# Patient Record
Sex: FEMALE | Race: WHITE | NOT HISPANIC OR LATINO | Employment: FULL TIME | URBAN - METROPOLITAN AREA
[De-identification: names, ages, dates, MRNs, and addresses within clinical notes are randomized per-mention and may not be internally consistent; named-entity substitution may affect disease eponyms.]

---

## 2017-01-23 ENCOUNTER — OFFICE VISIT (OUTPATIENT)
Dept: INTERNAL MEDICINE | Facility: CLINIC | Age: 42
End: 2017-01-23
Payer: COMMERCIAL

## 2017-01-23 VITALS
OXYGEN SATURATION: 100 % | SYSTOLIC BLOOD PRESSURE: 110 MMHG | TEMPERATURE: 98.2 F | HEART RATE: 78 BPM | BODY MASS INDEX: 25.45 KG/M2 | DIASTOLIC BLOOD PRESSURE: 82 MMHG | WEIGHT: 129.6 LBS | HEIGHT: 60 IN | RESPIRATION RATE: 16 BRPM

## 2017-01-23 DIAGNOSIS — R73.01 ELEVATED FASTING GLUCOSE: ICD-10-CM

## 2017-01-23 DIAGNOSIS — Z00.00 HEALTH CARE MAINTENANCE: Primary | ICD-10-CM

## 2017-01-23 DIAGNOSIS — Z13.6 CARDIOVASCULAR SCREENING; LDL GOAL LESS THAN 160: ICD-10-CM

## 2017-01-23 DIAGNOSIS — F41.9 ANXIETY: ICD-10-CM

## 2017-01-23 LAB
ALT SERPL W P-5'-P-CCNC: 42 U/L (ref 0–50)
ANION GAP SERPL CALCULATED.3IONS-SCNC: 10 MMOL/L (ref 3–14)
BUN SERPL-MCNC: 10 MG/DL (ref 7–30)
CALCIUM SERPL-MCNC: 8.7 MG/DL (ref 8.5–10.1)
CHLORIDE SERPL-SCNC: 102 MMOL/L (ref 94–109)
CHOLEST SERPL-MCNC: 212 MG/DL
CO2 SERPL-SCNC: 26 MMOL/L (ref 20–32)
CREAT SERPL-MCNC: 0.78 MG/DL (ref 0.52–1.04)
ERYTHROCYTE [DISTWIDTH] IN BLOOD BY AUTOMATED COUNT: 12 % (ref 10–15)
GFR SERPL CREATININE-BSD FRML MDRD: 81 ML/MIN/1.7M2
GLUCOSE SERPL-MCNC: 89 MG/DL (ref 70–99)
HBA1C MFR BLD: 4.8 % (ref 4.3–6)
HCT VFR BLD AUTO: 40.5 % (ref 35–47)
HDLC SERPL-MCNC: 106 MG/DL
HGB BLD-MCNC: 13.6 G/DL (ref 11.7–15.7)
LDLC SERPL CALC-MCNC: 89 MG/DL
MCH RBC QN AUTO: 32.9 PG (ref 26.5–33)
MCHC RBC AUTO-ENTMCNC: 33.6 G/DL (ref 31.5–36.5)
MCV RBC AUTO: 98 FL (ref 78–100)
NONHDLC SERPL-MCNC: 106 MG/DL
PLATELET # BLD AUTO: 256 10E9/L (ref 150–450)
POTASSIUM SERPL-SCNC: 4 MMOL/L (ref 3.4–5.3)
RBC # BLD AUTO: 4.13 10E12/L (ref 3.8–5.2)
SODIUM SERPL-SCNC: 138 MMOL/L (ref 133–144)
TRIGL SERPL-MCNC: 84 MG/DL
TSH SERPL DL<=0.005 MIU/L-ACNC: 2.94 MU/L (ref 0.4–4)
WBC # BLD AUTO: 5.4 10E9/L (ref 4–11)

## 2017-01-23 PROCEDURE — 99396 PREV VISIT EST AGE 40-64: CPT | Performed by: NURSE PRACTITIONER

## 2017-01-23 PROCEDURE — 36415 COLL VENOUS BLD VENIPUNCTURE: CPT | Performed by: NURSE PRACTITIONER

## 2017-01-23 PROCEDURE — 84443 ASSAY THYROID STIM HORMONE: CPT | Performed by: NURSE PRACTITIONER

## 2017-01-23 PROCEDURE — 80061 LIPID PANEL: CPT | Performed by: NURSE PRACTITIONER

## 2017-01-23 PROCEDURE — 80048 BASIC METABOLIC PNL TOTAL CA: CPT | Performed by: NURSE PRACTITIONER

## 2017-01-23 PROCEDURE — 83036 HEMOGLOBIN GLYCOSYLATED A1C: CPT | Performed by: NURSE PRACTITIONER

## 2017-01-23 PROCEDURE — 84460 ALANINE AMINO (ALT) (SGPT): CPT | Performed by: NURSE PRACTITIONER

## 2017-01-23 PROCEDURE — 86780 TREPONEMA PALLIDUM: CPT | Performed by: NURSE PRACTITIONER

## 2017-01-23 PROCEDURE — 85027 COMPLETE CBC AUTOMATED: CPT | Performed by: NURSE PRACTITIONER

## 2017-01-23 NOTE — PATIENT INSTRUCTIONS
Preventive Health Recommendations  Female Ages 40 to 49    Yearly exam:     See your health care provider every year in order to  1. Review health changes.   2. Discuss preventive care.    3. Review your medicines if your doctor prescribed any.      Get a Pap test every three years (unless you have an abnormal result and your provider advises testing more often).      If you get Pap tests with HPV test, you only need to test every 5 years, unless you have an abnormal result. You do not need a Pap test if your uterus was removed (hysterectomy) and you have not had cancer.      You should be tested each year for STDs (sexually transmitted diseases), if you're at risk.       Ask your doctor if you should have a mammogram.      Have a colonoscopy (test for colon cancer) if someone in your family has had colon cancer or polyps before age 50.       Have a cholesterol test every 5 years.       Have a diabetes test (fasting glucose) after age 45. If you are at risk for diabetes, you should have this test every 3 years.    Shots: Get a flu shot each year. Get a tetanus shot every 10 years.     Nutrition:     Eat at least 5 servings of fruits and vegetables each day.    Eat whole-grain bread, whole-wheat pasta and brown rice instead of white grains and rice.    Talk to your provider about Calcium and Vitamin D.     Lifestyle    Exercise at least 150 minutes a week (an average of 30 minutes a day, 5 days a week). This will help you control your weight and prevent disease.    Limit alcohol to one drink per day.    No smoking.     Wear sunscreen to prevent skin cancer.    See your dentist every six months for an exam and cleaning.    Alysia Mast CNP

## 2017-01-23 NOTE — PROGRESS NOTES
"   SUBJECTIVE:     CC: Sloane Aguilar is an 41 year old woman who presents for preventive health visit.     Healthy Habits:    Do you get at least three servings of calcium containing foods daily (dairy, green leafy vegetables, etc.)? yes and no, taking calcium and/or vitamin D supplement: no    Amount of exercise or daily activities, outside of work: 2 day(s) per week    Problems taking medications regularly No    Medication side effects: No    Have you had an eye exam in the past two years? yes    Do you see a dentist twice per year? yes    Do you have sleep apnea, excessive snoring or daytime drowsiness?no        Recent STD testing negative except unknown \"abnormality\" of syphilis titer    Today's PHQ-2 Score:   PHQ-2 ( 1999 Pfizer) 11/18/2015 8/18/2015   Q1: Little interest or pleasure in doing things 0 0   Q2: Feeling down, depressed or hopeless 0 0   PHQ-2 Score 0 0       Abuse: Current or Past(Physical, Sexual or Emotional)- No  Do you feel safe in your environment - Yes    Social History   Substance Use Topics     Smoking status: Never Smoker      Smokeless tobacco: Never Used     Alcohol Use: Yes      Comment: Occ     The patient does not drink >3 drinks per day nor >7 drinks per week.    Recent Labs   Lab Test  11/18/15   1008  11/18/15   1007  10/18/13   0742   CHOL  Canceled, Test credited   Charge credited    223*  219*   HDL  Canceled, Test credited   Charge credited    99  127*   LDL   --   106*  88   TRIG   --   88  83   CHOLHDLRATIO   --    --   1.9   NHDL  Canceled, Test credited   Charge credited    124   --        Reviewed orders with patient.  Reviewed health maintenance and updated orders accordingly - Yes    Mammo Decision Support:  Mammogram not appropriate for this patient based on age.    Pertinent mammograms are reviewed under the imaging tab.  History of abnormal Pap smear: NO - age 30- 65 PAP every 3 years recommended  All Histories reviewed and updated in Epic.  Past Medical History "   Diagnosis Date     anxiety         ROS:  C: NEGATIVE for fever, chills, change in weight  E: NEGATIVE for vision changes or irritation  ENT: NEGATIVE for ear, mouth and throat problems  R: NEGATIVE for significant cough or SOB  CV: NEGATIVE for chest pain, palpitations or peripheral edema  GI: NEGATIVE for nausea, abdominal pain, heartburn, or change in bowel habits   female: no unusual vaginal symptoms  M: NEGATIVE for significant arthralgias or myalgia  N: NEGATIVE for weakness, dizziness or paresthesias  P: NEGATIVE for changes in mood or affect    BP Readings from Last 3 Encounters:   01/23/17 110/82   02/07/16 126/82   12/11/15 108/71    Wt Readings from Last 3 Encounters:   01/23/17 129 lb 9.6 oz (58.786 kg)   02/07/16 131 lb (59.421 kg)   12/11/15 131 lb (59.421 kg)                  Patient Active Problem List   Diagnosis     Anxiety     CARDIOVASCULAR SCREENING; LDL GOAL LESS THAN 160     Cocaine dependence in remission (H)     Elevated fasting glucose     Past Surgical History   Procedure Laterality Date     Leep tx, cervical  2000     LEEP TX Cervical     Tubal ligation  2007     ablation     Hernia repair, umbilical  2007     Hc tooth extraction w/forcep       C appendectomy         Social History   Substance Use Topics     Smoking status: Never Smoker      Smokeless tobacco: Never Used     Alcohol Use: 0.0 oz/week     0 Standard drinks or equivalent per week      Comment: 2 wine several x weekly     Family History   Problem Relation Age of Onset     Hypertension Mother      CANCER Mother      uterine dx 2012     HEART DISEASE Mother      HEART DISEASE Father      HEART DISEASE Brother 48     stint         Current Outpatient Prescriptions   Medication Sig Dispense Refill     aluminum chloride (DRYSOL) 20 % external solution Use daily as directed 35 mL prn     escitalopram (LEXAPRO) 20 MG tablet Take 1 tablet (20 mg) by mouth daily 90 tablet 1     valACYclovir (VALTREX) 500 MG tablet Take 1 tablet (500  "mg) by mouth daily 1 TABLET DAILY 90 tablet 3     clotrimazole-betamethasone (LOTRISONE) cream Apply topically 2 times daily 30 g 1     traZODone (DESYREL) 50 MG tablet Take 1-2 tablets ( mg) by mouth nightly as needed for sleep 180 tablet 1     OBJECTIVE:     /82 mmHg  Pulse 78  Temp(Src) 98.2  F (36.8  C) (Oral)  Resp 16  Ht 5' 0.25\" (1.53 m)  Wt 129 lb 9.6 oz (58.786 kg)  BMI 25.11 kg/m2  SpO2 100%  EXAM:  GENERAL: healthy, alert and no distress  NECK: no adenopathy, no asymmetry, masses, or scars and thyroid normal to palpation  RESP: lungs clear to auscultation - no rales, rhonchi or wheezes  CV: regular rate and rhythm, normal S1 S2, no S3 or S4, no murmur, click or rub, no peripheral edema and peripheral pulses strong  ABDOMEN: soft, nontender, no hepatosplenomegaly, no masses and bowel sounds normal  MS: no gross musculoskeletal defects noted, no edema  PSYCH: mentation appears normal, affect normal/bright    ASSESSMENT/PLAN:         ICD-10-CM    1. Health care maintenance Z00.00 CBC with platelets     Basic metabolic panel     TSH with free T4 reflex     Anti Treponema   2. CARDIOVASCULAR SCREENING; LDL GOAL LESS THAN 160 Z13.6 ALT     Lipid Profile   3. Elevated fasting glucose R73.01 Hemoglobin A1c   4. Anxiety F41.9        COUNSELING:   Reviewed preventive health counseling, as reflected in patient instructions         reports that she has never smoked. She has never used smokeless tobacco.    Estimated body mass index is 25.11 kg/(m^2) as calculated from the following:    Height as of this encounter: 5' 0.25\" (1.53 m).    Weight as of this encounter: 129 lb 9.6 oz (58.786 kg).       Counseling Resources:  ATP IV Guidelines  Pooled Cohorts Equation Calculator  Breast Cancer Risk Calculator  FRAX Risk Assessment  ICSI Preventive Guidelines  Dietary Guidelines for Americans, 2010  USDA's MyPlate  ASA Prophylaxis  Lung CA Screening    Alysia Mast NP  [facility]  "

## 2017-01-23 NOTE — MR AVS SNAPSHOT
After Visit Summary   1/23/2017    Sloane Aguilar    MRN: [id number]           Patient Information     Date Of Birth          1975        Visit Information        Provider Department      1/23/2017 9:00 AM Alysia Mast NP [facility]        Today's [facility] care maintenance    -  1     CARDIOVASCULAR SCREENING; LDL GOAL LESS THAN 160         Elevated fasting glucose         Anxiety           Care Instructions      Preventive Health Recommendations  Female Ages 40 to 49    Yearly exam:     See your health care provider every year in order to  1. Review health changes.   2. Discuss preventive care.    3. Review your medicines if your doctor prescribed any.      Get a Pap test every three years (unless you have an abnormal result and your provider advises testing more often).      If you get Pap tests with HPV test, you only need to test every 5 years, unless you have an abnormal result. You do not need a Pap test if your uterus was removed (hysterectomy) and you have not had cancer.      You should be tested each year for STDs (sexually transmitted diseases), if you're at risk.       Ask your doctor if you should have a mammogram.      Have a colonoscopy (test for colon cancer) if someone in your family has had colon cancer or polyps before age 50.       Have a cholesterol test every 5 years.       Have a diabetes test (fasting glucose) after age 45. If you are at risk for diabetes, you should have this test every 3 years.    Shots: Get a flu shot each year. Get a tetanus shot every 10 years.     Nutrition:     Eat at least 5 servings of fruits and vegetables each day.    Eat whole-grain bread, whole-wheat pasta and brown rice instead of white grains and rice.    Talk to your provider about Calcium and Vitamin D.     Lifestyle    Exercise at least 150 minutes a week (an average of 30 minutes a day, 5 days a week). This will help you control your weight and  "prevent disease.    Limit alcohol to one drink per day.    No smoking.     Wear sunscreen to prevent skin cancer.    See your dentist every six months for an exam and cleaning.    Alysia Mast CNP          Follow-ups after your visit        Who to contact     If you have questions or need follow up information about today's clinic visit or your schedule please contact [facility] directly at [phone].  Normal or non-critical lab and imaging results will be communicated to you by Owlrhart, letter or phone within 4 business days after the clinic has received the results. If you do not hear from us within 7 days, please contact the clinic through [employer] or phone. If you have a critical or abnormal lab result, we will notify you by phone as soon as possible.  Submit refill requests through PayOrPass or call your pharmacy and they will forward the refill request to us. Please allow 3 business days for your refill to be completed.          Additional Information About Your Visit        [email or web address] Information     PayOrPass gives you secure access to your electronic health record. If you see a primary care provider, you can also send messages to your care team and make appointments. If you have questions, please call your primary care clinic.  If you do not have a primary care provider, please call [phone] and they will assist you.        Care EveryWhere ID     This is your Care EveryWhere ID. This could be used by other organizations to access your [facility] medical records  [id number]        Your Vitals Were     Pulse Temperature Respirations Height BMI (Body Mass Index) Pulse Oximetry    78 98.2  F (36.8  C) (Oral) 16 5' 0.25\" (1.53 m) 25.11 kg/m2 100%       Blood Pressure from Last 3 Encounters:   01/23/17 110/82   02/07/16 126/82   12/11/15 108/71    Weight from Last 3 Encounters:   01/23/17 129 lb 9.6 oz (58.786 kg)   02/07/16 131 lb (59.421 kg)   12/11/15 131 lb (59.421 kg)              We " Performed the Following     ALT     Anti Treponema     Basic metabolic panel     CBC with platelets     Hemoglobin A1c     Lipid Profile     TSH with free T4 reflex        Primary Care Provider Office Phone # Fax #    Clevemarlene YAEL Stanford [phone]       [facility] [address] [facility] [address]        Thank you!     Thank you for choosing [facility]  for your care. Our goal is always to provide you with excellent care. Hearing back from our patients is one way we can continue to improve our services. Please take a few minutes to complete the written survey that you may receive in the mail after your visit with us. Thank you!             Your Updated Medication List - Protect others around you: Learn how to safely use, store and throw away your medicines at www.disposemymeds.org.          This list is accurate as of: 1/23/17  9:24 AM.  Always use your most recent med list.                   Brand Name Dispense Instructions for use    aluminum chloride 20 % external solution    DRYSOL    35 mL    Use daily as directed       clotrimazole-betamethasone cream    LOTRISONE    30 g    Apply topically 2 times daily       escitalopram 20 MG tablet    LEXAPRO    90 tablet    Take 1 tablet (20 mg) by mouth daily       traZODone 50 MG tablet    DESYREL    180 tablet    Take 1-2 tablets ( mg) by mouth nightly as needed for sleep       valACYclovir 500 MG tablet    VALTREX    90 tablet    Take 1 tablet (500 mg) by mouth daily 1 TABLET DAILY

## 2017-01-23 NOTE — NURSING NOTE
"Chief Complaint   Patient presents with     Physical     fasting,no pap needed.       Initial /82 mmHg  Pulse 78  Temp(Src) 98.2  F (36.8  C) (Oral)  Resp 16  Ht 5' 0.25\" (1.53 m)  Wt 129 lb 9.6 oz (58.786 kg)  BMI 25.11 kg/m2  SpO2 100% Estimated body mass index is 25.11 kg/(m^2) as calculated from the following:    Height as of this encounter: 5' 0.25\" (1.53 m).    Weight as of this encounter: 129 lb 9.6 oz (58.786 kg).  BP completed using cuff size: regular    "

## 2017-01-24 LAB — T PALLIDUM IGG+IGM SER QL: NEGATIVE

## 2017-03-23 DIAGNOSIS — B00.9 HSV INFECTION: ICD-10-CM

## 2017-03-23 DIAGNOSIS — F43.23 ADJUSTMENT DISORDER WITH MIXED ANXIETY AND DEPRESSED MOOD: ICD-10-CM

## 2017-03-23 NOTE — TELEPHONE ENCOUNTER
Pt has not been seen in Ob-Gyn since 8/2015. Attempted to contact pt to schedule an appt. Left voice message to call back.     *2 refill encounters from 3/23/17 for pt.

## 2017-03-23 NOTE — TELEPHONE ENCOUNTER
escitalopram     Last Written Prescription Date: 9/30/15  Last Fill Quantity: 90, # refills: 1  Last Office Visit with [facility] [place] primary care provider:  8/18/15        Last PHQ-9 score on record=   PHQ-9 SCORE 3/2/2010   Total Score 12

## 2017-03-23 NOTE — TELEPHONE ENCOUNTER
valacyclovir     Last Written Prescription Date: 9/23/15  Last Fill Quantity: 90, # refills: 3  Last Office Visit with [facility] or [facility] prescribing provider: 8/8/15        Creatinine   Date Value Ref Range Status   01/23/2017 0.78 0.52 - 1.04 mg/dL Final

## 2017-03-24 RX ORDER — ESCITALOPRAM OXALATE 20 MG/1
20 TABLET ORAL DAILY
Qty: 90 TABLET | Refills: 0 | Status: SHIPPED | OUTPATIENT
Start: 2017-03-24 | End: 2017-03-27

## 2017-03-24 RX ORDER — VALACYCLOVIR HYDROCHLORIDE 500 MG/1
500 TABLET, FILM COATED ORAL DAILY
Qty: 90 TABLET | Refills: 0 | Status: SHIPPED | OUTPATIENT
Start: 2017-03-24 | End: 2017-07-31

## 2017-03-24 NOTE — TELEPHONE ENCOUNTER
Alysia,  I saw this med was also requested.  Just wanted to send as well in case you are willing to also fill this one.  Shanae Llanes R.N.

## 2017-03-24 NOTE — TELEPHONE ENCOUNTER
Alysia,    You saw this pt for PE on 01/23/17.  She had gotten her escitalopram ordered by Dr Ovalles in the past.  But are you willing to tomy refills for this?  Shanae Llanes R.N.

## 2017-03-27 RX ORDER — ESCITALOPRAM OXALATE 20 MG/1
20 TABLET ORAL DAILY
Qty: 30 TABLET | Refills: 0 | Status: SHIPPED | OUTPATIENT
Start: 2017-03-27 | End: 2018-05-09

## 2017-04-15 ENCOUNTER — RADIANT APPOINTMENT (OUTPATIENT)
Dept: MAMMOGRAPHY | Facility: CLINIC | Age: 42
End: 2017-04-15
Payer: COMMERCIAL

## 2017-04-15 DIAGNOSIS — Z12.31 VISIT FOR SCREENING MAMMOGRAM: ICD-10-CM

## 2017-04-15 PROCEDURE — G0202 SCR MAMMO BI INCL CAD: HCPCS | Mod: TC

## 2017-07-31 DIAGNOSIS — B00.9 HSV INFECTION: ICD-10-CM

## 2017-08-01 RX ORDER — VALACYCLOVIR HYDROCHLORIDE 500 MG/1
TABLET, FILM COATED ORAL
Qty: 90 TABLET | Refills: 0 | Status: SHIPPED | OUTPATIENT
Start: 2017-08-01 | End: 2018-01-28

## 2017-08-01 NOTE — TELEPHONE ENCOUNTER
Valacyclovir     Last Written Prescription Date: 03/24/17  Last Fill Quantity: 90, # refills: 0  Last Office Visit with [facility] prescribing provider: 01/23/17 Mast        Creatinine   Date Value Ref Range Status   01/23/2017 0.78 0.52 - 1.04 mg/dL Final

## 2017-08-31 DIAGNOSIS — F43.23 ADJUSTMENT DISORDER WITH MIXED ANXIETY AND DEPRESSED MOOD: ICD-10-CM

## 2017-08-31 NOTE — TELEPHONE ENCOUNTER
Escitalopram     Last Written Prescription Date: 03/27/17  Last Fill Quantity: 30, # refills: 0  Last Office Visit with [facility] primary care provider:  01/23/17        Last PHQ-9 score on record=   PHQ-9 SCORE 3/2/2010   Total Score 12

## 2017-09-01 RX ORDER — ESCITALOPRAM OXALATE 20 MG/1
TABLET ORAL
Qty: 30 TABLET | Refills: 0 | OUTPATIENT
Start: 2017-09-01

## 2017-12-15 DIAGNOSIS — F43.23 ADJUSTMENT DISORDER WITH MIXED ANXIETY AND DEPRESSED MOOD: ICD-10-CM

## 2017-12-18 RX ORDER — ESCITALOPRAM OXALATE 20 MG/1
TABLET ORAL
Qty: 30 TABLET | Refills: 0 | OUTPATIENT
Start: 2017-12-18

## 2017-12-18 NOTE — TELEPHONE ENCOUNTER
Lexapro  Last Written Prescription Date:  3/27/17  Last Fill Quantity: 30,  # refills: 0   Last Office Visit with [facility] or [facility] prescribing provider:  1/23/17   Future Office Visit:     Routing refill request to provider for review/approval because:  A break in medication

## 2017-12-22 NOTE — TELEPHONE ENCOUNTER
Pt calls, asking why rx was refused. Discuss last rx was for 1 month supply in March so there's been a gap in administration. Offer appt to discuss. Okay with this, but wondering if appt closer to where she lives in [place] would be available. Transferred to scheduling to make appt.

## 2017-12-26 ENCOUNTER — OFFICE VISIT (OUTPATIENT)
Dept: FAMILY MEDICINE | Facility: CLINIC | Age: 42
End: 2017-12-26
Payer: COMMERCIAL

## 2017-12-26 VITALS
TEMPERATURE: 97.9 F | BODY MASS INDEX: 26.31 KG/M2 | SYSTOLIC BLOOD PRESSURE: 110 MMHG | WEIGHT: 134 LBS | DIASTOLIC BLOOD PRESSURE: 82 MMHG | RESPIRATION RATE: 16 BRPM | HEIGHT: 60 IN | HEART RATE: 72 BPM

## 2017-12-26 DIAGNOSIS — Z23 NEED FOR PROPHYLACTIC VACCINATION AND INOCULATION AGAINST INFLUENZA: ICD-10-CM

## 2017-12-26 DIAGNOSIS — F43.23 ADJUSTMENT DISORDER WITH MIXED ANXIETY AND DEPRESSED MOOD: Primary | ICD-10-CM

## 2017-12-26 PROCEDURE — 90471 IMMUNIZATION ADMIN: CPT | Performed by: NURSE PRACTITIONER

## 2017-12-26 PROCEDURE — 99213 OFFICE O/P EST LOW 20 MIN: CPT | Mod: 25 | Performed by: NURSE PRACTITIONER

## 2017-12-26 PROCEDURE — 90686 IIV4 VACC NO PRSV 0.5 ML IM: CPT | Performed by: NURSE PRACTITIONER

## 2017-12-26 RX ORDER — ESCITALOPRAM OXALATE 20 MG/1
20 TABLET ORAL DAILY
Qty: 90 TABLET | Refills: 1 | Status: SHIPPED | OUTPATIENT
Start: 2017-12-26 | End: 2018-05-09

## 2017-12-26 ASSESSMENT — ANXIETY QUESTIONNAIRES
6. BECOMING EASILY ANNOYED OR IRRITABLE: NOT AT ALL
7. FEELING AFRAID AS IF SOMETHING AWFUL MIGHT HAPPEN: NOT AT ALL
5. BEING SO RESTLESS THAT IT IS HARD TO SIT STILL: NOT AT ALL
2. NOT BEING ABLE TO STOP OR CONTROL WORRYING: NOT AT ALL
GAD7 TOTAL SCORE: 0
1. FEELING NERVOUS, ANXIOUS, OR ON EDGE: NOT AT ALL
3. WORRYING TOO MUCH ABOUT DIFFERENT THINGS: NOT AT ALL

## 2017-12-26 ASSESSMENT — PATIENT HEALTH QUESTIONNAIRE - PHQ9
SUM OF ALL RESPONSES TO PHQ QUESTIONS 1-9: 0
5. POOR APPETITE OR OVEREATING: NOT AT ALL

## 2017-12-26 ASSESSMENT — ENCOUNTER SYMPTOMS: NERVOUS/ANXIOUS: 1

## 2017-12-26 NOTE — MR AVS SNAPSHOT
After Visit Summary   12/26/2017    Sloane Aguilar    MRN: [id number]           Patient Information     Date Of Birth          1975        Visit Information        Provider Department      12/26/2017 5:20 PM Jemma Calixto APRN CNP [facility]        Today's Diagnoses     Need for prophylactic vaccination and inoculation against influenza    -  1    Adjustment disorder with mixed anxiety and depressed mood           Follow-ups after your visit        Follow-up notes from your care team     Return in about 6 months (around 6/26/2018) for mood .      Who to contact     If you have questions or need follow up information about today's clinic visit or your schedule please contact [facility] directly at [phone].  Normal or non-critical lab and imaging results will be communicated to you by [employer] - 3Phart, letter or phone within 4 business days after the clinic has received the results. If you do not hear from us within 7 days, please contact the clinic through [employer] Pauer [employer] 3Phart or phone. If you have a critical or abnormal lab result, we will notify you by phone as soon as possible.  Submit refill requests through [email or web address] or call your pharmacy and they will forward the refill request to us. Please allow 3 business days for your refill to be completed.          Additional Information About Your Visit        MyChart Information     [email or web address] gives you secure access to your electronic health record. If you see a primary care provider, you can also send messages to your care team and make appointments. If you have questions, please call your primary care clinic.  If you do not have a primary care provider, please call [phone] and they will assist you.        Care EveryWhere ID     This is your Care EveryWhere ID. This could be used by other organizations to access your [facility] medical records  [id number]        Your Vitals Were     Pulse Temperature Respirations Height  "BMI (Body Mass Index)       72 97.9  F (36.6  C) (Oral) 16 5' 0.25\" (1.53 m) 25.95 kg/m2        Blood Pressure from Last 3 Encounters:   12/26/17 110/82   01/23/17 110/82   02/07/16 126/82    Weight from Last 3 Encounters:   12/26/17 134 lb (60.8 kg)   01/23/17 129 lb 9.6 oz (58.8 kg)   02/07/16 131 lb (59.4 kg)              We Performed the Following     FLU VAC, SPLIT VIRUS IM > 3 YO (QUADRIVALENT) [15349]     Vaccine Administration, Initial [45160]          Today's Medication Changes          These changes are accurate as of: 12/26/17  6:00 PM.  If you have any questions, ask your nurse or doctor.               These medicines have changed or have updated prescriptions.        Dose/Directions    * escitalopram 20 MG tablet   Commonly known as:  LEXAPRO   This may have changed:  Another medication with the same name was added. Make sure you understand how and when to take each.   Used for:  Adjustment disorder with mixed anxiety and depressed mood   Changed by:  Hany Ovalles MD        Dose:  20 mg   Take 1 tablet (20 mg) by mouth daily   Quantity:  30 tablet   Refills:  0       * escitalopram 20 MG tablet   Commonly known as:  LEXAPRO   This may have changed:  You were already taking a medication with the same name, and this prescription was added. Make sure you understand how and when to take each.   Used for:  Adjustment disorder with mixed anxiety and depressed mood   Changed by:  Jemma Calixto APRN CNP        Dose:  20 mg   Take 1 tablet (20 mg) by mouth daily   Quantity:  90 tablet   Refills:  1       * Notice:  This list has 2 medication(s) that are the same as other medications prescribed for you. Read the directions carefully, and ask your doctor or other care provider to review them with you.         Where to get your medicines      These medications were [id number] [place] [facility] [address]  [address], [facility] [address]     Phone:  [phone]     " escitalopram 20 MG tablet                Primary Care Provider Office Phone # Fax #    Krishnakumari YAEL MD Abdoulaye [phone]       [address] [facility] [address]        Equal Access to Services     TABITHA AGARWAL : Hadii jason ku fero Sorobbieali, waaxda luqadaha, qaybta kaalmada aderodolfoyada, norah barrn jenna awad laRodokarissa lee. So [facility] [phone].    ATENCIÓN: Si habla español, tiene a pacheco disposición servicios gratuitos de asistencia lingüística. Llame al [phone].    We comply with applicable federal civil rights laws and [place] laws. We do not discriminate on the basis of race, color, national origin, age, disability, sex, sexual orientation, or gender identity.            Thank you!     Thank you for choosing [facility]  for your care. Our goal is always to provide you with excellent care. Hearing back from our patients is one way we can continue to improve our services. Please take a few minutes to complete the written survey that you may receive in the mail after your visit with us. Thank you!             Your Updated Medication List - Protect others around you: Learn how to safely use, store and throw away your medicines at www.disposemymeds.org.          This list is accurate as of: 12/26/17  6:00 PM.  Always use your most recent med list.                   Brand Name Dispense Instructions for use Diagnosis    aluminum chloride 20 % external solution    DRYSOL    35 mL    Use daily as directed    Perspiration excessive       clotrimazole-betamethasone cream    LOTRISONE    30 g    Apply topically 2 times daily    Contact dermatitis       * escitalopram 20 MG tablet    LEXAPRO    30 tablet    Take 1 tablet (20 mg) by mouth daily    Adjustment disorder with mixed anxiety and depressed mood       * escitalopram 20 MG tablet    LEXAPRO    90 tablet    Take 1 tablet (20 mg) by mouth daily    Adjustment disorder with mixed anxiety and depressed mood        traZODone 50 MG tablet    DESYREL    180 tablet    Take 1-2 tablets ( mg) by mouth nightly as needed for sleep    Insomnia       valACYclovir 500 MG tablet    VALTREX    90 tablet    TAKE 1 TABLET (500 MG) BY MOUTH DAILY    HSV infection       * Notice:  This list has 2 medication(s) that are the same as other medications prescribed for you. Read the directions carefully, and ask your doctor or other care provider to review them with you.

## 2017-12-26 NOTE — PROGRESS NOTES
SUBJECTIVE:                                                    Sloane Aguilar is a 42 year old female who presents to clinic today for the following health issues:      Depression     Depression & Anxiety Follow-up:     Depression/Anxiety:  Anxiety only    Status since last visit::  Stable    Other associated symptoms of anxiety::  None    Significant life event::  No    Current substance use::  None    Depression symptoms::  None  Anxiety     History of Present Illness     Depression & Anxiety Follow-up:     Depression/Anxiety:  Anxiety only    Status since last visit::  Stable    Other associated symptoms of anxiety::  None    Significant life event::  No    Current substance use::  None    Depression symptoms::  None    Taking Lexapro, for the most part, daily.  Will miss a dose here and there and will sometimes go off the medication in the summer.  She reports it is working well for her and denies side effects.  No current substance abuse or SI.  Occasional alcohol use.  PHQ-9 SCORE 8/3/2009 3/2/2010 12/26/2017   Total Score 7 12 -   Total Score - - 0     TAL-7 SCORE 4/12/2012 7/29/2014 12/26/2017   Total Score 13 0 -   Total Score - - 0         Problem list and histories reviewed & adjusted, as indicated.  Additional history: as documented      Current Outpatient Prescriptions   Medication Sig Dispense Refill     escitalopram (LEXAPRO) 20 MG tablet Take 1 tablet (20 mg) by mouth daily 90 tablet 1     valACYclovir (VALTREX) 500 MG tablet TAKE 1 TABLET (500 MG) BY MOUTH DAILY 90 tablet 0     escitalopram (LEXAPRO) 20 MG tablet Take 1 tablet (20 mg) by mouth daily 30 tablet 0     aluminum chloride (DRYSOL) 20 % external solution Use daily as directed 35 mL prn     clotrimazole-betamethasone (LOTRISONE) cream Apply topically 2 times daily 30 g 1     traZODone (DESYREL) 50 MG tablet Take 1-2 tablets ( mg) by mouth nightly as needed for sleep 180 tablet 1     Allergies   Allergen Reactions     Formaldehyde  "Rash       ROS:  Constitutional, HEENT, cardiovascular, pulmonary, gi and gu systems are negative, except as otherwise noted.      OBJECTIVE:   /82 (BP Location: Right arm, Patient Position: Sitting, Cuff Size: Adult Regular)  Pulse 72  Temp 97.9  F (36.6  C) (Oral)  Resp 16  Ht 5' 0.25\" (1.53 m)  Wt 134 lb (60.8 kg)  BMI 25.95 kg/m2  Body mass index is 25.95 kg/(m^2).  GENERAL: healthy, alert and no distress  NECK: no adenopathy, no asymmetry, masses, or scars and thyroid normal to palpation  RESP: lungs clear to auscultation - no rales, rhonchi or wheezes  CV: regular rate and rhythm, normal S1 S2, no S3 or S4, no murmur, click or rub, no peripheral edema and peripheral pulses strong  NEURO: AOx4  PSYCH: mentation appears normal, affect normal/bright    Diagnostic Test Results:  none     ASSESSMENT/PLAN:   1. Need for prophylactic vaccination and inoculation against influenza  Administered today.   - FLU VAC, SPLIT VIRUS IM > 3 YO (QUADRIVALENT) [99333]  - Vaccine Administration, Initial [15139]    2. anxiety  Updated GAD7.  Well controlled on lexapro.  Refilled; f/u in 6 mos.    - escitalopram (LEXAPRO) 20 MG tablet; Take 1 tablet (20 mg) by mouth daily  Dispense: 90 tablet; Refill: 1        JONAS Patel [facility]  Injectable Influenza Immunization Documentation    1.  Is the person to be vaccinated sick today?   No    2. Does the person to be vaccinated have an allergy to a component   of the vaccine?   No  Egg Allergy Algorithm Link    3. Has the person to be vaccinated ever had a serious reaction   to influenza vaccine in the past?   No    4. Has the person to be vaccinated ever had Guillain-Barré syndrome?   No    Form completed by Sloane"

## 2017-12-27 ASSESSMENT — ANXIETY QUESTIONNAIRES: GAD7 TOTAL SCORE: 0

## 2018-01-28 DIAGNOSIS — B00.9 HSV INFECTION: ICD-10-CM

## 2018-01-28 NOTE — LETTER
[facility]  [address], [address]  [place], [place]  [address]                                            TEL:[phone]  FAX:[phone]      Sloane Aguilar  [address]  [facility] [address]      February 2, 2018    Dear Sloane       We have received a refill request from your pharmacy, however, we were only able to provide a one time fill because you are due for an appointment and labs. Please call [phone] to schedule an appointment before you are due for your next refill.     Sincerely,  Mami, RN - Triage Nurse

## 2018-02-02 RX ORDER — VALACYCLOVIR HYDROCHLORIDE 500 MG/1
TABLET, FILM COATED ORAL
Qty: 30 TABLET | Refills: 0 | Status: SHIPPED | OUTPATIENT
Start: 2018-02-02 | End: 2018-04-05

## 2018-02-02 NOTE — TELEPHONE ENCOUNTER
"Requested Prescriptions   Pending Prescriptions Disp Refills     valACYclovir (VALTREX) 500 MG tablet [Pharmacy Med Name: VALACYCLOVIR  MG TABLET] 90 tablet 0     Sig: TAKE 1 TABLET (500 MG) BY MOUTH DAILY    Antivirals for Herpes Protocol Failed    1/28/2018 12:28 PM       Failed - Recent or future visit with authorizing provider's specialty    Patient had office visit in the last year or has a visit in the next 30 days with authorizing provider.  See \"Patient Info\" tab in inbasket, or \"Choose Columns\" in Meds & Orders section of the refill encounter.   Last OV: 01/23/17        Failed - Normal serum creatinine on file in past 12 months    Recent Labs   Lab Test  01/23/17   0929   CR  0.78          Passed - Patient is age 12 or older        Medication is being filled for 1 time refill only due to:  due for appt and labs  Mailed letter.      "

## 2018-03-09 ENCOUNTER — TRANSFERRED RECORDS (OUTPATIENT)
Dept: HEALTH INFORMATION MANAGEMENT | Facility: CLINIC | Age: 43
End: 2018-03-09

## 2018-04-05 DIAGNOSIS — B00.9 HSV INFECTION: ICD-10-CM

## 2018-04-05 RX ORDER — VALACYCLOVIR HYDROCHLORIDE 500 MG/1
500 TABLET, FILM COATED ORAL DAILY
Qty: 30 TABLET | Refills: 1 | Status: SHIPPED | OUTPATIENT
Start: 2018-04-05 | End: 2018-07-06

## 2018-04-05 NOTE — TELEPHONE ENCOUNTER
Patient calling, needs refill of valtrex, was told by pharmacy that she needed to contact office.  She is overdue for annual exam.  Medication is being filled for 1 time refill only due to:  Patient needs to be seen because it has been more than one year since last visit.   She will call to schedule annual exam.  Shawanda Castro RN

## 2018-05-04 ENCOUNTER — TELEPHONE (OUTPATIENT)
Dept: FAMILY MEDICINE | Facility: CLINIC | Age: 43
End: 2018-05-04

## 2018-05-04 NOTE — TELEPHONE ENCOUNTER
Panel Management Review      Patient has the following on her problem list: None      Composite cancer screening  Chart review shows that this patient is due/due soon for the following Pap Smear  Summary:    Patient is due/failing the following:   PAP    Action needed:   Patient needs office visit for pap.    Type of outreach:    None. Patient scheduled for upcoming appointment.     Questions for provider review:    None                                                                                                                                    Cecy Cordova MA     Chart routed to Care Team .

## 2018-05-09 ENCOUNTER — OFFICE VISIT (OUTPATIENT)
Dept: FAMILY MEDICINE | Facility: CLINIC | Age: 43
End: 2018-05-09
Payer: COMMERCIAL

## 2018-05-09 VITALS
DIASTOLIC BLOOD PRESSURE: 74 MMHG | HEART RATE: 80 BPM | RESPIRATION RATE: 16 BRPM | BODY MASS INDEX: 25.57 KG/M2 | TEMPERATURE: 98.3 F | SYSTOLIC BLOOD PRESSURE: 102 MMHG | WEIGHT: 132 LBS

## 2018-05-09 DIAGNOSIS — B96.89 BV (BACTERIAL VAGINOSIS): ICD-10-CM

## 2018-05-09 DIAGNOSIS — F43.23 ADJUSTMENT DISORDER WITH MIXED ANXIETY AND DEPRESSED MOOD: ICD-10-CM

## 2018-05-09 DIAGNOSIS — Z00.00 ROUTINE GENERAL MEDICAL EXAMINATION AT A HEALTH CARE FACILITY: Primary | ICD-10-CM

## 2018-05-09 DIAGNOSIS — Z11.3 SCREEN FOR STD (SEXUALLY TRANSMITTED DISEASE): ICD-10-CM

## 2018-05-09 DIAGNOSIS — N76.0 BV (BACTERIAL VAGINOSIS): ICD-10-CM

## 2018-05-09 DIAGNOSIS — Z12.4 SCREENING FOR CERVICAL CANCER: ICD-10-CM

## 2018-05-09 LAB
ANION GAP SERPL CALCULATED.3IONS-SCNC: 8 MMOL/L (ref 3–14)
BUN SERPL-MCNC: 6 MG/DL (ref 7–30)
CALCIUM SERPL-MCNC: 8.6 MG/DL (ref 8.5–10.1)
CHLORIDE SERPL-SCNC: 103 MMOL/L (ref 94–109)
CHOLEST SERPL-MCNC: 221 MG/DL
CO2 SERPL-SCNC: 27 MMOL/L (ref 20–32)
CREAT SERPL-MCNC: 0.72 MG/DL (ref 0.52–1.04)
GFR SERPL CREATININE-BSD FRML MDRD: 88 ML/MIN/1.7M2
GLUCOSE SERPL-MCNC: 81 MG/DL (ref 70–99)
HDLC SERPL-MCNC: 103 MG/DL
LDLC SERPL CALC-MCNC: 101 MG/DL
NONHDLC SERPL-MCNC: 118 MG/DL
POTASSIUM SERPL-SCNC: 4.2 MMOL/L (ref 3.4–5.3)
SODIUM SERPL-SCNC: 138 MMOL/L (ref 133–144)
SPECIMEN SOURCE: ABNORMAL
TRIGL SERPL-MCNC: 85 MG/DL
WET PREP SPEC: ABNORMAL

## 2018-05-09 PROCEDURE — 87389 HIV-1 AG W/HIV-1&-2 AB AG IA: CPT | Performed by: NURSE PRACTITIONER

## 2018-05-09 PROCEDURE — 80048 BASIC METABOLIC PNL TOTAL CA: CPT | Performed by: NURSE PRACTITIONER

## 2018-05-09 PROCEDURE — 86803 HEPATITIS C AB TEST: CPT | Performed by: NURSE PRACTITIONER

## 2018-05-09 PROCEDURE — G0124 SCREEN C/V THIN LAYER BY MD: HCPCS | Performed by: NURSE PRACTITIONER

## 2018-05-09 PROCEDURE — 87210 SMEAR WET MOUNT SALINE/INK: CPT | Performed by: NURSE PRACTITIONER

## 2018-05-09 PROCEDURE — 80061 LIPID PANEL: CPT | Performed by: NURSE PRACTITIONER

## 2018-05-09 PROCEDURE — 87624 HPV HI-RISK TYP POOLED RSLT: CPT | Performed by: NURSE PRACTITIONER

## 2018-05-09 PROCEDURE — 99396 PREV VISIT EST AGE 40-64: CPT | Performed by: NURSE PRACTITIONER

## 2018-05-09 PROCEDURE — 36415 COLL VENOUS BLD VENIPUNCTURE: CPT | Performed by: NURSE PRACTITIONER

## 2018-05-09 PROCEDURE — G0145 SCR C/V CYTO,THINLAYER,RESCR: HCPCS | Performed by: NURSE PRACTITIONER

## 2018-05-09 PROCEDURE — 87591 N.GONORRHOEAE DNA AMP PROB: CPT | Performed by: NURSE PRACTITIONER

## 2018-05-09 PROCEDURE — 87491 CHLMYD TRACH DNA AMP PROBE: CPT | Performed by: NURSE PRACTITIONER

## 2018-05-09 RX ORDER — METRONIDAZOLE 500 MG/1
500 TABLET ORAL 2 TIMES DAILY
Qty: 14 TABLET | Refills: 0 | Status: SHIPPED | OUTPATIENT
Start: 2018-05-09 | End: 2018-05-16

## 2018-05-09 RX ORDER — ESCITALOPRAM OXALATE 20 MG/1
20 TABLET ORAL DAILY
Qty: 90 TABLET | Refills: 1 | Status: SHIPPED | OUTPATIENT
Start: 2018-05-09 | End: 2018-05-09

## 2018-05-09 RX ORDER — ESCITALOPRAM OXALATE 20 MG/1
20 TABLET ORAL DAILY
Qty: 90 TABLET | Refills: 3 | Status: SHIPPED | OUTPATIENT
Start: 2018-05-09 | End: 2019-07-24

## 2018-05-09 ASSESSMENT — ANXIETY QUESTIONNAIRES
7. FEELING AFRAID AS IF SOMETHING AWFUL MIGHT HAPPEN: NOT AT ALL
GAD7 TOTAL SCORE: 0
6. BECOMING EASILY ANNOYED OR IRRITABLE: NOT AT ALL
1. FEELING NERVOUS, ANXIOUS, OR ON EDGE: NOT AT ALL
2. NOT BEING ABLE TO STOP OR CONTROL WORRYING: NOT AT ALL
5. BEING SO RESTLESS THAT IT IS HARD TO SIT STILL: NOT AT ALL
3. WORRYING TOO MUCH ABOUT DIFFERENT THINGS: NOT AT ALL

## 2018-05-09 ASSESSMENT — PATIENT HEALTH QUESTIONNAIRE - PHQ9: 5. POOR APPETITE OR OVEREATING: NOT AT ALL

## 2018-05-09 NOTE — MR AVS SNAPSHOT
After Visit Summary   5/9/2018    Sloane Aguilar    MRN: [id number]           Patient Information     Date Of Birth          1975        Visit Information        Provider Department      5/9/2018 9:00 AM Jemma Calixto APRN [facility]        Today's Diagnoses     Routine general medical examination at a health care facility    -  1    Screening for cervical cancer        Screen for STD (sexually transmitted disease)        anxiety          Care Instructions      Preventive Health Recommendations  Female Ages 40 to 49    Yearly exam:     See your health care provider every year in order to  1. Review health changes.   2. Discuss preventive care.    3. Review your medicines if your doctor prescribed any.      Get a Pap test every three years (unless you have an abnormal result and your provider advises testing more often).      If you get Pap tests with HPV test, you only need to test every 5 years, unless you have an abnormal result. You do not need a Pap test if your uterus was removed (hysterectomy) and you have not had cancer.      You should be tested each year for STDs (sexually transmitted diseases), if you're at risk.       Ask your doctor if you should have a mammogram.      Have a colonoscopy (test for colon cancer) if someone in your family has had colon cancer or polyps before age 50.       Have a cholesterol test every 5 years.       Have a diabetes test (fasting glucose) after age 45. If you are at risk for diabetes, you should have this test every 3 years.    Shots: Get a flu shot each year. Get a tetanus shot every 10 years.     Nutrition:     Eat at least 5 servings of fruits and vegetables each day.    Eat whole-grain bread, whole-wheat pasta and brown rice instead of white grains and rice.    Talk to your provider about Calcium and Vitamin D.     Lifestyle    Exercise at least 150 minutes a week (an average of 30 minutes a day, 5 days a week). This  will help you control your weight and prevent disease.    Limit alcohol to one drink per day.    No smoking.     Wear sunscreen to prevent skin cancer.    See your dentist every six months for an exam and cleaning.          Follow-ups after your visit        Follow-up notes from your care team     Return in about 1 year (around 5/9/2019) for Physical Exam.      Who to contact     If you have questions or need follow up information about today's clinic visit or your schedule please contact [facility] directly at [phone].  Normal or non-critical lab and imaging results will be communicated to you by COLOURlovershart, letter or phone within 4 business days after the clinic has received the results. If you do not hear from us within 7 days, please contact the clinic through [employer]Te[employer] or phone. If you have a critical or abnormal lab result, we will notify you by phone as soon as possible.  Submit refill requests through [employer] or call your pharmacy and they will forward the refill request to us. Please allow 3 business days for your refill to be completed.          Additional Information About Your Visit        COLOURloversharSmallaa Information     [employer] gives you secure access to your electronic health record. If you see a primary care provider, you can also send messages to your care team and make appointments. If you have questions, please call your primary care clinic.  If you do not have a primary care provider, please call [phone] and they will assist you.        Care EveryWhere ID     This is your Care EveryWhere ID. This could be used by other organizations to access your [facility] medical records  [id number]        Your Vitals Were     Pulse Temperature Respirations BMI (Body Mass Index)          80 98.3  F (36.8  C) (Oral) 16 25.57 kg/m2         Blood Pressure from Last 3 Encounters:   05/09/18 102/74   12/26/17 110/82   01/23/17 110/82    Weight from Last 3 Encounters:   05/09/18 132 lb (59.9 kg)    12/26/17 134 lb (60.8 kg)   01/23/17 129 lb 9.6 oz (58.8 kg)              We Performed the Following     Basic metabolic panel     Chlamydia trachomatis PCR     Hepatitis C antibody     HIV Antigen Antibody Combo     HPV High Risk Types DNA Cervical     Lipid panel reflex to direct LDL Fasting     Neisseria gonorrhoeae PCR     Pap imaged thin layer screen with HPV - recommended age 30 - 65 years (select HPV order below)     Wet prep          Today's Medication Changes          These changes are accurate as of 5/9/18  9:58 AM.  If you have any questions, ask your nurse or doctor.               Start taking these medicines.        Dose/Directions    escitalopram 20 MG tablet   Commonly known as:  LEXAPRO   Used for:  Adjustment disorder with mixed anxiety and depressed mood   Started by:  Jemma Calixto APRN CNP        Dose:  20 mg   Take 1 tablet (20 mg) by mouth daily   Quantity:  90 tablet   Refills:  3            Where to get your medicines      These medications were sent to [facility] [address] [place], [place] [address] [address] [address] [facility] [address]  [address] [facility] [address]     Phone:  [phone]     escitalopram 20 MG tablet                Primary Care Provider Office Phone # Fax #    Krishnakumari YAEL Stanford [phone]       [address] [facility] [address]        Equal Access to Services     TABITHA AGARWAL : Hadii aad ku hadasho Soomaali, waaxda luqadaha, qaybta kaalmada adeegyada, waxaustin lee. So [facility] [phone].    ATENCIÓN: Si habla español, tiene a pacheco disposición servicios gratuitos de asistencia lingüística. Omari al [phone].    We comply with applicable federal civil rights laws and [place] laws. We do not discriminate on the basis of race, color, national origin, age, disability, sex, sexual orientation, or gender identity.            Thank you!     Thank you for choosing [facility]  [facility]  for your care. Our goal is always to provide you with excellent care. Hearing back from our patients is one way we can continue to improve our services. Please take a few minutes to complete the written survey that you may receive in the mail after your visit with us. Thank you!             Your Updated Medication List - Protect others around you: Learn how to safely use, store and throw away your medicines at www.disposemymeds.org.          This list is accurate as of 5/9/18  9:58 AM.  Always use your most recent med list.                   Brand Name Dispense Instructions for use Diagnosis    aluminum chloride 20 % external solution    DRYSOL    35 mL    Use daily as directed    Perspiration excessive       clotrimazole-betamethasone cream    LOTRISONE    30 g    Apply topically 2 times daily    Contact dermatitis       escitalopram 20 MG tablet    LEXAPRO    90 tablet    Take 1 tablet (20 mg) by mouth daily    Adjustment disorder with mixed anxiety and depressed mood       traZODone 50 MG tablet    DESYREL    180 tablet    Take 1-2 tablets ( mg) by mouth nightly as needed for sleep    Insomnia       valACYclovir 500 MG tablet    VALTREX    30 tablet    Take 1 tablet (500 mg) by mouth daily    HSV infection

## 2018-05-09 NOTE — PROGRESS NOTES
SUBJECTIVE:   CC: Sloane Aguilar is an 42 year old woman who presents for preventive health visit.     Physical   Annual:     Getting at least 3 servings of Calcium per day::  Yes    Bi-annual eye exam::  Yes    Dental care twice a year::  Yes    Sleep apnea or symptoms of sleep apnea::  None    Diet::  Regular (no restrictions)    Frequency of exercise::  1 day/week    Duration of exercise::  45-60 minutes    Taking medications regularly::  Yes    Medication side effects::  None    Additional concerns today::  No              Would like STD testing.   Fasting for labs.   Hx of uterine ablation.  Gets light spotting around when she should have a period.      Today's PHQ-2 Score:   PHQ-2 ( 1999 Pfizer) 5/9/2018   Q1: Little interest or pleasure in doing things 0   Q2: Feeling down, depressed or hopeless 0   PHQ-2 Score 0   Q1: Little interest or pleasure in doing things Not at all   Q2: Feeling down, depressed or hopeless Not at all   PHQ-2 Score 0       Abuse: Current or Past(Physical, Sexual or Emotional)- No  Do you feel safe in your environment - Yes    Social History   Substance Use Topics     Smoking status: Never Smoker     Smokeless tobacco: Never Used     Alcohol use 0.0 oz/week     0 Standard drinks or equivalent per week      Comment: 2 wine several x weekly     Alcohol Use 5/9/2018   If you drink alcohol do you typically have greater than 3 drinks per day OR greater than 7 drinks per week? No       Reviewed orders with patient.  Reviewed health maintenance and updated orders accordingly - Yes  BP Readings from Last 3 Encounters:   05/09/18 102/74   12/26/17 110/82   01/23/17 110/82    Wt Readings from Last 3 Encounters:   05/09/18 132 lb (59.9 kg)   12/26/17 134 lb (60.8 kg)   01/23/17 129 lb 9.6 oz (58.8 kg)                  Current Outpatient Prescriptions   Medication Sig Dispense Refill     aluminum chloride (DRYSOL) 20 % external solution Use daily as directed 35 mL prn      clotrimazole-betamethasone (LOTRISONE) cream Apply topically 2 times daily 30 g 1     escitalopram (LEXAPRO) 20 MG tablet Take 1 tablet (20 mg) by mouth daily 90 tablet 1     traZODone (DESYREL) 50 MG tablet Take 1-2 tablets ( mg) by mouth nightly as needed for sleep 180 tablet 1     valACYclovir (VALTREX) 500 MG tablet Take 1 tablet (500 mg) by mouth daily 30 tablet 1     [DISCONTINUED] escitalopram (LEXAPRO) 20 MG tablet Take 1 tablet (20 mg) by mouth daily 30 tablet 0     Allergies   Allergen Reactions     Formaldehyde Rash       Patient under age 50, mutual decision reflected in health maintenance.      Pertinent mammograms are reviewed under the imaging tab.  History of abnormal Pap smear: NO - age 30- 65 PAP every 3 years recommended    Reviewed and updated as needed this visit by clinical staff  Tobacco  Allergies  Problems  Med Hx  Soc Hx        Reviewed and updated as needed this visit by Provider        Past Medical History:   Diagnosis Date     anxiety       Past Surgical History:   Procedure Laterality Date     C APPENDECTOMY       HC TOOTH EXTRACTION W/FORCEP       HERNIA REPAIR, UMBILICAL  2007     LEEP TX, CERVICAL  2000    LEEP TX Cervical     TUBAL LIGATION  2007    ablation       Review of Systems  CONSTITUTIONAL: NEGATIVE for fever, chills, change in weight  INTEGUMENTARU/SKIN: NEGATIVE for worrisome rashes, moles or lesions  EYES: NEGATIVE for vision changes or irritation  ENT: NEGATIVE for ear, mouth and throat problems  RESP: NEGATIVE for significant cough or SOB  BREAST: NEGATIVE for masses, tenderness or discharge  CV: NEGATIVE for chest pain, palpitations or peripheral edema  GI: NEGATIVE for nausea, abdominal pain, heartburn, or change in bowel habits  : NEGATIVE for unusual urinary or vaginal symptoms. Light spotting.   MUSCULOSKELETAL: NEGATIVE for significant arthralgias or myalgia  NEURO: NEGATIVE for weakness, dizziness or paresthesias  PSYCHIATRIC: NEGATIVE for changes in  mood or affect     OBJECTIVE:   Wt 132 lb (59.9 kg)  BMI 25.57 kg/m2  Physical Exam  GENERAL: healthy, alert and no distress  EYES: Eyes grossly normal to inspection, PERRL and conjunctivae and sclerae normal  HENT: ear canals and TM's normal, nose and mouth without ulcers or lesions  NECK: no adenopathy, no asymmetry, masses, or scars and thyroid normal to palpation  RESP: lungs clear to auscultation - no rales, rhonchi or wheezes  BREAST: normal without masses, tenderness or nipple discharge and no palpable axillary masses or adenopathy  CV: regular rate and rhythm, normal S1 S2, no S3 or S4, no murmur, click or rub, no peripheral edema and peripheral pulses strong  ABDOMEN: soft, nontender, no hepatosplenomegaly, no masses and bowel sounds normal   (female): normal female external genitalia, normal urethral meatus, vaginal mucosa pink, moist, well rugated, and normal cervix/adnexa/uterus without masses or discharge  MS: no gross musculoskeletal defects noted, no edema  SKIN: no suspicious lesions or rashes  NEURO: Normal strength and tone, mentation intact and speech normal  PSYCH: mentation appears normal, affect normal/bright    ASSESSMENT/PLAN:   1. Routine general medical examination at a health care facility  Normal exam; fasting today for labs.   - Lipid panel reflex to direct LDL Fasting  - Basic metabolic panel    2. Screening for cervical cancer  - Pap imaged thin layer screen with HPV - recommended age 30 - 65 years (select HPV order below)  - HPV High Risk Types DNA Cervical    3. Screen for STD (sexually transmitted disease)  Requesting testing.   - Neisseria gonorrhoeae PCR  - Chlamydia trachomatis PCR  - HIV Antigen Antibody Combo  - Hepatitis C antibody  - Wet prep    4. anxiety  Well controlled.  Refilled.  TAL = 0  - escitalopram (LEXAPRO) 20 MG tablet; Take 1 tablet (20 mg) by mouth daily  Dispense: 90 tablet; Refill: 3    COUNSELING:  Reviewed preventive health counseling, as reflected in  "patient instructions       Regular exercise       Healthy diet/nutrition       Safe sex practices/STD prevention         reports that she has never smoked. She has never used smokeless tobacco.    Estimated body mass index is 25.57 kg/(m^2) as calculated from the following:    Height as of 12/26/17: 5' 0.25\" (1.53 m).    Weight as of this encounter: 132 lb (59.9 kg).       Counseling Resources:  ATP IV Guidelines  Pooled Cohorts Equation Calculator  Breast Cancer Risk Calculator  FRAX Risk Assessment  ICSI Preventive Guidelines  Dietary Guidelines for Americans, 2010  USDA's MyPlate  ASA Prophylaxis  Lung CA Screening    JONAS Patel [facility]  "

## 2018-05-10 LAB
C TRACH DNA SPEC QL NAA+PROBE: NEGATIVE
HCV AB SERPL QL IA: NONREACTIVE
HIV 1+2 AB+HIV1 P24 AG SERPL QL IA: NONREACTIVE
N GONORRHOEA DNA SPEC QL NAA+PROBE: NEGATIVE
SPECIMEN SOURCE: NORMAL
SPECIMEN SOURCE: NORMAL

## 2018-05-10 ASSESSMENT — ANXIETY QUESTIONNAIRES: GAD7 TOTAL SCORE: 0

## 2018-05-10 ASSESSMENT — PATIENT HEALTH QUESTIONNAIRE - PHQ9: SUM OF ALL RESPONSES TO PHQ QUESTIONS 1-9: 0

## 2018-05-14 LAB
COPATH REPORT: ABNORMAL
PAP: ABNORMAL

## 2018-05-15 LAB
FINAL DIAGNOSIS: ABNORMAL
HPV HR 12 DNA CVX QL NAA+PROBE: POSITIVE
HPV16 DNA SPEC QL NAA+PROBE: NEGATIVE
HPV18 DNA SPEC QL NAA+PROBE: NEGATIVE
SPECIMEN DESCRIPTION: ABNORMAL
SPECIMEN SOURCE CVX/VAG CYTO: ABNORMAL

## 2018-05-16 ENCOUNTER — RESULT FOLLOW UP (OUTPATIENT)
Dept: FAMILY MEDICINE | Facility: CLINIC | Age: 43
End: 2018-05-16

## 2018-05-16 DIAGNOSIS — R87.810 ASCUS WITH POSITIVE HIGH RISK HPV CERVICAL: ICD-10-CM

## 2018-05-16 DIAGNOSIS — R87.610 ASCUS WITH POSITIVE HIGH RISK HPV CERVICAL: ICD-10-CM

## 2018-05-16 NOTE — PROGRESS NOTES
"5/9/18 ASCUS, +HR HPV, not 16/18. Plan colp by 8/9/18 5/16/18 Left message to call back. Patient notified of results and recommendations.   8/6/18 [facility] Bx: NICK 1 & 2. Plan discuss LEEP v. [place] with pap in 3 months per Dr. Ovalles ([facility]).  8/31/18 Patient has been informed of results and recommendations per Dr. Ovalles, see path result notes, patient is choosing LEEP treatment. (LN)  9/13/18 I called and left a message for the patient to call back to this writer at [phone] or could call directly to the [facility] and phone number given.Patient has not yet scheduled LEEP as recommended. Patient called back, is aware that LEEP is recommended and does plan to call to schedule.   10/22/18 LEEP: NICK 1, margins neg; ECC: neg. Plan per provider: \"In this patient's situation because of her past history I recommended a Pap with co-testing 4 months after cervical healing, a colposcopy 8 months after cervical healing and Pap smears at 12, 18 and 24 months after healing of the LEEP incision.  If those are normal I told her that she could go back to annual exams with co-testing\". Due for first co-test by 2/22/19.   11/12/18 Post op appt // 12/17/18 2nd post op appt (ajk)  02/08/19 [facility] reminder letter sent. (es)  02/18/19 Dx NIL pap, neg HR HPV. Plan colp in 4 months (ajk)  05/17/19 [facility] reminder letter sent. ([facility])  6/24/19 MyChart reminder sent per Gyn RN. Pt informs that she is unable to complete [facility] at this time. Message sent to provider requesting follow up recommendations. Plan Per provider, cotest this Fall 2019. (LN) (sk)  09/23/19 [facility] reminder letter sent. ([facility])  10/9/19 NIL, +HR HPV, not 16/18. Plan 6 month co-test due by 4/9/20.    10/16/19 MyChart result letter sent to patient  10/30/19 Result letter sent at request of RN. ([facility]) CCT tracking.     "

## 2018-05-16 NOTE — LETTER
February 8, 2019      Sloane R Lauren  [address]  [facility] [address]    Dear ,      At [facility], your health and wellness is our primary concern. That is why we are following up on a LEEP from 10/22/18. Your provider had recommended that you have a Pap smear and HPV test completed by 02/22/19. Our records do not show that this has been scheduled.    It is important to complete the follow up that your provider has suggested for you to ensure that there are no worsening changes which may, over time, develop into cancer.      Please contact our office at  [phone] to schedule an appointment for a Pap smear and HPV test at your earliest convenience. If you have questions or concerns, please call the clinic and we will be happy to assist you.    If you have completed the tests outside of [facility], please have the results forwarded to our office. We will update the chart for your primary Physician to review before your next annual physical.     Thank you for choosing [facility]!    Sincerely,      JONAS Patel CNP/esh

## 2018-05-16 NOTE — LETTER
May 17, 2019      Sloane R Lauren  [address]  [facility] [address]      Dear MsManjit Aguilar,    At [facility], your health and wellness is our primary concern. That is why we are following up on Pap smear  from 02/18/19.  Your Provider had recommended that you have a Colposcopy  completed by 06/18/19. Our records do not show that this has been scheduled.    It is important to complete the follow up that your provider has suggested for you to ensure that there are no worsening changes which may, over time, develop into cancer.      Please call the [facility] at [phone] to schedule an appointment for a Colposcopy (this cannot be scheduled through [facility]) with Dr. Ovalles at your earliest convenience. If you have questions or concerns, please call the clinic and we will be happy to assist you.    If you have completed the tests outside of [facility], please have the results forwarded to our office. We will update the chart for your primary Physician to review before your next annual physical.     Thank you for choosing [facility]!    Sincerely,      Your [facility] Care Team/[facility]

## 2018-07-06 DIAGNOSIS — B00.9 HSV INFECTION: ICD-10-CM

## 2018-07-06 RX ORDER — VALACYCLOVIR HYDROCHLORIDE 500 MG/1
500 TABLET, FILM COATED ORAL DAILY
Qty: 30 TABLET | Refills: 0 | Status: SHIPPED | OUTPATIENT
Start: 2018-07-06 | End: 2018-08-06

## 2018-07-06 NOTE — TELEPHONE ENCOUNTER
Valacyclovir     Last Written Prescription Date: 4/5/18  Last Fill Quantity: 30, # refills: 1  Last Office Visit with [facility], [facility] or [facility] prescribing provider: 8/2015   Next 5 appointments (look out 90 days)     Aug 06, 2018  1:15 PM CDT   Colposcopy with Hany Ovalles MD, [facility] 2   [facility] ([facility])    [address]  [facility] [address]   [phone]                   Creatinine   Date Value Ref Range Status   05/09/2018 0.72 0.52 - 1.04 mg/dL Final     Medication is being filled for 1 time refill only due to:  Patient needs to be seen because it has been more than one year since last visit.     ALEXANDER Mariee RN

## 2018-07-19 ENCOUNTER — HOSPITAL ENCOUNTER (EMERGENCY)
Facility: CLINIC | Age: 43
Discharge: HOME | End: 2018-07-19
Attending: EMERGENCY MEDICINE | Admitting: EMERGENCY MEDICINE
Payer: COMMERCIAL

## 2018-07-19 ENCOUNTER — APPOINTMENT (OUTPATIENT)
Dept: CT IMAGING | Facility: CLINIC | Age: 43
End: 2018-07-19
Attending: EMERGENCY MEDICINE
Payer: COMMERCIAL

## 2018-07-19 VITALS
WEIGHT: 130 LBS | TEMPERATURE: 98 F | RESPIRATION RATE: 18 BRPM | HEART RATE: 98 BPM | OXYGEN SATURATION: 96 % | DIASTOLIC BLOOD PRESSURE: 78 MMHG | BODY MASS INDEX: 25.18 KG/M2 | SYSTOLIC BLOOD PRESSURE: 126 MMHG

## 2018-07-19 DIAGNOSIS — N10 ACUTE PYELONEPHRITIS: ICD-10-CM

## 2018-07-19 LAB
ALBUMIN UR-MCNC: >499 MG/DL
ANION GAP SERPL CALCULATED.3IONS-SCNC: 10 MMOL/L (ref 3–14)
APPEARANCE UR: ABNORMAL
B-HCG FREE SERPL-ACNC: <5 IU/L
BACTERIA #/AREA URNS HPF: ABNORMAL /HPF
BASOPHILS # BLD AUTO: 0.1 10E9/L (ref 0–0.2)
BASOPHILS NFR BLD AUTO: 0.4 %
BILIRUB UR QL STRIP: NEGATIVE
BUN SERPL-MCNC: 4 MG/DL (ref 7–30)
CALCIUM SERPL-MCNC: 8.4 MG/DL (ref 8.5–10.1)
CHLORIDE SERPL-SCNC: 100 MMOL/L (ref 94–109)
CO2 SERPL-SCNC: 23 MMOL/L (ref 20–32)
COLOR UR AUTO: YELLOW
CREAT SERPL-MCNC: 0.75 MG/DL (ref 0.52–1.04)
DIFFERENTIAL METHOD BLD: ABNORMAL
EOSINOPHIL # BLD AUTO: 0 10E9/L (ref 0–0.7)
EOSINOPHIL NFR BLD AUTO: 0.3 %
ERYTHROCYTE [DISTWIDTH] IN BLOOD BY AUTOMATED COUNT: 12.2 % (ref 10–15)
GFR SERPL CREATININE-BSD FRML MDRD: 83 ML/MIN/1.7M2
GLUCOSE SERPL-MCNC: 122 MG/DL (ref 70–99)
GLUCOSE UR STRIP-MCNC: NEGATIVE MG/DL
HCT VFR BLD AUTO: 40.4 % (ref 35–47)
HGB BLD-MCNC: 13.8 G/DL (ref 11.7–15.7)
HGB UR QL STRIP: ABNORMAL
IMM GRANULOCYTES # BLD: 0.1 10E9/L (ref 0–0.4)
IMM GRANULOCYTES NFR BLD: 0.4 %
KETONES UR STRIP-MCNC: NEGATIVE MG/DL
LEUKOCYTE ESTERASE UR QL STRIP: ABNORMAL
LYMPHOCYTES # BLD AUTO: 1.4 10E9/L (ref 0.8–5.3)
LYMPHOCYTES NFR BLD AUTO: 10.3 %
MCH RBC QN AUTO: 33.6 PG (ref 26.5–33)
MCHC RBC AUTO-ENTMCNC: 34.2 G/DL (ref 31.5–36.5)
MCV RBC AUTO: 98 FL (ref 78–100)
MONOCYTES # BLD AUTO: 1.3 10E9/L (ref 0–1.3)
MONOCYTES NFR BLD AUTO: 9.2 %
NEUTROPHILS # BLD AUTO: 10.8 10E9/L (ref 1.6–8.3)
NEUTROPHILS NFR BLD AUTO: 79.4 %
NITRATE UR QL: NEGATIVE
NRBC # BLD AUTO: 0 10*3/UL
NRBC BLD AUTO-RTO: 0 /100
PH UR STRIP: 5 PH (ref 5–7)
PLATELET # BLD AUTO: 297 10E9/L (ref 150–450)
POTASSIUM SERPL-SCNC: 3.5 MMOL/L (ref 3.4–5.3)
RBC # BLD AUTO: 4.11 10E12/L (ref 3.8–5.2)
RBC #/AREA URNS AUTO: >182 /HPF (ref 0–2)
SODIUM SERPL-SCNC: 133 MMOL/L (ref 133–144)
SOURCE: ABNORMAL
SP GR UR STRIP: 1.01 (ref 1–1.03)
SQUAMOUS #/AREA URNS AUTO: 3 /HPF (ref 0–1)
TRANS CELLS #/AREA URNS HPF: 3 /HPF (ref 0–1)
UROBILINOGEN UR STRIP-MCNC: 0 MG/DL (ref 0–2)
WBC # BLD AUTO: 13.6 10E9/L (ref 4–11)
WBC #/AREA URNS AUTO: >182 /HPF (ref 0–5)
WBC CLUMPS #/AREA URNS HPF: PRESENT /HPF

## 2018-07-19 PROCEDURE — 99285 EMERGENCY DEPT VISIT HI MDM: CPT | Mod: 25

## 2018-07-19 PROCEDURE — 96375 TX/PRO/DX INJ NEW DRUG ADDON: CPT

## 2018-07-19 PROCEDURE — 80048 BASIC METABOLIC PNL TOTAL CA: CPT | Performed by: EMERGENCY MEDICINE

## 2018-07-19 PROCEDURE — 81001 URINALYSIS AUTO W/SCOPE: CPT | Performed by: EMERGENCY MEDICINE

## 2018-07-19 PROCEDURE — 84702 CHORIONIC GONADOTROPIN TEST: CPT

## 2018-07-19 PROCEDURE — 85025 COMPLETE CBC W/AUTO DIFF WBC: CPT | Performed by: EMERGENCY MEDICINE

## 2018-07-19 PROCEDURE — 74176 CT ABD & PELVIS W/O CONTRAST: CPT

## 2018-07-19 PROCEDURE — 96361 HYDRATE IV INFUSION ADD-ON: CPT

## 2018-07-19 PROCEDURE — 96365 THER/PROPH/DIAG IV INF INIT: CPT

## 2018-07-19 PROCEDURE — 25000128 H RX IP 250 OP 636: Performed by: EMERGENCY MEDICINE

## 2018-07-19 RX ORDER — ONDANSETRON 2 MG/ML
INJECTION INTRAMUSCULAR; INTRAVENOUS
Status: DISCONTINUED
Start: 2018-07-19 | End: 2018-07-19 | Stop reason: HOSPADM

## 2018-07-19 RX ORDER — KETOROLAC TROMETHAMINE 15 MG/ML
15 INJECTION, SOLUTION INTRAMUSCULAR; INTRAVENOUS ONCE
Status: COMPLETED | OUTPATIENT
Start: 2018-07-19 | End: 2018-07-19

## 2018-07-19 RX ORDER — CEPHALEXIN 500 MG/1
500 CAPSULE ORAL 3 TIMES DAILY
Qty: 30 CAPSULE | Refills: 0 | Status: SHIPPED | OUTPATIENT
Start: 2018-07-19 | End: 2018-07-29

## 2018-07-19 RX ORDER — TRAMADOL HYDROCHLORIDE 50 MG/1
50 TABLET ORAL EVERY 6 HOURS PRN
Qty: 12 TABLET | Refills: 0 | Status: SHIPPED | OUTPATIENT
Start: 2018-07-19 | End: 2019-12-12

## 2018-07-19 RX ORDER — CEFTRIAXONE 1 G/1
1 INJECTION, POWDER, FOR SOLUTION INTRAMUSCULAR; INTRAVENOUS ONCE
Status: COMPLETED | OUTPATIENT
Start: 2018-07-19 | End: 2018-07-19

## 2018-07-19 RX ORDER — HYDROMORPHONE HYDROCHLORIDE 1 MG/ML
0.5 INJECTION, SOLUTION INTRAMUSCULAR; INTRAVENOUS; SUBCUTANEOUS ONCE
Status: COMPLETED | OUTPATIENT
Start: 2018-07-19 | End: 2018-07-19

## 2018-07-19 RX ADMIN — Medication 0.5 MG: at 03:35

## 2018-07-19 RX ADMIN — CEFTRIAXONE SODIUM 1 G: 1 INJECTION, POWDER, FOR SOLUTION INTRAMUSCULAR; INTRAVENOUS at 04:45

## 2018-07-19 RX ADMIN — SODIUM CHLORIDE 1000 ML: 9 INJECTION, SOLUTION INTRAVENOUS at 03:35

## 2018-07-19 RX ADMIN — KETOROLAC TROMETHAMINE 15 MG: 15 INJECTION, SOLUTION INTRAMUSCULAR; INTRAVENOUS at 03:35

## 2018-07-19 ASSESSMENT — ENCOUNTER SYMPTOMS
CHILLS: 1
ABDOMINAL PAIN: 1
FEVER: 1
NAUSEA: 1
FLANK PAIN: 1
VOMITING: 1

## 2018-07-19 NOTE — ED AVS SNAPSHOT
[facility] Emergency Department    [address]    [facility] [address]    Phone:  [phone]    Fax:  [phone]                                       Sloane Aguilar   MRN: [id number]    Department:  [facility] Emergency Department   Date of Visit:  7/19/2018           Patient Information     Date Of Birth          1975        Your diagnoses for this visit were:     Acute pyelonephritis        You were seen by González Marx MD.      Follow-up Information     Follow up with Marisela Stanford MD In 3 days.    Specialty:  Internal Medicine    Why:  for recheck     Contact information:    [address] [facility] [address]  [phone]          Follow up with [facility].    Specialty:  EMERGENCY MEDICINE    Why:  As needed, If symptoms worsen    Contact information:    [address] [place] [address]  [phone]        Discharge Instructions       Discharge Instructions  Urinary Tract Infection  You or your child have been diagnosed with a urinary tract infection, or UTI. The urinary tract includes the kidneys (which make urine/pee), ureters (the tubes that carry urine/pee from the kidneys to the bladder), the bladder (which stores urine/pee), and urethra (the tube that carries urine/pee out of the bladder). Urinary tract infections occur when bacteria travel up the urethra into the bladder (bladder infection) and, in some cases, from there into the kidneys (kidney infection).  Generally, every Emergency Department visit should have a follow-up clinic visit with either a primary or a specialty clinic/provider. Please follow-up as instructed by your emergency provider today.  Return to the Emergency Department if:    You or your child have severe back pain.    You or your child are vomiting (throwing up) so that you cannot take your medicine.    You or your child have a new fever (had not  previously had a fever) over 101 F.    You or your child have confusion or are very weak, or feel very ill.    Your child seems much more ill, will not wake up, will not respond right, or is crying for a long time and will not calm down.    You or your child are showing signs of dehydration. These signs may include decreased urination (pee), dry mouth/gums/tongue, or decreased activity.    Follow-up with your provider:     Children under 24 months need to be seen by their regular provider within one week after a diagnosis of a UTI. It may be necessary to do some more tests to look at the child s kidney or bladder.    You should begin to feel better within 24 - 48 hours of starting your antibiotic; follow-up with your regular clinic/doctor/provider if this is not the case.    Treatment:     You will be treated with an antibiotic to kill the bacteria. We have to make an educated guess, based on what we know about common bacteria and antibiotics, as to which antibiotic will work for your infection. We will be correct most times but there will be some cases where the antibiotic chosen is not correct (see urine cultures below).    Take a pain medication such as acetaminophen (Tylenol ) or ibuprofen (Advil , Motrin , Nuprin ).    Phenazopyridine (Pyridium , Uristat ) is a prescription medication that numbs the bladder to reduce the burning pain of some UTIs.  The same medication is available in a non-prescription version (Azo-Standard , Urodol ). This medication will change the color of the urine and tears (usually blue or orange). If you wear contacts, do not wear them while taking this medication as they may be stained by the medication.    Urine Cultures:    If indicated, a urine culture may have been performed today. This test generally takes 24-48 hours to complete so the results are not known at this time. The results can confirm that an infection is present but also determine which antibiotic is effective for the  "specific bacteria that is causing the infection. If your urine culture shows that the antibiotic you were given today will not work to treat your infection, we will attempt to contact you to make arrangements to change the antibiotic. If the culture confirms that the antibiotic is effective for your infection, you will not be contacted. We often recommend follow-up with your regular physician/provider on the culture results regardless of this process.    Antibiotic Warning:     If you have been placed on antibiotics - watch for signs of allergic reaction.  These include rash, lip swelling, difficulty breathing, wheezing, and dizziness.  If you develop any of these symptoms, stop the antibiotic immediately and go to an emergency room or urgent care for evaluation.    Probiotics: If you have been given an antibiotic, you may want to also take a probiotic pill or eat yogurt with live cultures. Probiotics have \"good bacteria\" to help your intestines stay healthy. Studies have shown that probiotics help prevent diarrhea and other intestine problems (including C. diff infection) when you take antibiotics. You can buy these without a prescription in the pharmacy section of the store.   If you were given a prescription for medicine here today, be sure to read all of the information (including the package insert) that comes with your prescription.  This will include important information about the medicine, its side effects, and any warnings that you need to know about.  The pharmacist who fills the prescription can provide more information and answer questions you may have about the medicine.  If you have questions or concerns that the pharmacist cannot address, please call or return to the Emergency Department.   Remember that you can always come back to the Emergency Department if you are not able to see your regular provider in the amount of time listed above, if you get any new symptoms, or if there is anything that " worries you.      Your next 10 appointments already scheduled     Aug 06, 2018  1:15 PM CDT   Colposcopy with Hany Ovalles MD, [facility] 2   [facility] ([facility])    [address]  [facility] [address] [phone]              24 Hour Appointment Hotline       To make an appointment at any [facility], call [phone] ([phone]). If you don't have a family doctor or clinic, we will help you find one. [facility] are conveniently located to serve the needs of you and your family.             Review of your medicines      START taking        Dose / Directions Last dose taken    cephALEXin 500 MG capsule   Commonly known as:  KEFLEX   Dose:  500 mg   Quantity:  30 capsule        Take 1 capsule (500 mg) by mouth 3 times daily for 10 days   Refills:  0        traMADol 50 MG tablet   Commonly known as:  ULTRAM   Dose:  50 mg   Quantity:  12 tablet        Take 1 tablet (50 mg) by mouth every 6 hours as needed for severe pain   Refills:  0          Our records show that you are taking the medicines listed below. If these are incorrect, please call your family doctor or clinic.        Dose / Directions Last dose taken    aluminum chloride 20 % external solution   Commonly known as:  DRYSOL   Quantity:  35 mL        Use daily as directed   Refills:  prn        clotrimazole-betamethasone cream   Commonly known as:  LOTRISONE   Quantity:  30 g        Apply topically 2 times daily   Refills:  1        escitalopram 20 MG tablet   Commonly known as:  LEXAPRO   Dose:  20 mg   Quantity:  90 tablet        Take 1 tablet (20 mg) by mouth daily   Refills:  3        traZODone 50 MG tablet   Commonly known as:  DESYREL   Dose:   mg   Quantity:  180 tablet        Take 1-2 tablets ( mg) by mouth nightly as needed for sleep   Refills:  1        valACYclovir 500 MG tablet   Commonly known as:  VALTREX   Dose:  500 mg   Quantity:  30 tablet        Take 1  tablet (500 mg) by mouth daily   Refills:  0                Information about OPIOIDS     PRESCRIPTION OPIOIDS: WHAT YOU NEED TO KNOW   We gave you an opioid (narcotic) pain medicine. It is important to manage your pain, but opioids are not always the best choice. You should first try all the other options your care team gave you. Take this medicine for as short a time (and as few doses) as possible.     These medicines have risks:    DO NOT drive when on new or higher doses of pain medicine. These medicines can affect your alertness and reaction times, and you could be arrested for driving under the influence (DUI). If you need to use opioids long-term, talk to your care team about driving.    DO NOT operate heave machinery    DO NOT do any other dangerous activities while taking these medicines.     DO NOT drink any alcohol while taking these medicines.      If the opioid prescribed includes acetaminophen, DO NOT take with any other medicines that contain acetaminophen. Read all labels carefully. Look for the word  acetaminophen  or  Tylenol.  Ask your pharmacist if you have questions or are unsure.    You can get addicted to pain medicines, especially if you have a history of addiction (chemical, alcohol or substance dependence). Talk to your care team about ways to reduce this risk.    Store your pills in a secure place, locked if possible. We will not replace any lost or stolen medicine. If you don t finish your medicine, please throw away (dispose) as directed by your pharmacist. The [place] [employer] has more information about safe disposal: https://www.pca.[facility].[place]/living-green/managing-unwanted-medications.     All opioids tend to cause constipation. Drink plenty of water and eat foods that have a lot of fiber, such as fruits, vegetables, prune juice, apple juice and high-fiber cereal. Take a laxative (Miralax, milk of magnesia, Colace, Senna) if you don t move your bowels at least every  other day.         Prescriptions were sent or printed at these locations (2 Prescriptions)                   Other Prescriptions                Printed at Department/Unit printer (2 of 2)         cephALEXin (KEFLEX) 500 MG capsule               traMADol (ULTRAM) 50 MG tablet                Procedures and tests performed during your visit     Abd/pelvis CT no contrast - Stone Protocol    Basic metabolic panel    CBC with platelets differential    ISTAT HCG Quantitative Pregnancy POCT    UA with Microscopic      Orders Needing Specimen Collection     None      Pending Results     No orders found from 7/17/2018 to 7/20/2018.            Pending Culture Results     No orders found from 7/17/2018 to 7/20/2018.            Pending Results Instructions     If you had any lab results that were not finalized at the time of your Discharge, you can call the ED Lab Result RN at [phone]. You will be contacted by this team for any positive Lab results or changes in treatment. The nurses are available 7 days a week from 10A to 6:30P.  You can leave a message 24 hours per day and they will return your call.        Test Results From Your Hospital Stay        7/19/2018  3:22 AM      Component Results     Component Value Ref Range & Units Status    Color Urine Yellow  Final    Appearance Urine Cloudy  Final    Glucose Urine Negative NEG^Negative mg/dL Final    Bilirubin Urine Negative NEG^Negative Final    Ketones Urine Negative NEG^Negative mg/dL Final    Specific Gravity Urine 1.011 1.003 - 1.035 Final    Blood Urine Large (A) NEG^Negative Final    pH Urine 5.0 5.0 - 7.0 pH Final    Protein Albumin Urine >499 (A) NEG^Negative mg/dL Final    Reviewed, acceptable    Urobilinogen mg/dL 0.0 0.0 - 2.0 mg/dL Final    Nitrite Urine Negative NEG^Negative Final    Leukocyte Esterase Urine Large (A) NEG^Negative Final    Source Midstream Urine  Final    WBC Urine >182 (H) 0 - 5 /HPF Final    RBC Urine >182 (H) 0 - 2 /HPF Final    WBC  Clumps Present (A) NEG^Negative /HPF Final    Bacteria Urine Many (A) NEG^Negative /HPF Final    Squamous Epithelial /HPF Urine 3 (H) 0 - 1 /HPF Final    Transitional Epi 3 (H) 0 - 1 /HPF Final         7/19/2018  3:49 AM      Component Results     Component Value Ref Range & Units Status    WBC 13.6 (H) 4.0 - 11.0 10e9/L Final    RBC Count 4.11 3.8 - 5.2 10e12/L Final    Hemoglobin 13.8 11.7 - 15.7 g/dL Final    Hematocrit 40.4 35.0 - 47.0 % Final    MCV 98 78 - 100 fl Final    MCH 33.6 (H) 26.5 - 33.0 pg Final    MCHC 34.2 31.5 - 36.5 g/dL Final    RDW 12.2 10.0 - 15.0 % Final    Platelet Count 297 150 - 450 10e9/L Final    Diff Method Automated Method  Final    % Neutrophils 79.4 % Final    % Lymphocytes 10.3 % Final    % Monocytes 9.2 % Final    % Eosinophils 0.3 % Final    % Basophils 0.4 % Final    % Immature Granulocytes 0.4 % Final    Nucleated RBCs 0 0 /100 Final    Absolute Neutrophil 10.8 (H) 1.6 - 8.3 10e9/L Final    Absolute Lymphocytes 1.4 0.8 - 5.3 10e9/L Final    Absolute Monocytes 1.3 0.0 - 1.3 10e9/L Final    Absolute Eosinophils 0.0 0.0 - 0.7 10e9/L Final    Absolute Basophils 0.1 0.0 - 0.2 10e9/L Final    Abs Immature Granulocytes 0.1 0 - 0.4 10e9/L Final    Absolute Nucleated RBC 0.0  Final         7/19/2018  3:50 AM      Component Results     Component Value Ref Range & Units Status    Sodium 133 133 - 144 mmol/L Final    Potassium 3.5 3.4 - 5.3 mmol/L Final    Chloride 100 94 - 109 mmol/L Final    Carbon Dioxide 23 20 - 32 mmol/L Final    Anion Gap 10 3 - 14 mmol/L Final    Glucose 122 (H) 70 - 99 mg/dL Final    Urea Nitrogen 4 (L) 7 - 30 mg/dL Final    Creatinine 0.75 0.52 - 1.04 mg/dL Final    GFR Estimate 83 >60 mL/min/1.7m2 Final    Non  GFR Calc    GFR Estimate If Black >90 >60 mL/min/1.7m2 Final    African American GFR Calc    Calcium 8.4 (L) 8.5 - 10.1 mg/dL Final         7/19/2018  4:49 AM      Narrative     CT ABDOMEN PELVIS W/O CONTRAST  7/19/2018 4:01 AM      INDICATION: Right-sided flank pain.     TECHNIQUE: Thin axial images through the abdomen and pelvis without  contrast. Coronal reformatted images. Radiation dose for this scan was  reduced using automated exposure control, adjustment of the mA and/or  kV according to patient size, or iterative reconstruction technique.    COMPARISON: None.    FINDINGS: No renal stones. Mild right hydronephrosis with stranding  and edema about the right ureter, the cause of which is unclear. There  is a 0.2 cm calcification in the right pelvis. This is very close to,  but appears to be outside of, the distal ureter. Small left renal  cyst. The upper abdominal organs are otherwise negative without  contrast.    Uterus is present. Trace pelvic fluid. No suspicious pelvic masses.        Impression     IMPRESSION:  1. Mild right hydronephrosis and periureteral fat stranding/edema, the  cause of which is not determined.  2. This could be due to a recently passed stone, nonvisualized stone,  or other etiology. Upper right urinary tract infection is also a  possibility. Recommend clinical correlation, correlation with  urinalysis, and follow-up.  3. Minimal free pelvic fluid.    CHESTER WRIGHT MD         7/19/2018  3:53 AM      Component Results     Component Value Ref Range & Units Status    HCG Quantitative Serum <5.0 <5.0 IU/L Final                Clinical Quality Measure: Blood Pressure Screening     Your blood pressure was checked while you were in the emergency department today. The last reading we obtained was  BP: 126/78 . Please read the guidelines below about what these numbers mean and what you should do about them.  If your systolic blood pressure (the top number) is less than 120 and your diastolic blood pressure (the bottom number) is less than 80, then your blood pressure is normal. There is nothing more that you need to do about it.  If your systolic blood pressure (the top number) is 120-139 or your diastolic blood  pressure (the bottom number) is 80-89, your blood pressure may be higher than it should be. You should have your blood pressure rechecked within a year by a primary care provider.  If your systolic blood pressure (the top number) is 140 or greater or your diastolic blood pressure (the bottom number) is 90 or greater, you may have high blood pressure. High blood pressure is treatable, but if left untreated over time it can put you at risk for heart attack, stroke, or kidney failure. You should have your blood pressure rechecked by a primary care provider within the next 4 weeks.  If your provider in the emergency department today gave you specific instructions to follow-up with your doctor or provider even sooner than that, you should follow that instruction and not wait for up to 4 weeks for your follow-up visit.        Thank you for choosing [facility]       Thank you for choosing [facility] for your care. Our goal is always to provide you with excellent care. Hearing back from our patients is one way we can continue to improve our services. Please take a few minutes to complete the written survey that you may receive in the mail after you visit with us. Thank you!        [employer] Information     [employer] gives you secure access to your electronic health record. If you see a primary care provider, you can also send messages to your care team and make appointments. If you have questions, please call your primary care clinic.  If you do not have a primary care provider, please call [phone] and they will assist you.        Care EveryWhere ID     This is your Care EveryWhere ID. This could be used by other organizations to access your [facility] medical records  [id number]        Equal Access to Services     TABITHA AGARWAL : Hadii jason monroeo Soguanako, waaxda luqadaha, qaybta kaalmanorah jackson . [facility] [phone].    ATENCIÓN: Si ladonna espander, tiene a pacheco disposición  servicios gratuitos de asistencia lingüística. Omari castro [phone].    We comply with applicable federal civil rights laws and [place] laws. We do not discriminate on the basis of race, color, national origin, age, disability, sex, sexual orientation, or gender identity.            After Visit Summary       This is your record. Keep this with you and show to your community pharmacist(s) and doctor(s) at your next visit.

## 2018-07-19 NOTE — ED AVS SNAPSHOT
[facility] Emergency Department    [address]    [facility] [address]    Phone:  [phone]    Fax:  [phone]                                       Sloane Aguilar   MRN: [id number]    Department:  [facility] Emergency Department   Date of Visit:  7/19/2018           After Visit Summary Signature Page     I have received my discharge instructions, and my questions have been answered. I have discussed any challenges I see with this plan with the nurse or doctor.    ..........................................................................................................................................  Patient/Patient Representative Signature      ..........................................................................................................................................  Patient Representative Print Name and Relationship to Patient    ..................................................               ................................................  Date                                            Time    ..........................................................................................................................................  Reviewed by Signature/Title    ...................................................              ..............................................  Date                                                            Time

## 2018-07-19 NOTE — ED PROVIDER NOTES
History     Chief Complaint:  Rule out Urinary Tract Infection      HPI   Sloane Aguilar is a 42 year old female who presents to the emergency department with her daughter for evaluation of a possible UTI. The patient reports that tonight at 1800 she had the onset of sharp, right sided flank pain. She has also had urgency with urination, epigastric cramping, nausea, chills, and one episode of vomiting. She also had a fever of 101 and notes that she usually runs low. The patient tried drinking cranberry juice for her symptoms but did not take any medication. She denies any history of kidney stones.     Allergies:  No Known Drug Allergies     Medications:    Drysol  Lotrisone  Lexapro  Desyrel  Valtrex    Past Medical History:    Anxiety  Cocaine dependence  ASCUS    Past Surgical History:    Appendectomy  Tooth extraction  Umbilical hernia repair  Cervical leep tx  Tubal ablation    Family History:    Hypertension  Uterine cancer  Heart Disease: Mother, Father, Brother    Social History:  Negative for tobacco use.  Negative for alcohol use.  Marital Status:        Review of Systems   Constitutional: Positive for chills and fever.   Gastrointestinal: Positive for abdominal pain, nausea and vomiting.   Genitourinary: Positive for flank pain and urgency.   All other systems reviewed and are negative.    Physical Exam   First Vitals:  BP: 127/87  Pulse: 98  Heart Rate: 98  Temp: 98  F (36.7  C)  Resp: 18  Weight: 59 kg (130 lb)  SpO2: 99 %      Physical Exam  Nursing note and vitals reviewed.  Constitutional: Cooperative.   HENT:   Mouth/Throat: Moist mucous membranes.   Eyes: EOMI, nonicteric sclera  Cardiovascular: Normal rate, regular rhythm, no murmurs, rubs, or gallops  Pulmonary/Chest: Effort normal and breath sounds normal. No respiratory distress. No wheezes. No rales.   Abdominal: Soft. Nontender, nondistended, no guarding or rigidity. BS present. Right-sided CVA tenderness.   Musculoskeletal: Normal  range of motion.   Neurological: Alert. Moves all extremities spontaneously.   Skin: Skin is warm and dry. No rash noted.   Psychiatric: Normal mood and affect.       Emergency Department Course   Imaging:  Abdomen/Pelvis CT without IV contrast:  IMPRESSION:  1. Mild right hydronephrosis and periureteral fat stranding/edema, the  cause of which is not determined.  2. This could be due to a recently passed stone, nonvisualized stone,  or other etiology. Upper right urinary tract infection is also a  possibility. Recommend clinical correlation, correlation with  urinalysis, and follow-up.  3. Minimal free pelvic fluid.  Report per radiology.     Radiographic findings were communicated with the patient who voiced understanding of the findings.  Imaging independently reviewed and agree with radiologist interpretation.     Laboratory:  CBC: WBC: 13.6, HGB: 13.8, PLT: 297  BMP: Glucose 122 (H), BUN: 4 (L), Calcium: 8.4 (L), o/w WNL (Creatinine: 0.75)    UA: Clear cloudy urine, blood: large, protein albumin: >499, leukocyte esterase: large, WBC/HPF: >182 (H), RBC/HPF: >182 (H), WBC clumps: present, bacteria: many, squamous epithelial/HPF: 3 (H), transitional Epi: 3 (H) otherwise WNL    ISTAT HCG Quantitative pregnancy POCT: <5.0    Interventions:  0335 Dilaudid 0.5 mg IV injection  0335 Toradol 15 mg IV injection  0335 0.9% Sodium Chloride BOLUS 1000 mLs IV injection  0445 Rocephin 1 g IV injection    Emergency Department Course:  0319 Nursing notes and vitals reviewed. I performed an exam of the patient as documented above.     IV inserted. Medicine administered as documented above. Blood drawn. This was sent to the lab for further testing, results above.    The patient provided a urine sample here in the emergency department. This was sent for laboratory testing, findings above.     The patient was sent for an abdomen/pelvis CT while in the emergency department, findings above.     0435 I rechecked the patient and  discussed the results of her workup thus far. The patient is feeling better  at this time. She will be given IV antibiotics and discharged home.    Findings and plan explained to the Patient. Patient discharged home with instructions regarding supportive care, medications, and reasons to return. The importance of close follow-up was reviewed. The patient was prescribed Keflex and Tramadol.    I personally reviewed the laboratory results with the Patient and answered all related questions prior to discharge.     Impression & Plan    Medical Decision Making:  Sloane Aguilar is a 42 year old female who presents for evaluation of flank pain, fever, chills, nausea, vomiting, abdominal pain, and increased urgency in urination.  Urinalysis is consistent with a urinary tract infection; given the systemic symptoms present, signs and symptoms consistent with pyelonephritis.   There is no clinical evidence of perinephric abscess, emphysematous pyelonephritis, ureterolithiasis, appendicitis, colitis, diverticulitis or any intraabdominal catastrophe.  Patient was given dose of antibiotics in ED; see above.    Given well appearance, I believe the risk of imminent deterioration is low enough that outpatient management is indicated.  Return if increasing pain, vomiting, fever, or inability to tolerate the oral antibiotic.  Follow up with primary physician is indicated in 2-3 days.   She is in stable condition at the time of discharge, indications for return to the ED were discussed as well as follow up. All questions were answered and she is in agreement with the plan.      Diagnosis:    ICD-10-CM    1. Acute pyelonephritis N10        Disposition:  discharged to home    Discharge Medications:  Discharge Medication List as of 7/19/2018  5:22 AM      START taking these medications    Details   cephALEXin (KEFLEX) 500 MG capsule Take 1 capsule (500 mg) by mouth 3 times daily for 10 days, Disp-30 capsule, R-0, Local Print       traMADol (ULTRAM) 50 MG tablet Take 1 tablet (50 mg) by mouth every 6 hours as needed for severe pain, Disp-12 tablet, R-0, Local Print           Scribe Disclosure:   I, Jose Miguel Oconnell, am serving as a scribe on 7/19/2018 at 3:19 AM to personally document services performed by Dr. Francisco J MD based on my observations and the provider's statements to me.     Jose Miguel Oconnell  7/19/2018   [facility] EMERGENCY DEPARTMENT       González Marx MD  07/21/18 0118

## 2018-08-06 ENCOUNTER — OFFICE VISIT (OUTPATIENT)
Dept: OBGYN | Facility: CLINIC | Age: 43
End: 2018-08-06
Payer: COMMERCIAL

## 2018-08-06 VITALS — BODY MASS INDEX: 25.95 KG/M2 | WEIGHT: 134 LBS | DIASTOLIC BLOOD PRESSURE: 80 MMHG | SYSTOLIC BLOOD PRESSURE: 126 MMHG

## 2018-08-06 DIAGNOSIS — B00.9 HSV INFECTION: ICD-10-CM

## 2018-08-06 DIAGNOSIS — R87.610 ASCUS WITH POSITIVE HIGH RISK HPV CERVICAL: ICD-10-CM

## 2018-08-06 DIAGNOSIS — R87.810 ASCUS WITH POSITIVE HIGH RISK HPV CERVICAL: ICD-10-CM

## 2018-08-06 PROCEDURE — 57454 BX/CURETT OF CERVIX W/SCOPE: CPT | Performed by: OBSTETRICS & GYNECOLOGY

## 2018-08-06 PROCEDURE — 88305 TISSUE EXAM BY PATHOLOGIST: CPT | Performed by: OBSTETRICS & GYNECOLOGY

## 2018-08-06 RX ORDER — VALACYCLOVIR HYDROCHLORIDE 500 MG/1
TABLET, FILM COATED ORAL
Qty: 30 TABLET | Refills: 3 | Status: SHIPPED | OUTPATIENT
Start: 2018-08-06 | End: 2019-02-13

## 2018-08-06 NOTE — MR AVS SNAPSHOT
After Visit Summary   8/6/2018    Sloane Aguilar    MRN: [id number]           Patient Information     Date Of Birth          1975        Visit Information        Provider Department      8/6/2018 1:15 PM Hany Ovalles MD; [facility]        Today's Diagnoses     ASCUS with positive high risk HPV cervical          Care Instructions    You can reach your [place] Care Team any time of the day by calling [phone]. This number will put you in touch with the 24 hour nurse line if the clinic is closed.    To contact your OB/GYN Station Coordinator/Surgery Scheduler please call [phone]. This is a direct number for your care team between 8 a.m. and 4 p.m. Monday through Friday.    [facility] is open for your convenience:  Monday through Friday 8 a.m. to 6 p.m.  Closed weekends and all major holidays.            Follow-ups after your visit        Follow-up notes from your care team     Return in about 1 week (around 8/13/2018).      Who to contact     If you have questions or need follow up information about today's clinic visit or your schedule please contact [facility] directly at [phone].  Normal or non-critical lab and imaging results will be communicated to you by MyChart, letter or phone within 4 business days after the clinic has received the results. If you do not hear from us within 7 days, please contact the clinic through MyChart or phone. If you have a critical or abnormal lab result, we will notify you by phone as soon as possible.  Submit refill requests through [employer] or call your pharmacy and they will forward the refill request to us. Please allow 3 business days for your refill to be completed.          Additional Information About Your Visit        MyChart Information     [employer] gives you secure access to your electronic health record. If you see a primary care provider, you can also send messages to your care team  and make appointments. If you have questions, please call your primary care clinic.  If you do not have a primary care provider, please call [phone] and they will assist you.        Care EveryWhere ID     This is your Care EveryWhere ID. This could be used by other organizations to access your [place] medical records  [id number]        Your Vitals Were     Last Period BMI (Body Mass Index)                (LMP Unknown) 25.95 kg/m2           Blood Pressure from Last 3 Encounters:   08/06/18 126/80   07/19/18 126/78   05/09/18 102/74    Weight from Last 3 Encounters:   08/06/18 134 lb (60.8 kg)   07/19/18 130 lb (59 kg)   05/09/18 132 lb (59.9 kg)              We Performed the Following     COLP CERVIX/UPPER VAGINA W BX CERVIX/ENDOCERV CURETT        Primary Care Provider Office Phone # Fax #    Roi YAEL Stanford [phone]       [address]  [facility] [address]        Equal Access to Services     [facility]: Hadii aad ku hadasho Soomaali, waaxda luqadaha, qaybta kaalmada adeegyada, waxay dejanin haybernyn jenna fay . So [facility] [phone].    ATENCIÓN: Si habla español, tiene a pacheco disposición servicios gratuitos de asistencia lingüística. Llame al [phone].    We comply with applicable federal civil rights laws and [place] laws. We do not discriminate on the basis of race, color, national origin, age, disability, sex, sexual orientation, or gender identity.            Thank you!     Thank you for choosing [facility]  for your care. Our goal is always to provide you with excellent care. Hearing back from our patients is one way we can continue to improve our services. Please take a few minutes to complete the written survey that you may receive in the mail after your visit with us. Thank you!             Your Updated Medication List - Protect others around you: Learn how to safely use, store and throw away your medicines at www.disposemymeds.org.           This list is accurate as of 8/6/18  2:06 PM.  Always use your most recent med list.                   Brand Name Dispense Instructions for use Diagnosis    aluminum chloride 20 % external solution    DRYSOL    35 mL    Use daily as directed    Perspiration excessive       clotrimazole-betamethasone cream    LOTRISONE    30 g    Apply topically 2 times daily    Contact dermatitis       escitalopram 20 MG tablet    LEXAPRO    90 tablet    Take 1 tablet (20 mg) by mouth daily    Adjustment disorder with mixed anxiety and depressed mood       traMADol 50 MG tablet    ULTRAM    12 tablet    Take 1 tablet (50 mg) by mouth every 6 hours as needed for severe pain        traZODone 50 MG tablet    DESYREL    180 tablet    Take 1-2 tablets ( mg) by mouth nightly as needed for sleep    Insomnia       valACYclovir 500 MG tablet    VALTREX    30 tablet    Take 1 tablet (500 mg) by mouth daily    HSV infection

## 2018-08-06 NOTE — NURSING NOTE
"Chief Complaint   Patient presents with     Colposcopy     2018 ASCUS HPV +HR       Initial /80 (BP Location: Left arm, Patient Position: Sitting, Cuff Size: Adult Regular)  Wt 134 lb (60.8 kg)  LMP  (LMP Unknown)  BMI 25.95 kg/m2 Estimated body mass index is 25.95 kg/(m^2) as calculated from the following:    Height as of 17: 5' 0.25\" (1.53 m).    Weight as of this encounter: 134 lb (60.8 kg).  BP completed using cuff size: regular        The following HM Due: NONE    patient has appointment for today.  Brendon Murguia CMA                 "

## 2018-08-06 NOTE — TELEPHONE ENCOUNTER
"Requested Prescriptions   Pending Prescriptions Disp Refills     valACYclovir (VALTREX) 500 MG tablet [Pharmacy Med Name: VALACYCLOVIR 500MG TABLETS] 30 tablet 0     Sig: TAKE 1 TABLET BY MOUTH DAILY    Antivirals for Herpes Protocol Passed    8/6/2018  2:47 PM       Passed - Patient is age 12 or older       Passed - Recent (12 mo) or future (30 days) visit within the authorizing provider's specialty    Patient had office visit in the last 12 months or has a visit in the next 30 days with authorizing provider or within the authorizing provider's specialty.  See \"Patient Info\" tab in inbasket, or \"Choose Columns\" in Meds & Orders section of the refill encounter.           Passed - Normal serum creatinine on file in past 12 months    Recent Labs   Lab Test  07/19/18   0311   CR  0.75             rx approved per [facility] protocol.        Cara Drake RN    "

## 2018-08-06 NOTE — PROGRESS NOTES
2018  Sloane Aguilar  1975   42 year old white female  TL for contraception    Reason for abnormal colpo: ASCUS Pap with HPV screen positive for other strains of high risk HPV  Referring MD:  Jemma Calixto CNP  Written/Informed consent?  Yes  Patient Education materials?  Yes    No LMP recorded (lmp unknown). Patient has had an ablation.  Pregnant? No  Abnormal bleeding? No    Current Outpatient Prescriptions:      aluminum chloride (DRYSOL) 20 % external solution, Use daily as directed, Disp: 35 mL, Rfl: prn     clotrimazole-betamethasone (LOTRISONE) cream, Apply topically 2 times daily, Disp: 30 g, Rfl: 1     escitalopram (LEXAPRO) 20 MG tablet, Take 1 tablet (20 mg) by mouth daily, Disp: 90 tablet, Rfl: 3     traMADol (ULTRAM) 50 MG tablet, Take 1 tablet (50 mg) by mouth every 6 hours as needed for severe pain, Disp: 12 tablet, Rfl: 0     traZODone (DESYREL) 50 MG tablet, Take 1-2 tablets ( mg) by mouth nightly as needed for sleep, Disp: 180 tablet, Rfl: 1     valACYclovir (VALTREX) 500 MG tablet, Take 1 tablet (500 mg) by mouth daily, Disp: 30 tablet, Rfl: 0  Allergies   Allergen Reactions     Formaldehyde Rash     Contraception: Tubal ligation, endometrial ablation  Age of first intercourse: Teens  Total partners: Several  Current partner: No current steady long-term partner  STD HX: HPV other strains of high risk HPV as noted  STD Culture:No     Sex abuse: No    HPV Vaccine:No  Smoker:No  ETOH: Yes  Drugs: No    Past Medical History:   Diagnosis Date     Abnormal cervical Papanicolaou smear 2018    ASCUS, +HR HPV, not 16/18     anxiety      Social History   Substance Use Topics     Smoking status: Never Smoker     Smokeless tobacco: Never Used     Alcohol use 0.0 oz/week     0 Standard drinks or equivalent per week      Comment: 2 wine several x weekly       Family history of female cancer(s):   Family History   Problem Relation Age of Onset     Hypertension Mother       Cancer Mother      uterine dx 2012     HEART DISEASE Mother      HEART DISEASE Father      HEART DISEASE Brother 48     stint       Past Surgical History:   Procedure Laterality Date     C APPENDECTOMY       HC TOOTH EXTRACTION W/FORCEP       HERNIA REPAIR, UMBILICAL  2007     LEEP TX, CERVICAL  2000    LEEP TX Cervical     TUBAL LIGATION  2007    ablation           Pap Hx:                    Lab Results   Component Value Date    PAP ASC-US 05/09/2018    PAP NIL 08/18/2015    PAP NIL 03/19/2013         Colpo Hx:  Date: LEEP in 2000  Normal since      Treatment HX:  Past Surgical History:   Procedure Laterality Date     C APPENDECTOMY       HC TOOTH EXTRACTION W/FORCEP       HERNIA REPAIR, UMBILICAL  2007     LEEP TX, CERVICAL  2000    LEEP TX Cervical     TUBAL LIGATION  2007    ablation       PROCEDURE:  COLPOSCOPY   After a procedural timeout was taken, she was positioned in dorsal lithotomy and a speculum was inserted to allow visualization of the cervix. A 5% acetic acid solution was applied to the ectocervix with large swabs.   Colposcopic examination was then undertaken of the cervix, distal vaginal canal and vaginal fornices    Yes Transformation zone TZ  Yes  Columnar epithellum C  Yes  Squamocolumnar junction SCJ  No  Original SCJ  Yes  Squamous metaplasia OSCJ  No  Nabothian cyst  N  Yes  Gland openings G  No  Acetowhite epithelium AWE  Yes  Punctuation line /coarse /reverse P  No  Mosaic fine/ coarse  M  No  Atypical vessels  AV  No  Suspect cancer  Ca  Yes  Morton adequate   Normal CE in canal  No abnormal vessels  Vagina and vulva normal  Morton adequate  Biopsies from 5:00 11:00 and ECC, clinical impression soft evidence of HPV rule out very soft NICK-1, lesions biopsied off  (R87.610,  R87.810) ASCUS with positive high risk HPV cervical  Comment: Nothing in vagina for 48 hrs, call for bleeding > a period, pain not relieved w tylenol or advil, call in 1 week to review path and devel a plan    Plan: Plan  reviewed with patient

## 2018-08-06 NOTE — PATIENT INSTRUCTIONS
You can reach your [place] Care Team any time of the day by calling [phone]. This number will put you in touch with the 24 hour nurse line if the clinic is closed.    To contact your OB/GYN Station Coordinator/Surgery Scheduler please call [phone]. This is a direct number for your care team between 8 a.m. and 4 p.m. Monday through Friday.    [facility] is open for your convenience:  Monday through Friday 8 a.m. to 6 p.m.  Closed weekends and all major holidays.

## 2018-08-07 LAB — COPATH REPORT: NORMAL

## 2018-08-31 ENCOUNTER — TELEPHONE (OUTPATIENT)
Dept: OBGYN | Facility: CLINIC | Age: 43
End: 2018-08-31

## 2018-08-31 PROBLEM — R87.810 CERVICAL HIGH RISK HPV (HUMAN PAPILLOMAVIRUS) TEST POSITIVE: Status: ACTIVE | Noted: 2018-05-09

## 2018-08-31 NOTE — TELEPHONE ENCOUNTER
Dr. Ovalles-  Please review and advise for follow up on [facility] pathology completed on 8/6/18.  It does not appear the patient has been notified yet of these results.    Copath Report 08/06/2018  1:45 PM 88   Patient Name: SHANDA AGUILAR   MR#: [id number]   Specimen #: [id number]   Collected: 8/6/2018   Received: 8/6/2018   Reported: 8/7/2018 15:57   Ordering Phy(s): KINGSTON OVALLES     For improved result formatting, select 'View Enhanced Report Format' under    Linked Documents section.     SPECIMEN(S):   A: Cervical biopsy, 11 o'clock   B: Cervical biopsy, 5 o'clock   C: Endocervical curettings     FINAL DIAGNOSIS:   A. Cervix, 11:00, biopsy:   - Ectocervix and transformation zone present.   - Low grade squamous intraepithelial lesion (condyloma/NICK 1).     B. Cervix, 5:00, biopsy:   - Ectocervix and transformation zone present.   - High grade squamous intraepithelial lesion (NICK 2/moderate dysplasia).     C. Endocervix, curetting:   - Endocervical tissue present.   - Negative for viral changes, dysplasia and malignancy.     COMMENT:   Prior Pap smear ([id number]) was signed as atypical squamous cells of   undetermined significance.  HPV Testing:   Positive (Non-16/18 high risk type).          Thank you  Lynette Nissen RN

## 2018-08-31 NOTE — PROGRESS NOTES
I was able to get a hold the patient today and review the path report with her.  We discussed risks benefits and alternative forms of therapy.  The patient would  like to go through a second LEEP procedure.  She will call the office.   also will you   call her ensure that this is  set up.  I will further go over the pathology report from the colposcopy prior to doing the LEEP  Hany Ovalles MD FACOG

## 2018-08-31 NOTE — PROGRESS NOTES
I left a message for the pt to return my call.  I would like to review with her the treatment options for the NICK-2 found on recent colposcopy.  She has had a previous LEEP procedure done.  Treatment options include repeating a LEEP procedure versus repeating a Pap smear and colposcopy in 3 months.  I would like to discuss these with her such that we can come up with a plan.  Please try to reach the patient  Hany Ovalles MD

## 2018-08-31 NOTE — PROGRESS NOTES
I left another phone message for the patient to return my call.  I would like to ideally see her in the office to discuss the treatment options for this recurrence NICK-2 and after that discussion if the patient is amenable.  Can you help her make that appointment please    Hany Ovalles M.D.

## 2018-08-31 NOTE — TELEPHONE ENCOUNTER
Please see the recent phone message note that I left for the patient.  I left her a voicemail message asking her to call back such that we could discuss the treatment options for the NICK-2.  She has had a previous LEEP procedure done in approximately 2000.  Treatment options include repeating a LEEP procedure versus following her expectantly.  I would like to talk with her about these options.  Ideally this should be a discussion that takes place in the office and if at that time she decides to do a LEEP procedure we could do it at that visit.  Please try to contact the patient  Thank you    Hany Ovalles MD FACOG

## 2018-09-05 NOTE — TELEPHONE ENCOUNTER
"Per pathology result notes:  \"I was able to get a hold the patient today and review the path report with her.  We discussed risks benefits and alternative forms of therapy.  The patient would  like to go through a second LEEP procedure.  She will call the office.   also will you   call her ensure that this is  set up.  I will further go over the pathology report from the colposcopy prior to doing the LEEP\"   Hany Ovalles MD FACOG   "

## 2018-09-13 NOTE — TELEPHONE ENCOUNTER
Patient called back, she informs this writer that she does intend to call and schedule the LEEP procedure.  She reports that she does have the scheduling number to call the [facility] to schedule with Dr. Ovalles.  Informed her that we will continue to keep her in our tracking.  Patient verbalizes understanding.  Lynette Nissen RN

## 2018-09-13 NOTE — TELEPHONE ENCOUNTER
I called and left a message for the patient to call back to this writer at [phone] or could call directly to the [facility] and phone number given.  Patient has not yet scheduled LEEP as recommended.  Lynette Nissen RN

## 2018-10-22 ENCOUNTER — ALLIED HEALTH/NURSE VISIT (OUTPATIENT)
Dept: NURSING | Facility: CLINIC | Age: 43
End: 2018-10-22
Payer: COMMERCIAL

## 2018-10-22 ENCOUNTER — OFFICE VISIT (OUTPATIENT)
Dept: OBGYN | Facility: CLINIC | Age: 43
End: 2018-10-22
Payer: COMMERCIAL

## 2018-10-22 VITALS — BODY MASS INDEX: 26.53 KG/M2 | WEIGHT: 137 LBS | DIASTOLIC BLOOD PRESSURE: 80 MMHG | SYSTOLIC BLOOD PRESSURE: 122 MMHG

## 2018-10-22 DIAGNOSIS — N87.1 DYSPLASIA OF CERVIX, HIGH GRADE CIN 2: ICD-10-CM

## 2018-10-22 DIAGNOSIS — Z23 NEED FOR PROPHYLACTIC VACCINATION AND INOCULATION AGAINST INFLUENZA: Primary | ICD-10-CM

## 2018-10-22 DIAGNOSIS — R87.810 CERVICAL HIGH RISK HPV (HUMAN PAPILLOMAVIRUS) TEST POSITIVE: ICD-10-CM

## 2018-10-22 PROCEDURE — 88305 TISSUE EXAM BY PATHOLOGIST: CPT | Performed by: OBSTETRICS & GYNECOLOGY

## 2018-10-22 PROCEDURE — 88307 TISSUE EXAM BY PATHOLOGIST: CPT | Performed by: OBSTETRICS & GYNECOLOGY

## 2018-10-22 PROCEDURE — 90471 IMMUNIZATION ADMIN: CPT

## 2018-10-22 PROCEDURE — 90686 IIV4 VACC NO PRSV 0.5 ML IM: CPT

## 2018-10-22 PROCEDURE — 57460 BX OF CERVIX W/SCOPE LEEP: CPT | Performed by: OBSTETRICS & GYNECOLOGY

## 2018-10-22 NOTE — NURSING NOTE
2% lidocaine with epinephrine used for cervical block.  Lot: -[id number]  Exp: 8/1/19  ND: [id number]  Monica Zavala CMA

## 2018-10-22 NOTE — NURSING NOTE
"Chief Complaint   Patient presents with     Leep       Initial /80  Wt 137 lb (62.1 kg)  BMI 26.53 kg/m2 Estimated body mass index is 26.53 kg/(m^2) as calculated from the following:    Height as of 17: 5' 0.25\" (1.53 m).    Weight as of this encounter: 137 lb (62.1 kg).  BP completed using cuff size: regular    Questioned patient about current smoking habits.  Pt. has never smoked.          The following HM Due: NONE      The following patient reported/Care Every where data was sent to:  P ABSTRACT QUALITY INITIATIVES [[id number]]        Iesha Garcia [facility]                 "

## 2018-10-22 NOTE — MR AVS SNAPSHOT
After Visit Summary   10/22/2018    Sloane Aguilar    MRN: [id number]           Patient Information     Date Of Birth          1975        Visit Information        Provider Department      10/22/2018 9:15 AM FM MA/LPN [facility]        Today's Diagnoses     Need for prophylactic vaccination and inoculation against influenza    -  1       Follow-ups after your visit        Your next 10 appointments already scheduled     Oct 22, 2018  3:15 PM CDT   LEEP with Hany Ovalles MD, RI PROC RM 2   [facility]    [address]  [address]  [facility] [id number] [phone]              Who to contact     If you have questions or need follow up information about today's clinic visit or your schedule please contact [facility] directly at [phone].  Normal or non-critical lab and imaging results will be communicated to you by MyChart, letter or phone within 4 business days after the clinic has received the results. If you do not hear from us within 7 days, please contact the clinic through MyChart or phone. If you have a critical or abnormal lab result, we will notify you by phone as soon as possible.  Submit refill requests through [employer] or call your pharmacy and they will forward the refill request to us. Please allow 3 business days for your refill to be completed.          Additional Information About Your Visit        MyChart Information     [employer] gives you secure access to your electronic health record. If you see a primary care provider, you can also send messages to your care team and make appointments. If you have questions, please call your primary care clinic.  If you do not have a primary care provider, please call [phone] and they will assist you.        Care EveryWhere ID     This is your Care EveryWhere ID. This could be used by other organizations to access your [facility]  records  [id number]         Blood Pressure from Last 3 Encounters:   08/06/18 126/80   07/19/18 126/78   05/09/18 102/74    Weight from Last 3 Encounters:   08/06/18 134 lb (60.8 kg)   07/19/18 130 lb (59 kg)   05/09/18 132 lb (59.9 kg)              We Performed the Following     FLU VACCINE, SPLIT VIRUS, IM (QUADRIVALENT) [93359]- >3 YRS     Vaccine Administration, Initial [96647]        Primary Care Provider Office Phone # Fax #    Marisela CONLEY MD Abdoulaye [phone]       [address] [facility] [address]        Equal Access to Services     [facility] : Hadii jason Ordonez, waharrisda amie, qaybta kaalmada valentino, norah lee. So [facility] [phone].    ATENCIÓN: Si habla español, tiene a pacheco disposición servicios gratuitos de asistencia lingüística. LlThe [facility] [phone].    We comply with applicable federal civil rights laws and [place] laws. We do not discriminate on the basis of race, color, national origin, age, disability, sex, sexual orientation, or gender identity.            Thank you!     Thank you for choosing [facility]  for your care. Our goal is always to provide you with excellent care. Hearing back from our patients is one way we can continue to improve our services. Please take a few minutes to complete the written survey that you may receive in the mail after your visit with us. Thank you!             Your Updated Medication List - Protect others around you: Learn how to safely use, store and throw away your medicines at www.disposemymeds.org.          This list is accurate as of 10/22/18  9:44 AM.  Always use your most recent med list.                   Brand Name Dispense Instructions for use Diagnosis    aluminum chloride 20 % external solution    DRYSOL    35 mL    Use daily as directed    Perspiration excessive       clotrimazole-betamethasone cream    LOTRISONE    30 g    Apply topically 2 times daily     Contact dermatitis       escitalopram 20 MG tablet    LEXAPRO    90 tablet    Take 1 tablet (20 mg) by mouth daily    Adjustment disorder with mixed anxiety and depressed mood       traMADol 50 MG tablet    ULTRAM    12 tablet    Take 1 tablet (50 mg) by mouth every 6 hours as needed for severe pain        traZODone 50 MG tablet    DESYREL    180 tablet    Take 1-2 tablets ( mg) by mouth nightly as needed for sleep    Insomnia       valACYclovir 500 MG tablet    VALTREX    30 tablet    TAKE 1 TABLET BY MOUTH DAILY    HSV infection

## 2018-10-22 NOTE — PROGRESS NOTES

## 2018-10-22 NOTE — PATIENT INSTRUCTIONS
You can reach your [facility] Care Team any time of the day by calling [phone]. This number will put you in touch with the 24 hour nurse line if the clinic is closed.    To contact your OB/GYN Station Coordinator/Surgery Scheduler please call [phone]. This is a direct number for your care team between 8 a.m. and 4 p.m. Monday through Friday.    [facility] is open for your convenience:  Monday through Friday 8 a.m. to 6 p.m.  Closed weekends and all major holidays.

## 2018-10-22 NOTE — MR AVS SNAPSHOT
After Visit Summary   10/22/2018    Sloane Aguilar    MRN: [id number]           Patient Information     Date Of Birth          1975        Visit Information        Provider Department      10/22/2018 3:15 PM Hany Ovalles MD; [facility] [place] [facility]        Today's Diagnoses     Dysplasia of cervix, high grade NICK 2        Cervical high risk HPV (human papillomavirus) test positive          Care Instructions    You can reach your [place] Care Team any time of the day by calling [phone]. This number will put you in touch with the 24 hour nurse line if the clinic is closed.    To contact your OB/GYN Station Coordinator/Surgery Scheduler please call [phone]. This is a direct number for your care team between 8 a.m. and 4 p.m. Monday through Friday.    [facility] is open for your convenience:  Monday through Friday 8 a.m. to 6 p.m.  Closed weekends and all major holidays.            Follow-ups after your visit        Follow-up notes from your care team     Return in about 2 weeks (around 11/5/2018).      Who to contact     If you have questions or need follow up information about today's clinic visit or your schedule please contact [facility] directly at [phone].  Normal or non-critical lab and imaging results will be communicated to you by MyChart, letter or phone within 4 business days after the clinic has received the results. If you do not hear from us within 7 days, please contact the clinic through [employer] or phone. If you have a critical or abnormal lab result, we will notify you by phone as soon as possible.  Submit refill requests through [employer] or call your pharmacy and they will forward the refill request to us. Please allow 3 business days for your refill to be completed.          Additional Information About Your Visit        [employer] Information     [employer] gives you secure access to your electronic health record. If you see a  primary care provider, you can also send messages to your care team and make appointments. If you have questions, please call your primary care clinic.  If you do not have a primary care provider, please call [phone] and they will assist you.        Care EveryWhere ID     This is your Care EveryWhere ID. This could be used by other organizations to access your [facility] medical records  [id number]        Your Vitals Were     BMI (Body Mass Index)                   26.53 kg/m2            Blood Pressure from Last 3 Encounters:   10/22/18 122/80   08/06/18 126/80   07/19/18 126/78    Weight from Last 3 Encounters:   10/22/18 137 lb (62.1 kg)   08/06/18 134 lb (60.8 kg)   07/19/18 130 lb (59 kg)              We Performed the Following     COLP CERVIX/UPPER VAGINA W LOOP ELEC BX CERVIX     Surgical pathology exam        Primary Care Provider Office Phone # Fax #    Moodyumari G MD Abdoulaye [phone]       [address] [facility] [address]        Equal Access to Services     [facility]: Hadii aad ku hadasho Soomaali, waaxda luqadaha, qaybta kaalmada adeegyada, waxay isabella haykarissa fay . So [facility] [phone].    ATENCIÓN: Si habla español, tiene a pacheco disposición servicios gratuitos de asistencia lingüística. Llame al [phone].    We comply with applicable federal civil rights laws and [place] laws. We do not discriminate on the basis of race, color, national origin, age, disability, sex, sexual orientation, or gender identity.            Thank you!     Thank you for choosing [facility]  for your care. Our goal is always to provide you with excellent care. Hearing back from our patients is one way we can continue to improve our services. Please take a few minutes to complete the written survey that you may receive in the mail after your visit with us. Thank you!             Your Updated Medication List - Protect others around you: Learn how to safely  use, store and throw away your medicines at www.disposemymeds.org.          This list is accurate as of 10/22/18 11:59 PM.  Always use your most recent med list.                   Brand Name Dispense Instructions for use Diagnosis    aluminum chloride 20 % external solution    DRYSOL    35 mL    Use daily as directed    Perspiration excessive       clotrimazole-betamethasone cream    LOTRISONE    30 g    Apply topically 2 times daily    Contact dermatitis       escitalopram 20 MG tablet    LEXAPRO    90 tablet    Take 1 tablet (20 mg) by mouth daily    Adjustment disorder with mixed anxiety and depressed mood       traMADol 50 MG tablet    ULTRAM    12 tablet    Take 1 tablet (50 mg) by mouth every 6 hours as needed for severe pain        traZODone 50 MG tablet    DESYREL    180 tablet    Take 1-2 tablets ( mg) by mouth nightly as needed for sleep    Insomnia       valACYclovir 500 MG tablet    VALTREX    30 tablet    TAKE 1 TABLET BY MOUTH DAILY    HSV infection

## 2018-10-23 NOTE — PROGRESS NOTES
Sloane Aguilar is a 43 year old female  TL for contraception presents today to discuss the results of her recent colposcopy and to consider a LEEP procedure.  The colposcopy from 2018 showed the following  FINAL DIAGNOSIS:   A. Cervix, 11:00, biopsy:   - Ectocervix and transformation zone present.   - Low grade squamous intraepithelial lesion (condyloma/NICK 1).     B. Cervix, 5:00, biopsy:   - Ectocervix and transformation zone present.   - High grade squamous intraepithelial lesion (NICK 2/moderate dysplasia).     C. Endocervix, curetting:   - Endocervical tissue present.   - Negative for viral changes, dysplasia and malignancy.     Risks, benefits, and alternative modes of therapy discussed at length. Pathophysiology of the disease process reviewed, all of the patients questions answered and informed consent obtained.  After this discussion the patient made her decision to proceed with a LEEP procedure.  I think she has a good understanding of the nature the problem the nature the procedure the risks and benefits the alternatives the last 100% efficacy and need for close follow-up post procedurally.  Postoperative activity restrictions including pelvic rest have also been discussed.  Past Medical History:   Diagnosis Date     Abnormal cervical Papanicolaou smear 2018    see problem list     anxiety      Cervical high risk HPV (human papillomavirus) test positive 2018    See problem list     Past Surgical History:   Procedure Laterality Date     C APPENDECTOMY       HC TOOTH EXTRACTION W/FORCEP       HERNIA REPAIR, UMBILICAL       LEEP TX, CERVICAL  2000    LEEP TX Cervical     TUBAL LIGATION      ablation      ROS: 10 point ROS neg other than the symptoms noted above in the HPI.  /80  Wt 137 lb (62.1 kg)  BMI 26.53 kg/m2  Constitutional: healthy, alert and no distress  Genitourinary: Normal external genitalia without lesions and  after obtaining iformed consent colposcopy was  preformed confirming the previous findings.  the pt was then prepped and the cervix infiltrated with approximately 12 cc's of 2% lidocaine with epinephrine.  The LEEP conization was then preformed with a medium sized loop in such a manner to outline all abnormal areas.  The endocervical canal was examined showing normal columnar epithelium and an ECC was preformed.  the base of the LEEP was then treated with the ball cautery device and monsels solution.  Several mm's of additional nl ectocervix were treated as well.  At the completion of the procedure hemostasis was adequate and counts correct.  the pt tolerated the procedure well without concerns.  Post op cares, sx's of complicatons, pelvic rest and activity limitations and the need for f/u were rev.    (N87.1) Dysplasia of cervix, high grade NICK 2  Comment: Pelvic rest and activity restrictions reviewed.  Signs and symptoms of concern discussed patient will call  Plan: Surgical pathology exam, COLP CERVIX/UPPER         VAGINA W LOOP ELEC BX CERVIX        I will see the patient back in 10-14 days to review the pathology report and again in 6 weeks to document cervical healing.  When the cervix is healed I would like to do a Pap smear 4 months later a colposcopy 8 months later and Paps at 12,18 and 24 months.  If those are normal the patient can go back to annual exams with Paps and co-test    (R87.810) Cervical high risk HPV (human papillomavirus) test positive  Comment: As above  Plan: Surgical pathology exam, COLP CERVIX/UPPER         VAGINA W LOOP ELEC BX CERVIX        Written outline of our discussion given

## 2018-10-24 LAB — COPATH REPORT: NORMAL

## 2018-10-25 NOTE — PROGRESS NOTES
Sloane, the pathology report from the LEEP specimen shows that the dysplasia/NICK were completely excised.  I can go over this more when I see you back in the office in follow-up.  Please let me know if you have any questions in the interval  Hany Ovalles M.D.

## 2018-11-12 ENCOUNTER — OFFICE VISIT (OUTPATIENT)
Dept: OBGYN | Facility: CLINIC | Age: 43
End: 2018-11-12
Payer: COMMERCIAL

## 2018-11-12 VITALS — DIASTOLIC BLOOD PRESSURE: 76 MMHG | SYSTOLIC BLOOD PRESSURE: 122 MMHG | BODY MASS INDEX: 26.92 KG/M2 | WEIGHT: 139 LBS

## 2018-11-12 DIAGNOSIS — R61 PERSPIRATION EXCESSIVE: ICD-10-CM

## 2018-11-12 DIAGNOSIS — N87.1 DYSPLASIA OF CERVIX, HIGH GRADE CIN 2: ICD-10-CM

## 2018-11-12 DIAGNOSIS — R87.810 CERVICAL HIGH RISK HPV (HUMAN PAPILLOMAVIRUS) TEST POSITIVE: ICD-10-CM

## 2018-11-12 DIAGNOSIS — F51.01 PRIMARY INSOMNIA: Primary | ICD-10-CM

## 2018-11-12 PROCEDURE — 99213 OFFICE O/P EST LOW 20 MIN: CPT | Performed by: OBSTETRICS & GYNECOLOGY

## 2018-11-12 RX ORDER — TRAZODONE HYDROCHLORIDE 50 MG/1
50-100 TABLET, FILM COATED ORAL
Qty: 180 TABLET | Refills: 1 | Status: SHIPPED | OUTPATIENT
Start: 2018-11-12 | End: 2023-07-11

## 2018-11-12 NOTE — MR AVS SNAPSHOT
After Visit Summary   11/12/2018    Sloane Aguilar    MRN: [id number]           Patient Information     Date Of Birth          1975        Visit Information        Provider Department      11/12/2018 10:15 AM Hany Ovalles MD [facility]        Today's Diagnoses     Primary insomnia    -  1    Perspiration excessive        Dysplasia of cervix, high grade NICK 2        Cervical high risk HPV (human papillomavirus) test positive          Care Instructions    You can reach your [place] Care Team any time of the day by calling [phone]. This number will put you in touch with the 24 hour nurse line if the clinic is closed.    To contact your OB/GYN Station Coordinator/Surgery Scheduler please call [phone]. This is a direct number for your care team between 8 a.m. and 4 p.m. Monday through Friday.    [facility] is open for your convenience:  Monday through Friday 8 a.m. to 6 p.m.  Closed weekends and all major holidays.            Follow-ups after your visit        Follow-up notes from your care team     Return in about 1 month (around 12/12/2018).      Your next 10 appointments already scheduled     Dec 17, 2018  1:45 PM CST   SHORT with Hany Ovalles MD   [facility] ([facility]    [address]  [address]  [facility] [address] [phone]              Who to contact     If you have questions or need follow up information about today's clinic visit or your schedule please contact [facility] directly at [phone].  Normal or non-critical lab and imaging results will be communicated to you by MyChart, letter or phone within 4 business days after the clinic has received the results. If you do not hear from us within 7 days, please contact the clinic through MyChart or phone. If you have a critical or abnormal lab result, we will notify you by phone as soon as possible.  Submit refill requests  through [employer] or call your pharmacy and they will forward the refill request to us. Please allow 3 business days for your refill to be completed.          Additional Information About Your Visit        [employer] Information     [employer] gives you secure access to your electronic health record. If you see a primary care provider, you can also send messages to your care team and make appointments. If you have questions, please call your primary care clinic.  If you do not have a primary care provider, please call [phone] and they will assist you.        Care EveryWhere ID     This is your Care EveryWhere ID. This could be used by other organizations to access your [facility] medical records  [id number]        Your Vitals Were     BMI (Body Mass Index)                   26.92 kg/m2            Blood Pressure from Last 3 Encounters:   11/12/18 122/76   10/22/18 122/80   08/06/18 126/80    Weight from Last 3 Encounters:   11/12/18 139 lb (63 kg)   10/22/18 137 lb (62.1 kg)   08/06/18 134 lb (60.8 kg)              Today, you had the following     No orders found for display         Where to get your medicines      These medications were sent to [facility] [address] [place] [address] [address]  AT [facility] [address]  [address] [facility] [address]     Phone:  [phone]     aluminum chloride 20 % external solution    traZODone 50 MG tablet          Primary Care Provider Office Phone # Fax #    Krishnakumari YAEL Stanford [phone]       [address] [facility] [address]        Equal Access to Services     [facility]: Hadii aad ku hadasho Soomaali, waaxda luqadaha, qaybta kaalmada adeegyaedson, norah lee. So [facility] [phone].    ATENCIÓN: Si habla español, tiene a pacheco disposición servicios gratuitos de asistencia lingüística. Llame al [phone].    We comply with applicable federal civil rights laws and [place] laws. We do not  discriminate on the basis of race, color, national origin, age, disability, sex, sexual orientation, or gender identity.            Thank you!     Thank you for choosing [facility]  for your care. Our goal is always to provide you with excellent care. Hearing back from our patients is one way we can continue to improve our services. Please take a few minutes to complete the written survey that you may receive in the mail after your visit with us. Thank you!             Your Updated Medication List - Protect others around you: Learn how to safely use, store and throw away your medicines at www.disposemymeds.org.          This list is accurate as of 11/12/18 11:59 PM.  Always use your most recent med list.                   Brand Name Dispense Instructions for use Diagnosis    aluminum chloride 20 % external solution    DRYSOL    35 mL    Use daily as directed    Perspiration excessive       clotrimazole-betamethasone cream    LOTRISONE    30 g    Apply topically 2 times daily    Contact dermatitis       escitalopram 20 MG tablet    LEXAPRO    90 tablet    Take 1 tablet (20 mg) by mouth daily    Adjustment disorder with mixed anxiety and depressed mood       traMADol 50 MG tablet    ULTRAM    12 tablet    Take 1 tablet (50 mg) by mouth every 6 hours as needed for severe pain        traZODone 50 MG tablet    DESYREL    180 tablet    Take 1-2 tablets ( mg) by mouth nightly as needed for sleep    Primary insomnia       valACYclovir 500 MG tablet    VALTREX    30 tablet    TAKE 1 TABLET BY MOUTH DAILY    HSV infection

## 2018-11-12 NOTE — PATIENT INSTRUCTIONS
You can reach your [facility] Care Team any time of the day by calling [phone]. This number will put you in touch with the 24 hour nurse line if the clinic is closed.    To contact your OB/GYN Station Coordinator/Surgery Scheduler please call [phone]. This is a direct number for your care team between 8 a.m. and 4 p.m. Monday through Friday.    [facility] is open for your convenience:  Monday through Friday 8 a.m. to 6 p.m.  Closed weekends and all major holidays.

## 2018-11-12 NOTE — NURSING NOTE
"Chief Complaint   Patient presents with     Follow Up For     Leep       Initial /76  Wt 139 lb (63 kg)  BMI 26.92 kg/m2 Estimated body mass index is 26.92 kg/(m^2) as calculated from the following:    Height as of 17: 5' 0.25\" (1.53 m).    Weight as of this encounter: 139 lb (63 kg).  BP completed using cuff size: regular    Questioned patient about current smoking habits.  Pt. has never smoked.          The following HM Due: NONE      The following patient reported/Care Every where data was sent to:  P ABSTRACT QUALITY INITIATIVES [98514]        Iesha Garcia [facility]               "

## 2018-11-13 NOTE — PROGRESS NOTES
In this patient's situation because of her past history I recommended a Pap with co-testing 4 months after cervical healing, a colposcopy 8 months after cervical healing and Pap smears at 12, 18 and 24 months after healing of the LEEP incision.  If those are normal I told her that she could go back to annual exams with co-testing.  Thank you

## 2018-11-15 NOTE — PROGRESS NOTES
I reviewed this path report with her and reviewed the pathophysiology of NICK I reviewed this pathology report with her and the pathophysiology of NICK Sloane Aguilar is a 43 year old female  TL for contraception presents today to discuss the results of her recent colposcopy and  LEEP procedure results.   She is presently doing well without concerns and is having minimal watery discharge.  No signs or symptoms of concern.  The pathology report revealed the following    FINAL DIAGNOSIS:   A: Cervix, LEEP biopsy-   - Low-grade squamous intraepithelial lesion (NICK 1).   - Margins: Uninvolved ectocervical, endocervical, and radial margins.  NICK    1 extends close to the endocervical   margin.   - Negative for high-grade squamous intraepithelial lesion, glandular   dysplasia, or malignancy.   - Sampling includes: Ectocervix, transformation zone, and endocervix.     B: Endocervix, curettings-   - Negative for squamous intraepithelial lesion, glandular dysplasia, or   malignancy.   - Sampling includes: Fragments of ectocervical mucosa and endocervical   Glands.        Pathophysiology of the disease process reviewed, all of the patients questions answered     /76  Wt 139 lb (63 kg)  BMI 26.92 kg/m2  Constitutional: healthy, alert and no distress  Genitourinary: Normal external genitalia without lesions and speculum the cervix is healing nicely the office is patent no lesions are seen    (F51.01) Primary insomnia  (primary encounter diagnosis)  Comment: Patient would like a refill on her trazodone she uses this infrequently  Plan: traZODone (DESYREL) 50 MG tablet        Done    (R61) Perspiration excessive  Comment: Patient would like a refill on Drysol  Plan: aluminum chloride (DRYSOL) 20 % external         solution        Done    (N87.1) Dysplasia of cervix, high grade NICK 2  Comment: Status post LEEP with negative margins doing well  Plan: She will maintain pelvic rest for another month we will see her back  in a month for follow-up.  If the cervix is healed at that time I would anticipate doing a Pap smear in 4 months, a colposcopy in 8 months, and Paps and 1218 and 24 months.  If those are normal she can go back to annual exams    (R87.810) Cervical high risk HPV (human papillomavirus) test positive  Comment: Detailed written outline and plan given  Plan: Done

## 2018-12-17 ENCOUNTER — OFFICE VISIT (OUTPATIENT)
Dept: OBGYN | Facility: CLINIC | Age: 43
End: 2018-12-17
Payer: COMMERCIAL

## 2018-12-17 VITALS — BODY MASS INDEX: 27.31 KG/M2 | DIASTOLIC BLOOD PRESSURE: 80 MMHG | SYSTOLIC BLOOD PRESSURE: 122 MMHG | WEIGHT: 141 LBS

## 2018-12-17 DIAGNOSIS — H92.02 LEFT EAR PAIN: ICD-10-CM

## 2018-12-17 DIAGNOSIS — R30.0 BURNING WITH URINATION: Primary | ICD-10-CM

## 2018-12-17 LAB
ALBUMIN UR-MCNC: NEGATIVE MG/DL
APPEARANCE UR: CLEAR
BACTERIA #/AREA URNS HPF: ABNORMAL /HPF
BILIRUB UR QL STRIP: NEGATIVE
COLOR UR AUTO: YELLOW
GLUCOSE UR STRIP-MCNC: NEGATIVE MG/DL
HGB UR QL STRIP: NEGATIVE
KETONES UR STRIP-MCNC: NEGATIVE MG/DL
LEUKOCYTE ESTERASE UR QL STRIP: ABNORMAL
NITRATE UR QL: NEGATIVE
NON-SQ EPI CELLS #/AREA URNS LPF: ABNORMAL /LPF
PH UR STRIP: 7 PH (ref 5–7)
RBC #/AREA URNS AUTO: ABNORMAL /HPF
SOURCE: ABNORMAL
SP GR UR STRIP: <=1.005 (ref 1–1.03)
UROBILINOGEN UR STRIP-ACNC: 0.2 EU/DL (ref 0.2–1)
WBC #/AREA URNS AUTO: ABNORMAL /HPF

## 2018-12-17 PROCEDURE — 81001 URINALYSIS AUTO W/SCOPE: CPT | Performed by: OBSTETRICS & GYNECOLOGY

## 2018-12-17 PROCEDURE — 99213 OFFICE O/P EST LOW 20 MIN: CPT | Performed by: OBSTETRICS & GYNECOLOGY

## 2018-12-17 NOTE — PROGRESS NOTES
Sloane Aguilar is a 43 year old female  TL for contraception who presents today for evaluation of mild left flank pain, fullness and a plugged sensation in her left ear as well as follow-up of a LEEP procedure done 10/22/2018.  Patient denies any fevers or chills.  Notices fullness in the left ear.  No hearing loss.  She states that she has a history of earwax and has been trying to get some of it out without success.  She also notices some mild left flank pain.  In 2018 the patient underwent a CT of the abdomen and pelvis with stone protocol to rule out nephrolithiasis.  There was some mild right hydronephrosis seen findings consistent perhaps with a previous stone that passed spontaneously.  Denies any significant dysuria or hematuria at this time    Past Medical History:   Diagnosis Date     Abnormal cervical Papanicolaou smear 2018    see problem list     anxiety      Cervical high risk HPV (human papillomavirus) test positive 2018    See problem list     Past Surgical History:   Procedure Laterality Date     C APPENDECTOMY       HC TOOTH EXTRACTION W/FORCEP       HERNIA REPAIR, UMBILICAL       LEEP TX, CERVICAL  ,     LEEP TX Cervical     TUBAL LIGATION  2007    ablation      ROS: 10 point ROS neg other than the symptoms noted above in the HPI.  /80   Wt 64 kg (141 lb)   BMI 27.31 kg/m    Constitutional: healthy, alert and no distress  Head: Normocephalic. No masses, lesions, tenderness or abnormalities  Neck: Neck supple. No adenopathy. Thyroid symmetric, normal size,, Carotids without bruits.  ENT: Right ear canal and TM are within normal limits left ear canal had wax.  The nurse irrigated and cleaned of the wax from the canal in the usual sterile fashion.  Examination of the left TM revealed no evidence of any otitis media or other infectious process the canal otherwise is unremarkable    Lungs: Clear to auscultation percussion perhaps some mild left-sided CVA  tenderness  Cardiovascular: negative, PMI normal. No lifts, heaves, or thrills. RRR. No murmurs, clicks gallops or rub  Respiratory: negative, Percussion normal. Good diaphragmatic excursion. Lungs clear mild left CVA tenderness  Gastrointestinal: Abdomen soft, non-tender. BS normal. No masses, organomegaly  Genitourinary: wnl: The cervix is patent well-healed.  Bimanual exam uterus is small smooth firm mobile adnexa without masses or enlargement, rectovaginal exam confirms    (R30.0) Burning with urination  (primary encounter diagnosis)  Comment: Urinalysis revealed 2-5 red cells per high-power field with no white cells and positive squamous epithelial cells and bacteria.  I suspect some of this may be a contaminant.  I reviewed this finding with the patient and discussed the causes and evaluation of microscopic hematuria.  I reviewed the recent CT scan that she had from July of this year showing a normal left collecting system other than a simple cyst of the kidney with no evidence of hydronephrosis on the left side after this lengthy discussion we made a decision to observe and see how her symptoms are over the next several days.  She is going to push fluids and she will also empirically begin a trial of Tylenol 650 mg every 4 hours and let us know later in this week if her symptoms have not abated.  If her symptoms are still present will consider CT scan  Plan: UA reflex to Microscopic and Culture, Urine         Microscopic        Written plan given    (H92.02) Left ear pain  Comment: I do not see evidence of an otitis today.  She states that she is feeling better since removal of the earwax  Plan: Patient will observe and let us know if her symptoms do not continue to improve or if there is an acute exacerbation

## 2018-12-17 NOTE — PATIENT INSTRUCTIONS
You can reach your [facility] Care Team any time of the day by calling [phone]. This number will put you in touch with the 24 hour nurse line if the clinic is closed.    To contact your OB/GYN Station Coordinator/Surgery Scheduler please call [phone]. This is a direct number for your care team between 8 a.m. and 4 p.m. Monday through Friday.    [facility] is open for your convenience:  Monday through Friday 8 a.m. to 6 p.m.  Closed weekends and all major holidays.

## 2018-12-21 DIAGNOSIS — M54.50 LOWER BACK PAIN: ICD-10-CM

## 2018-12-21 DIAGNOSIS — R30.0 BURNING WITH URINATION: Primary | ICD-10-CM

## 2018-12-21 NOTE — PROGRESS NOTES
Per your last note you had said you would consider another CT if pt is still having sx.  She calls and states that she has lower left side back pain and would like CT scan.  Ordered.    Shanae RUELAS R.N.  [facility]

## 2018-12-26 ENCOUNTER — HOSPITAL ENCOUNTER (OUTPATIENT)
Dept: CT IMAGING | Facility: CLINIC | Age: 43
Discharge: HOME | End: 2018-12-26
Attending: OBSTETRICS & GYNECOLOGY | Admitting: OBSTETRICS & GYNECOLOGY
Payer: COMMERCIAL

## 2018-12-26 DIAGNOSIS — R30.0 BURNING WITH URINATION: ICD-10-CM

## 2018-12-26 DIAGNOSIS — M54.50 LOWER BACK PAIN: ICD-10-CM

## 2018-12-26 PROCEDURE — 74176 CT ABD & PELVIS W/O CONTRAST: CPT

## 2019-01-03 NOTE — RESULT ENCOUNTER NOTE
I reviewed the results of my most recent office visit with the patient as well as her most recent CT scan of 12/26/2018.  At present the patient states the discomfort is mild and low-grade.  We discussed signs and symptoms of complications and concerns and she is in a call should any of these occur.  The patient will schedule follow-up Pap with co-testing in 6 months.  I have answered all her questions and I think she has a good understanding of the plan    Hany Ovalles MD FACOG

## 2019-02-13 ENCOUNTER — MYC REFILL (OUTPATIENT)
Dept: OBGYN | Facility: CLINIC | Age: 44
End: 2019-02-13

## 2019-02-13 DIAGNOSIS — L25.9 CONTACT DERMATITIS: ICD-10-CM

## 2019-02-13 DIAGNOSIS — B00.9 HSV INFECTION: ICD-10-CM

## 2019-02-13 DIAGNOSIS — F43.23 ADJUSTMENT DISORDER WITH MIXED ANXIETY AND DEPRESSED MOOD: ICD-10-CM

## 2019-02-13 RX ORDER — VALACYCLOVIR HYDROCHLORIDE 500 MG/1
TABLET, FILM COATED ORAL
Qty: 30 TABLET | Refills: 1 | Status: SHIPPED | OUTPATIENT
Start: 2019-02-13 | End: 2019-05-15

## 2019-02-13 RX ORDER — ESCITALOPRAM OXALATE 20 MG/1
TABLET ORAL
Qty: 90 TABLET | Refills: 0 | Status: SHIPPED | OUTPATIENT
Start: 2019-02-13 | End: 2019-05-15

## 2019-02-13 RX ORDER — CLOTRIMAZOLE AND BETAMETHASONE DIPROPIONATE 10; .64 MG/G; MG/G
CREAM TOPICAL 2 TIMES DAILY
Qty: 30 G | Refills: 1 | Status: SHIPPED | OUTPATIENT
Start: 2019-02-13 | End: 2023-03-28

## 2019-02-13 NOTE — TELEPHONE ENCOUNTER
Return in about 1 year (around 5/9/2019) for Physical Exam.   PHQ-9 SCORE 3/2/2010 12/26/2017 5/9/2018   PHQ-9 Total Score 12 - -   PHQ-9 Total Score - 0 0     TAL-7 SCORE 7/29/2014 12/26/2017 5/9/2018   Total Score 0 - -   Total Score - 0 0     Sent SeatMe message to complete assessments  Prescription approved per [facility] Refill Protocol  Sussy Cedillo RN BS

## 2019-02-13 NOTE — TELEPHONE ENCOUNTER
"Requested Prescriptions   Pending Prescriptions Disp Refills     clotrimazole-betamethasone (LOTRISONE) 1-0.05 % external cream 30 g 1     Sig: Apply topically 2 times daily    Topical Steroids and Nonsteroidals Protocol Passed - 2/13/2019 11:37 AM       Passed - Patient is age 6 or older       Passed - Authorizing prescriber's most recent note related to this medication read.    If refill request is for ophthalmic use, please forward request to provider for approval.         Passed - High potency steroid not ordered       Passed - Recent (12 mo) or future (30 days) visit within the authorizing provider's specialty    Patient had office visit in the last 12 months or has a visit in the next 30 days with authorizing provider or within the authorizing provider's specialty.  See \"Patient Info\" tab in inbasket, or \"Choose Columns\" in Meds & Orders section of the refill encounter.             Passed - Medication is active on med list        rx approved per [facility] protocol.      Cara Drake RN    "

## 2019-02-13 NOTE — TELEPHONE ENCOUNTER
"Requested Prescriptions   Pending Prescriptions Disp Refills     escitalopram (LEXAPRO) 20 MG tablet [Pharmacy Med Name: ESCITALOPRAM 20MG TABLETS] 90 tablet 0    Last Written Prescription Date:  05/09/2018  Last Fill Quantity: 90 tablet,  # refills: 3   Last office visit: 5/9/2018 with prescribing provider:  05/09/2018   Future Office Visit:   Next 5 appointments (look out 90 days)    Feb 18, 2019  9:15 AM CST  SHORT with Hany Ovalles MD  [facility] [address]  [address]  [facility] [id number] [phone]          Sig: TAKE 1 TABLET(20 MG) BY MOUTH DAILY    SSRIs Protocol Passed - 2/13/2019 11:54 AM       Passed - Recent (12 mo) or future (30 days) visit within the authorizing provider's specialty    Patient had office visit in the last 12 months or has a visit in the next 30 days with authorizing provider or within the authorizing provider's specialty.  See \"Patient Info\" tab in inbasket, or \"Choose Columns\" in Meds & Orders section of the refill encounter.             Passed - Medication is active on med list       Passed - Patient is age 18 or older       Passed - No active pregnancy on record       Passed - No positive pregnancy test in last 12 months        Khris AVELAR  "

## 2019-02-13 NOTE — TELEPHONE ENCOUNTER
"Requested Prescriptions   Pending Prescriptions Disp Refills     valACYclovir (VALTREX) 500 MG tablet [Pharmacy Med Name: VALACYCLOVIR 500MG TABLETS] 30 tablet 0     Sig: TAKE 1 TABLET BY MOUTH DAILY    Antivirals for Herpes Protocol Passed - 2/13/2019 11:40 AM       Passed - Patient is age 12 or older       Passed - Recent (12 mo) or future (30 days) visit within the authorizing provider's specialty    Patient had office visit in the last 12 months or has a visit in the next 30 days with authorizing provider or within the authorizing provider's specialty.  See \"Patient Info\" tab in inbasket, or \"Choose Columns\" in Meds & Orders section of the refill encounter.             Passed - Medication is active on med list       Passed - Normal serum creatinine on file in past 12 months    Recent Labs   Lab Test 07/19/18  0311   CR 0.75             rx approved per [facility] protocol.      Cara Drake RN    "

## 2019-02-18 ENCOUNTER — OFFICE VISIT (OUTPATIENT)
Dept: OBGYN | Facility: CLINIC | Age: 44
End: 2019-02-18
Payer: COMMERCIAL

## 2019-02-18 VITALS — DIASTOLIC BLOOD PRESSURE: 76 MMHG | SYSTOLIC BLOOD PRESSURE: 114 MMHG | BODY MASS INDEX: 27.31 KG/M2 | WEIGHT: 141 LBS

## 2019-02-18 DIAGNOSIS — R87.810 CERVICAL HIGH RISK HPV (HUMAN PAPILLOMAVIRUS) TEST POSITIVE: ICD-10-CM

## 2019-02-18 DIAGNOSIS — N87.1 DYSPLASIA OF CERVIX, HIGH GRADE CIN 2: ICD-10-CM

## 2019-02-18 PROCEDURE — 99213 OFFICE O/P EST LOW 20 MIN: CPT | Performed by: OBSTETRICS & GYNECOLOGY

## 2019-02-18 PROCEDURE — 88175 CYTOPATH C/V AUTO FLUID REDO: CPT | Performed by: OBSTETRICS & GYNECOLOGY

## 2019-02-18 PROCEDURE — 87624 HPV HI-RISK TYP POOLED RSLT: CPT | Performed by: OBSTETRICS & GYNECOLOGY

## 2019-02-18 NOTE — PATIENT INSTRUCTIONS
You can reach your [facility] Care Team any time of the day by calling [phone]. This number will put you in touch with the 24 hour nurse line if the clinic is closed.    To contact your OB/GYN Station Coordinator/Surgery Scheduler please call [phone]. This is a direct number for your care team between 8 a.m. and 4 p.m. Monday through Friday.    [facility] is open for your convenience:  Monday through Friday 8 a.m. to 6 p.m.  Closed weekends and all major holidays.

## 2019-02-18 NOTE — NURSING NOTE
"Chief Complaint   Patient presents with     Repeat Pap Smear       Initial /76   Wt 64 kg (141 lb)   BMI 27.31 kg/m   Estimated body mass index is 27.31 kg/m  as calculated from the following:    Height as of 17: 1.53 m (5' 0.25\").    Weight as of this encounter: 64 kg (141 lb).  BP completed using cuff size: regular    Questioned patient about current smoking habits.  Pt. has never smoked.          The following HM Due: NONE      The following patient reported/Care Every where data was sent to:  P ABSTRACT QUALITY INITIATIVES [33790]        Iesha Garcia, [facility]                 " Hemostasis: Drysol 2017

## 2019-02-19 NOTE — PROGRESS NOTES
Sloane Aguilar is a 43 year old female  No LMP recorded. Patient has had an ablation.  Status post tubal sterilization who presents today for follow-up Pap smear following a LEEP procedure done 10/22/2018 for NICK-2 with the 5 o'clock position.  The patient is otherwise doing well without concerns.  Her previously noted flank discomfort has resolved.  She has no other concerns today    Past Medical History:   Diagnosis Date     Abnormal cervical Papanicolaou smear 2018    see problem list     anxiety      Cervical high risk HPV (human papillomavirus) test positive 2018    See problem list     Past Surgical History:   Procedure Laterality Date     C APPENDECTOMY       HC TOOTH EXTRACTION W/FORCEP       HERNIA REPAIR, UMBILICAL  2007     LEEP TX, CERVICAL  ,     LEEP TX Cervical     TUBAL LIGATION      ablation      ROS: 10 point ROS neg other than the symptoms noted above in the HPI.  /76   Wt 64 kg (141 lb)   BMI 27.31 kg/m    Constitutional: healthy, alert and no distress  Genitourinary: Normal external genitalia without lesions and speculum multiparous appearing cervix no lesions seen, Endo and ectocervical Pap with co-testing completed.  Bimanual exam the uterus is small smooth firm mobile adnexa without masses enlargement or tenderness    (N87.1) Dysplasia of cervix, high grade NICK 2  Comment: Repeat Pap with co-testing done today  Plan: Pap imaged thin layer diagnostic with HPV         (select HPV order below), HPV High Risk Types         DNA Cervical        If this is normal would anticipate a colposcopy in 4 months and Paps at 12,18 and 24 months post LEEP if this normal go back to annual exams    (R87.810) Cervical high risk HPV (human papillomavirus) test positive  Comment: Plan reviewed with the patient  Plan: Pap imaged thin layer diagnostic with HPV         (select HPV order below), HPV High Risk Types         DNA Cervical        As above

## 2019-02-21 LAB
COPATH REPORT: NORMAL
PAP: NORMAL

## 2019-02-22 LAB
FINAL DIAGNOSIS: NORMAL
HPV HR 12 DNA CVX QL NAA+PROBE: NEGATIVE
HPV16 DNA SPEC QL NAA+PROBE: NEGATIVE
HPV18 DNA SPEC QL NAA+PROBE: NEGATIVE
SPECIMEN DESCRIPTION: NORMAL
SPECIMEN SOURCE CVX/VAG CYTO: NORMAL

## 2019-05-15 DIAGNOSIS — B00.9 HSV INFECTION: ICD-10-CM

## 2019-05-15 RX ORDER — VALACYCLOVIR HYDROCHLORIDE 500 MG/1
TABLET, FILM COATED ORAL
Qty: 30 TABLET | Refills: 0 | Status: SHIPPED | OUTPATIENT
Start: 2019-05-15 | End: 2019-06-23

## 2019-06-23 DIAGNOSIS — B00.9 HSV INFECTION: ICD-10-CM

## 2019-06-24 ENCOUNTER — MYC MEDICAL ADVICE (OUTPATIENT)
Dept: OBGYN | Facility: CLINIC | Age: 44
End: 2019-06-24

## 2019-06-24 DIAGNOSIS — R87.810 CERVICAL HIGH RISK HPV (HUMAN PAPILLOMAVIRUS) TEST POSITIVE: ICD-10-CM

## 2019-06-24 DIAGNOSIS — N87.1 DYSPLASIA OF CERVIX, HIGH GRADE CIN 2: ICD-10-CM

## 2019-06-24 RX ORDER — VALACYCLOVIR HYDROCHLORIDE 500 MG/1
TABLET, FILM COATED ORAL
Qty: 30 TABLET | Refills: 0 | Status: SHIPPED | OUTPATIENT
Start: 2019-06-24 | End: 2019-08-17

## 2019-06-24 NOTE — TELEPHONE ENCOUNTER
Medication is being filled for 1 time refill only due to:  Patient needs to be seen because she is due for a repeat colp 6/2019. My chart message sent to the pt .     Jennifer JOHNS RN

## 2019-07-02 NOTE — TELEPHONE ENCOUNTER
Pt called to let us know that she currently cannot afford to have a colposcopy done.     Jennifer JOHNS RN

## 2019-07-07 ENCOUNTER — OFFICE VISIT (OUTPATIENT)
Dept: URGENT CARE | Facility: URGENT CARE | Age: 44
End: 2019-07-07
Payer: COMMERCIAL

## 2019-07-07 ENCOUNTER — ANCILLARY PROCEDURE (OUTPATIENT)
Dept: GENERAL RADIOLOGY | Facility: CLINIC | Age: 44
End: 2019-07-07
Attending: FAMILY MEDICINE
Payer: COMMERCIAL

## 2019-07-07 ENCOUNTER — TELEPHONE (OUTPATIENT)
Dept: INTERNAL MEDICINE | Facility: CLINIC | Age: 44
End: 2019-07-07

## 2019-07-07 VITALS
HEART RATE: 68 BPM | BODY MASS INDEX: 27.31 KG/M2 | OXYGEN SATURATION: 99 % | TEMPERATURE: 98.3 F | WEIGHT: 141 LBS | SYSTOLIC BLOOD PRESSURE: 114 MMHG | DIASTOLIC BLOOD PRESSURE: 74 MMHG

## 2019-07-07 DIAGNOSIS — S69.91XA HAND INJURY, RIGHT, INITIAL ENCOUNTER: ICD-10-CM

## 2019-07-07 DIAGNOSIS — S62.316A CLOSED DISPLACED FRACTURE OF BASE OF FIFTH METACARPAL BONE OF RIGHT HAND, INITIAL ENCOUNTER: Primary | ICD-10-CM

## 2019-07-07 PROCEDURE — 29125 APPL SHORT ARM SPLINT STATIC: CPT | Performed by: FAMILY MEDICINE

## 2019-07-07 PROCEDURE — 73130 X-RAY EXAM OF HAND: CPT | Mod: RT

## 2019-07-07 PROCEDURE — 99213 OFFICE O/P EST LOW 20 MIN: CPT | Mod: 25 | Performed by: FAMILY MEDICINE

## 2019-07-07 NOTE — PROGRESS NOTES
SUBJECTIVE:  Sloane Aguilar is a 43 year old female who presents for injury:  Location: right hand  Occured how long ago?: 7/3/19   Injured hand, but going out of town on vacation for the holidays, so wasn't seen until tonight.   How?:  Fell awkwardly on her outstretched right hand that felt like it twisted on landing.  Bruising and swelling developed soon after injury.     No numbness or tingling.   Hurts with hand and finger movement, mainly ulnar side.    No h/o significant injury or surgery this hand.    ROS:  5-Point Review of Systems Negative-- Except as stated above.    EXAM:   /74   Pulse 68   Temp 98.3  F (36.8  C) (Oral)   Wt 64 kg (141 lb)   SpO2 99%   BMI 27.31 kg/m    General: healthy, alert and no distress  Involved injury area: right hand  Description of injury site {EXAM INJURY: mild generalized swelling to posterior hand, bruising extensively over posterior hand the and the palm of the hand, ulnar side.  Skin: Intact at site  Neurovascular status: There is not compromise to the distal circulation.    Range of motion of the affected area: Full Range of Motion of fingers, hand, wrist with discomfort.    X-RAY was done.    Study Result     RIGHT HAND THREE VIEWS   7/7/2019 5:12 PM      HISTORY: Hand injury, right, initial encounter.     COMPARISON: None.                                                                      IMPRESSION: There is an oblique mildly comminuted fracture of the  radial aspect of the base of the fifth metacarpal with displacement of  the fracture fragments by about 0.3 cm.      FREEDOM QUINTEROS MD         ASSESSMENT/PLAN:     ICD-10-CM    1. Closed displaced fracture of base of fifth metacarpal bone of right hand, initial encounter S62.316A ORTHO  REFERRAL     APPLY SHORT ARM SPLINT STATIC   2. Hand injury, right, initial encounter S69.91XA XR Hand Right G/E 3 Views     Short arm posterior and anterior hand to proximal forearm splint applied to immobilize  hand.  Sensation and ROM of fingers intact following application.   Discussed may need surgical fixation with location of the injury.   Injury from 7/3, this is initial visit.   Placed in splint tonight for protection and will f/u tomorrow with ortho through [facility] [place] or through her own orthopedics clinic, which she plans to call first thing in the morning.   Ibuprofen, ice for discomfort prn.      Patient Instructions   Keep the splint dry, and don't remove until you see orthopedics tomorrow.    Call tomorrow for appointment at your own orthopedics office or call the [place]  number.

## 2019-07-07 NOTE — TELEPHONE ENCOUNTER
7/7/2019-Request Right hand images for future care    7/7/2019-AUTUMN signed by patient    7/7/2019-CD with Right hand images delivered to patient

## 2019-07-08 ENCOUNTER — OFFICE VISIT (OUTPATIENT)
Dept: ORTHOPEDICS | Facility: CLINIC | Age: 44
End: 2019-07-08
Payer: COMMERCIAL

## 2019-07-08 VITALS
DIASTOLIC BLOOD PRESSURE: 78 MMHG | HEIGHT: 60 IN | BODY MASS INDEX: 27.68 KG/M2 | SYSTOLIC BLOOD PRESSURE: 120 MMHG | WEIGHT: 141 LBS

## 2019-07-08 DIAGNOSIS — S62.316A CLOSED DISPLACED FRACTURE OF BASE OF FIFTH METACARPAL BONE OF RIGHT HAND, INITIAL ENCOUNTER: Primary | ICD-10-CM

## 2019-07-08 PROCEDURE — 29125 APPL SHORT ARM SPLINT STATIC: CPT | Performed by: ORTHOPAEDIC SURGERY

## 2019-07-08 PROCEDURE — 99203 OFFICE O/P NEW LOW 30 MIN: CPT | Mod: 25 | Performed by: ORTHOPAEDIC SURGERY

## 2019-07-08 ASSESSMENT — MIFFLIN-ST. JEOR: SCORE: 1206.07

## 2019-07-08 NOTE — PROGRESS NOTES
HISTORY OF PRESENT ILLNESS:    Sloane Aguilar is a 43 year old female who is seen in follow-up from Urgent Care for right hand injury. Date of injury on 7/3/19 after falling on out stretched hand, noting a twisting sensation feeling a pop in sesation.  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     Present symptoms: pain along the ulnar aspect of the hand into the 4th- 5th fingers. Patients states bruising and swelling present.  Patient states pain is dull, throbbing, and shooting. Patient states that since her injury she has not been using her right hand for activities. She notes increased pain with gripping, grasping, and ulnar deviation. Denies numbness and tingling in the hand. She states her pain has waken her at nighttime.   She is right-hand dominant  Current pain level: 5/10,  Worst pain level: 9/10   Treatments tried to this point:  Ibuprofen, icing, wrapping, and recent splint.   Orthopedic PMH: none    Past Medical History:   Diagnosis Date     Abnormal cervical Papanicolaou smear 05/09/2018    see problem list     anxiety      Cervical high risk HPV (human papillomavirus) test positive 5/9/2018    See problem list       Past Surgical History:   Procedure Laterality Date     C APPENDECTOMY       HC TOOTH EXTRACTION W/FORCEP       HERNIA REPAIR, UMBILICAL  2007     LEEP TX, CERVICAL  2000, 2018    LEEP TX Cervical     TUBAL LIGATION  2007    ablation       Family History   Problem Relation Age of Onset     Hypertension Mother      Cancer Mother         uterine dx 2012     Heart Disease Mother      Heart Disease Father      Heart Disease Brother 48        stint       Social History     Socioeconomic History     Marital status:      Spouse name: Not on file     Number of children: Not on file     Years of education: Not on file     Highest education level: Not on file   Occupational History     Not on file   Social Needs     Financial resource strain: Not on file     Food insecurity:      Worry: Not on file     Inability: Not on file     Transportation needs:     Medical: Not on file     Non-medical: Not on file   Tobacco Use     Smoking status: Never Smoker     Smokeless tobacco: Never Used   Substance and Sexual Activity     Alcohol use: Yes     Alcohol/week: 0.0 oz     Comment: 2 wine several x weekly     Drug use: No     Sexual activity: Yes     Partners: Male     Birth control/protection: Surgical, Female Surgical     Comment: TL   Lifestyle     Physical activity:     Days per week: Not on file     Minutes per session: Not on file     Stress: Not on file   Relationships     Social connections:     Talks on phone: Not on file     Gets together: Not on file     Attends Islam service: Not on file     Active member of club or organization: Not on file     Attends meetings of clubs or organizations: Not on file     Relationship status: Not on file     Intimate partner violence:     Fear of current or ex partner: Not on file     Emotionally abused: Not on file     Physically abused: Not on file     Forced sexual activity: Not on file   Other Topics Concern     Parent/sibling w/ CABG, MI or angioplasty before 65F 55M? Yes     Comment: brother stint at 48   Social History Narrative     Not on file       Current Outpatient Medications   Medication Sig Dispense Refill     aluminum chloride (DRYSOL) 20 % external solution Use daily as directed 35 mL prn     clotrimazole-betamethasone (LOTRISONE) 1-0.05 % external cream Apply topically 2 times daily 30 g 1     escitalopram (LEXAPRO) 20 MG tablet Take 1 tablet (20 mg) by mouth daily DUE FOR APPOINTMENT MAY 2019. NO FURTHER REFILLS 30 tablet 0     escitalopram (LEXAPRO) 20 MG tablet Take 1 tablet (20 mg) by mouth daily 90 tablet 3     traZODone (DESYREL) 50 MG tablet Take 1-2 tablets ( mg) by mouth nightly as needed for sleep 180 tablet 1     valACYclovir (VALTREX) 500 MG tablet TAKE 1 TABLET BY MOUTH EVERY DAY 30 tablet 0     traMADol (ULTRAM) 50  MG tablet Take 1 tablet (50 mg) by mouth every 6 hours as needed for severe pain (Patient not taking: Reported on 7/8/2019) 12 tablet 0       Allergies   Allergen Reactions     Formaldehyde Rash       REVIEW OF SYSTEMS:  CONSTITUTIONAL:  NEGATIVE for fever, chills, change in weight  INTEGUMENTARY/SKIN:  NEGATIVE for worrisome rashes, moles or lesions  EYES:  NEGATIVE for vision changes or irritation  ENT/MOUTH:  NEGATIVE for ear, mouth and throat problems  RESP:  NEGATIVE for significant cough or SOB  BREAST:  NEGATIVE for masses, tenderness or discharge  CV:  NEGATIVE for chest pain, palpitations or peripheral edema  GI:  NEGATIVE for nausea, abdominal pain, heartburn, or change in bowel habits  :  Negative   MUSCULOSKELETAL:  See HPI above  NEURO:  NEGATIVE for weakness, dizziness or paresthesias  ENDOCRINE:  NEGATIVE for temperature intolerance, skin/hair changes  HEME/ALLERGY/IMMUNE:  NEGATIVE for bleeding problems  PSYCHIATRIC:  NEGATIVE for changes in mood or affect      PHYSICAL EXAM:  /78 (BP Location: Right arm, Patient Position: Chair, Cuff Size: Adult Regular)   Ht 1.524 m (5')   Wt 64 kg (141 lb)   BMI 27.54 kg/m    Body mass index is 27.54 kg/m .   GENERAL APPEARANCE: healthy, alert and no distress   HEENT: No apparent thyroid megaly. Clear sclera with normal ocular movement  RESPIRATORY: No labored breathing  SKIN: no suspicious lesions or rashes  NEURO: Normal strength and tone, mentation intact and speech normal  VASCULAR: Good pulses, and capillary refill   LYMPH: no lymphadenopathy   PSYCH:  mentation appears normal and affect normal/bright    MUSCULOSKELETAL:  Proximal end of the current splint is digging into her skin causing some irritation  Splint was removed  Presence of mild ecchymosis in the fingers of fourth and fifth rays noted  Very slight swelling in the hand and the fingers, right compared to the left  No rotational deformity with a gentle flexion  Range of motion is  limited  Localized tenderness at the base of fifth metacarpal  Skin is intact  Circulation is intact  Sensation is intact      ASSESSMENT:  Base of fifth metacarpal fracture with displacement  No evidence of dislocation at the CMC joint however    PLAN:  The images of the x-rays from July 7, 2019 were visualized.  Findings are thoroughly explained.  The nature of injury was explained.  Nonoperative treatment with immobilization for the next 4 to 5 weeks would be appropriate.  A remote possibility of surgical intervention was mentioned.  A new ulnar gutter splint will be placed for her to be off briefly for showers but otherwise protect the hand consistently.  All the questions were answered.  New x-rays when she returns to clinic.  Follow-up in 4 weeks.      Imaging Interpretation:     Recent Results (from the past 744 hour(s))   XR Hand Right G/E 3 Views    Narrative    RIGHT HAND THREE VIEWS   7/7/2019 5:12 PM     HISTORY: Hand injury, right, initial encounter.    COMPARISON: None.      Impression    IMPRESSION: There is an oblique mildly comminuted fracture of the  radial aspect of the base of the fifth metacarpal with displacement of  the fracture fragments by about 0.3 cm.     MD Piero CARRERO MD  Department of Orthopedic Surgery        Disclaimer: This note consists of symbols derived from keyboarding, dictation and/or voice recognition software. As a result, there may be errors in the script that have gone undetected. Please consider this when interpreting information found in this chart.

## 2019-07-08 NOTE — LETTER
7/8/2019         RE: Sloane Aguilar  [id number]  [facility] [address]        Dear Colleague,    Thank you for referring your patient, Sloane Aguilar, to the [facility] ORTHOPEDIC SURGERY. Please see a copy of my visit note below.    HISTORY OF PRESENT ILLNESS:    Sloane Aguilar is a 43 year old female who is seen in follow-up from Urgent Care for right hand injury. Date of injury on 7/3/19 after falling on out stretched hand, noting a twisting sensation feeling a pop in sesation.  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     Present symptoms: pain along the ulnar aspect of the hand into the 4th- 5th fingers. Patients states bruising and swelling present.  Patient states pain is dull, throbbing, and shooting. Patient states that since her injury she has not been using her right hand for activities. She notes increased pain with gripping, grasping, and ulnar deviation. Denies numbness and tingling in the hand. She states her pain has waken her at nighttime.   She is right-hand dominant  Current pain level: 5/10,  Worst pain level: 9/10   Treatments tried to this point:  Ibuprofen, icing, wrapping, and recent splint.   Orthopedic PMH: none    Past Medical History:   Diagnosis Date     Abnormal cervical Papanicolaou smear 05/09/2018    see problem list     anxiety      Cervical high risk HPV (human papillomavirus) test positive 5/9/2018    See problem list       Past Surgical History:   Procedure Laterality Date     C APPENDECTOMY       HC TOOTH EXTRACTION W/FORCEP       HERNIA REPAIR, UMBILICAL  2007     LEEP TX, CERVICAL  2000, 2018    LEEP TX Cervical     TUBAL LIGATION  2007    ablation       Family History   Problem Relation Age of Onset     Hypertension Mother      Cancer Mother         uterine dx 2012     Heart Disease Mother      Heart Disease Father      Heart Disease Brother 48        stint       Social History     Socioeconomic History     Marital status:      Spouse  name: Not on file     Number of children: Not on file     Years of education: Not on file     Highest education level: Not on file   Occupational History     Not on file   Social Needs     Financial resource strain: Not on file     Food insecurity:     Worry: Not on file     Inability: Not on file     Transportation needs:     Medical: Not on file     Non-medical: Not on file   Tobacco Use     Smoking status: Never Smoker     Smokeless tobacco: Never Used   Substance and Sexual Activity     Alcohol use: Yes     Alcohol/week: 0.0 oz     Comment: 2 wine several x weekly     Drug use: No     Sexual activity: Yes     Partners: Male     Birth control/protection: Surgical, Female Surgical     Comment: TL   Lifestyle     Physical activity:     Days per week: Not on file     Minutes per session: Not on file     Stress: Not on file   Relationships     Social connections:     Talks on phone: Not on file     Gets together: Not on file     Attends Taoism service: Not on file     Active member of club or organization: Not on file     Attends meetings of clubs or organizations: Not on file     Relationship status: Not on file     Intimate partner violence:     Fear of current or ex partner: Not on file     Emotionally abused: Not on file     Physically abused: Not on file     Forced sexual activity: Not on file   Other Topics Concern     Parent/sibling w/ CABG, MI or angioplasty before 65F 55M? Yes     Comment: brother stint at 48   Social History Narrative     Not on file       Current Outpatient Medications   Medication Sig Dispense Refill     aluminum chloride (DRYSOL) 20 % external solution Use daily as directed 35 mL prn     clotrimazole-betamethasone (LOTRISONE) 1-0.05 % external cream Apply topically 2 times daily 30 g 1     escitalopram (LEXAPRO) 20 MG tablet Take 1 tablet (20 mg) by mouth daily DUE FOR APPOINTMENT MAY 2019. NO FURTHER REFILLS 30 tablet 0     escitalopram (LEXAPRO) 20 MG tablet Take 1 tablet (20 mg) by  mouth daily 90 tablet 3     traZODone (DESYREL) 50 MG tablet Take 1-2 tablets ( mg) by mouth nightly as needed for sleep 180 tablet 1     valACYclovir (VALTREX) 500 MG tablet TAKE 1 TABLET BY MOUTH EVERY DAY 30 tablet 0     traMADol (ULTRAM) 50 MG tablet Take 1 tablet (50 mg) by mouth every 6 hours as needed for severe pain (Patient not taking: Reported on 7/8/2019) 12 tablet 0       Allergies   Allergen Reactions     Formaldehyde Rash       REVIEW OF SYSTEMS:  CONSTITUTIONAL:  NEGATIVE for fever, chills, change in weight  INTEGUMENTARY/SKIN:  NEGATIVE for worrisome rashes, moles or lesions  EYES:  NEGATIVE for vision changes or irritation  ENT/MOUTH:  NEGATIVE for ear, mouth and throat problems  RESP:  NEGATIVE for significant cough or SOB  BREAST:  NEGATIVE for masses, tenderness or discharge  CV:  NEGATIVE for chest pain, palpitations or peripheral edema  GI:  NEGATIVE for nausea, abdominal pain, heartburn, or change in bowel habits  :  Negative   MUSCULOSKELETAL:  See HPI above  NEURO:  NEGATIVE for weakness, dizziness or paresthesias  ENDOCRINE:  NEGATIVE for temperature intolerance, skin/hair changes  HEME/ALLERGY/IMMUNE:  NEGATIVE for bleeding problems  PSYCHIATRIC:  NEGATIVE for changes in mood or affect      PHYSICAL EXAM:  /78 (BP Location: Right arm, Patient Position: Chair, Cuff Size: Adult Regular)   Ht 1.524 m (5')   Wt 64 kg (141 lb)   BMI 27.54 kg/m     Body mass index is 27.54 kg/m .   GENERAL APPEARANCE: healthy, alert and no distress   HEENT: No apparent thyroid megaly. Clear sclera with normal ocular movement  RESPIRATORY: No labored breathing  SKIN: no suspicious lesions or rashes  NEURO: Normal strength and tone, mentation intact and speech normal  VASCULAR: Good pulses, and capillary refill   LYMPH: no lymphadenopathy   PSYCH:  mentation appears normal and affect normal/bright    MUSCULOSKELETAL:  Proximal end of the current splint is digging into her skin causing some  irritation  Splint was removed  Presence of mild ecchymosis in the fingers of fourth and fifth rays noted  Very slight swelling in the hand and the fingers, right compared to the left  No rotational deformity with a gentle flexion  Range of motion is limited  Localized tenderness at the base of fifth metacarpal  Skin is intact  Circulation is intact  Sensation is intact      ASSESSMENT:  Base of fifth metacarpal fracture with displacement  No evidence of dislocation at the CMC joint however    PLAN:  The images of the x-rays from July 7, 2019 were visualized.  Findings are thoroughly explained.  The nature of injury was explained.  Nonoperative treatment with immobilization for the next 4 to 5 weeks would be appropriate.  A remote possibility of surgical intervention was mentioned.  A new ulnar gutter splint will be placed for her to be off briefly for showers but otherwise protect the hand consistently.  All the questions were answered.  New x-rays when she returns to clinic.  Follow-up in 4 weeks.      Imaging Interpretation:     Recent Results (from the past 744 hour(s))   XR Hand Right G/E 3 Views    Narrative    RIGHT HAND THREE VIEWS   7/7/2019 5:12 PM     HISTORY: Hand injury, right, initial encounter.    COMPARISON: None.      Impression    IMPRESSION: There is an oblique mildly comminuted fracture of the  radial aspect of the base of the fifth metacarpal with displacement of  the fracture fragments by about 0.3 cm.     MD Piero CARRERO MD  Department of Orthopedic Surgery        Disclaimer: This note consists of symbols derived from keyboarding, dictation and/or voice recognition software. As a result, there may be errors in the script that have gone undetected. Please consider this when interpreting information found in this chart.      Again, thank you for allowing me to participate in the care of your patient.        Sincerely,        Piero Lam MD

## 2019-07-18 NOTE — TELEPHONE ENCOUNTER
Fidencio Ovalles-  Please see history and advise for next recommended follow up. Patient informs us that she is unable to complete the recommended Colposcopy at this time. Ok to repeat a co-test in in 2/2020?    Thank you  Lynette Nissen RN      History:  H/O LEEP (2000)  --  5/9/18 ASCUS, +HR HPV, not 16/18. Plan colp  8/6/18 [place] Bx: NICK 1 & 2. Plan LEEP  10/22/18 LEEP NICK 1, margins: neg, ECC: neg. Plan: Pap with co-testing 4 months after cervical healing, a colposcopy 8 months after cervical healing and Pap smears at 12, 18 and 24 months after healing of the LEEP incision.  If those are normal I told her that she could go back to annual exams with co-testing  02/18/19 Dx NIL pap, neg HR HPV. Plan colp in 4 months

## 2019-07-21 NOTE — TELEPHONE ENCOUNTER
This pt has had 2 LEEP procedures in the past.  Please ask her to at least have a f/u pap and co test this Fall 2019  Thanks  Hany Ovalles M.D.

## 2019-07-24 DIAGNOSIS — F43.23 ADJUSTMENT DISORDER WITH MIXED ANXIETY AND DEPRESSED MOOD: ICD-10-CM

## 2019-07-24 RX ORDER — ESCITALOPRAM OXALATE 20 MG/1
20 TABLET ORAL DAILY
Qty: 90 TABLET | Refills: 0 | Status: SHIPPED | OUTPATIENT
Start: 2019-07-24 | End: 2019-10-09

## 2019-07-24 NOTE — TELEPHONE ENCOUNTER
Medication is being filled for 1 time refill only due to:  due for appt.   Spoke with pt and she will call to schedule annual exam at the [facility].  They can repeat pap/HPV and refill meds at that time.  Shawanda Castro RN

## 2019-08-05 ENCOUNTER — ANCILLARY PROCEDURE (OUTPATIENT)
Dept: GENERAL RADIOLOGY | Facility: CLINIC | Age: 44
End: 2019-08-05
Attending: ORTHOPAEDIC SURGERY
Payer: COMMERCIAL

## 2019-08-05 ENCOUNTER — OFFICE VISIT (OUTPATIENT)
Dept: ORTHOPEDICS | Facility: CLINIC | Age: 44
End: 2019-08-05
Payer: COMMERCIAL

## 2019-08-05 VITALS
WEIGHT: 141 LBS | DIASTOLIC BLOOD PRESSURE: 76 MMHG | BODY MASS INDEX: 27.68 KG/M2 | SYSTOLIC BLOOD PRESSURE: 110 MMHG | HEIGHT: 60 IN

## 2019-08-05 DIAGNOSIS — S62.316A CLOSED DISPLACED FRACTURE OF BASE OF FIFTH METACARPAL BONE OF RIGHT HAND, INITIAL ENCOUNTER: Primary | ICD-10-CM

## 2019-08-05 DIAGNOSIS — S62.316A CLOSED DISPLACED FRACTURE OF BASE OF FIFTH METACARPAL BONE OF RIGHT HAND, INITIAL ENCOUNTER: ICD-10-CM

## 2019-08-05 PROCEDURE — 73130 X-RAY EXAM OF HAND: CPT | Mod: RT | Performed by: ORTHOPAEDIC SURGERY

## 2019-08-05 PROCEDURE — 99213 OFFICE O/P EST LOW 20 MIN: CPT | Performed by: ORTHOPAEDIC SURGERY

## 2019-08-05 ASSESSMENT — MIFFLIN-ST. JEOR: SCORE: 1206.07

## 2019-08-05 NOTE — LETTER
8/5/2019         RE: Sloane Aguilar  [id number]  [facility] [address]        Dear Colleague,    Thank you for referring your patient, Sloane Aguilar, to the [facility] ORTHOPEDIC SURGERY. Please see a copy of my visit note below.    HISTORY OF PRESENT ILLNESS:    Sloane Aguilar is a 43 year old female who is seen in follow up for right base of the 5th metacarpal fracture, patient is 4 weeks out from injury. DOI: 7/3/19.  Present symptoms: no pain in hand while wearing splint. Tenderness at base of the 5th MC outside of splint.  Patient has been compliant with use of ulnar gutter splint, and only removes for hygiene.  No new trauma since last office visit. No visible swelling, or bruising present.  Patient notes increased pain in the elbows due to modifications in activities.  She has been using cock up wrist splint, on the left for epicondylitis pain.  Patient has been using stylus pen to type using the right hand.    Treatments tried to this point: ulnar gutter splint. No recent use of OTC aids, or ice.  Past Medical History: Unchanged from the visit of 7/8/19. Please refer to that note.    REVIEW OF SYSTEMS:  CONSTITUTIONAL:  NEGATIVE for fever, chills, change in weight  INTEGUMENTARY/SKIN:  NEGATIVE for worrisome rashes, moles or lesions  EYES:  NEGATIVE for vision changes or irritation  ENT/MOUTH:  NEGATIVE for ear, mouth and throat problems  RESP:  NEGATIVE for significant cough or SOB  BREAST:  NEGATIVE for masses, tenderness or discharge  CV:  NEGATIVE for chest pain, palpitations or peripheral edema  GI:  NEGATIVE for nausea, abdominal pain, heartburn, or change in bowel habits  :  Negative   MUSCULOSKELETAL:  See HPI above  NEURO:  NEGATIVE for weakness, dizziness or paresthesias  ENDOCRINE:  NEGATIVE for temperature intolerance, skin/hair changes  HEME/ALLERGY/IMMUNE:  NEGATIVE for bleeding problems  PSYCHIATRIC:  NEGATIVE for changes in mood or affect    PHYSICAL EXAM:  /76  (BP Location: Right arm, Patient Position: Chair, Cuff Size: Adult Regular)   Ht 1.524 m (5')   Wt 64 kg (141 lb)   BMI 27.54 kg/m     There is no height or weight on file to calculate BMI.   GENERAL APPEARANCE: healthy, alert and no distress   SKIN: no suspicious lesions or rashes  NEURO: Normal strength and tone, mentation intact and speech normal  VASCULAR:  good pulses, and cappillary refill   LYMPH: no lymphadenopathy   PSYCH:  mentation appears normal and affect normal/bright    MSK:  Normal gait  Minimal swelling of the right hand compared to left  No prominence at the fracture site of fifth metacarpal, right  Slight stiffness but she has basically full range of motion in flexion and extension of the fingers  Minimal tenderness at the fracture site  No rotational deformity of the little finger  Skin is intact  Sensation is intact  Circulation is intact    IMAGING INTERPRETATION: 3 views of the right hand from today demonstrate base of fifth metacarpal fracture as noted previously on July 7, 2019.  The fracture gap is still well visible.  No significant callus formation is noted.  The alignment itself has not changed significantly.  No other findings.        ASSESSMENT:  Right fifth metacarpal fracture at the base, doing well clinically    PLAN:  The x-ray images from today were visualized.  These were compared to the previous x-rays.  Despite the lack of significant callus formation, clinically she is doing well.  Avoid any strenuous activities using the right hand but she can be out of the splint at this point.  They today changes in terms of her symptoms are expected.  On the other hand, if she has persisting pain or worsening progression, she should come back for follow-up.  Otherwise return to clinic as needed.  Gentle stretching exercises would be beneficial.           Piero Lam MD  Dept. Orthopedic Surgery  [facility]       Disclaimer: This note consists of symbols derived from  keyboarding, dictation and/or voice recognition software. As a result, there may be errors in the script that have gone undetected. Please consider this when interpreting information found in this chart.      Again, thank you for allowing me to participate in the care of your patient.        Sincerely,        Piero Lam MD

## 2019-08-05 NOTE — PROGRESS NOTES
HISTORY OF PRESENT ILLNESS:    Sloane Aguilar is a 43 year old female who is seen in follow up for right base of the 5th metacarpal fracture, patient is 4 weeks out from injury. DOI: 7/3/19.  Present symptoms: no pain in hand while wearing splint. Tenderness at base of the 5th MC outside of splint.  Patient has been compliant with use of ulnar gutter splint, and only removes for hygiene.  No new trauma since last office visit. No visible swelling, or bruising present.  Patient notes increased pain in the elbows due to modifications in activities.  She has been using cock up wrist splint, on the left for epicondylitis pain.  Patient has been using stylus pen to type using the right hand.    Treatments tried to this point: ulnar gutter splint. No recent use of OTC aids, or ice.  Past Medical History: Unchanged from the visit of 7/8/19. Please refer to that note.    REVIEW OF SYSTEMS:  CONSTITUTIONAL:  NEGATIVE for fever, chills, change in weight  INTEGUMENTARY/SKIN:  NEGATIVE for worrisome rashes, moles or lesions  EYES:  NEGATIVE for vision changes or irritation  ENT/MOUTH:  NEGATIVE for ear, mouth and throat problems  RESP:  NEGATIVE for significant cough or SOB  BREAST:  NEGATIVE for masses, tenderness or discharge  CV:  NEGATIVE for chest pain, palpitations or peripheral edema  GI:  NEGATIVE for nausea, abdominal pain, heartburn, or change in bowel habits  :  Negative   MUSCULOSKELETAL:  See HPI above  NEURO:  NEGATIVE for weakness, dizziness or paresthesias  ENDOCRINE:  NEGATIVE for temperature intolerance, skin/hair changes  HEME/ALLERGY/IMMUNE:  NEGATIVE for bleeding problems  PSYCHIATRIC:  NEGATIVE for changes in mood or affect    PHYSICAL EXAM:  /76 (BP Location: Right arm, Patient Position: Chair, Cuff Size: Adult Regular)   Ht 1.524 m (5')   Wt 64 kg (141 lb)   BMI 27.54 kg/m    There is no height or weight on file to calculate BMI.   GENERAL APPEARANCE: healthy, alert and no distress   SKIN: no  suspicious lesions or rashes  NEURO: Normal strength and tone, mentation intact and speech normal  VASCULAR:  good pulses, and cappillary refill   LYMPH: no lymphadenopathy   PSYCH:  mentation appears normal and affect normal/bright    MSK:  Normal gait  Minimal swelling of the right hand compared to left  No prominence at the fracture site of fifth metacarpal, right  Slight stiffness but she has basically full range of motion in flexion and extension of the fingers  Minimal tenderness at the fracture site  No rotational deformity of the little finger  Skin is intact  Sensation is intact  Circulation is intact    IMAGING INTERPRETATION: 3 views of the right hand from today demonstrate base of fifth metacarpal fracture as noted previously on July 7, 2019.  The fracture gap is still well visible.  No significant callus formation is noted.  The alignment itself has not changed significantly.  No other findings.        ASSESSMENT:  Right fifth metacarpal fracture at the base, doing well clinically    PLAN:  The x-ray images from today were visualized.  These were compared to the previous x-rays.  Despite the lack of significant callus formation, clinically she is doing well.  Avoid any strenuous activities using the right hand but she can be out of the splint at this point.  They today changes in terms of her symptoms are expected.  On the other hand, if she has persisting pain or worsening progression, she should come back for follow-up.  Otherwise return to clinic as needed.  Gentle stretching exercises would be beneficial.           Piero Lam MD  Dept. Orthopedic Surgery  [facility]       Disclaimer: This note consists of symbols derived from keyboarding, dictation and/or voice recognition software. As a result, there may be errors in the script that have gone undetected. Please consider this when interpreting information found in this chart.

## 2019-08-17 DIAGNOSIS — B00.9 HSV INFECTION: ICD-10-CM

## 2019-08-19 RX ORDER — VALACYCLOVIR HYDROCHLORIDE 500 MG/1
TABLET, FILM COATED ORAL
Qty: 30 TABLET | Refills: 0 | Status: SHIPPED | OUTPATIENT
Start: 2019-08-19 | End: 2019-10-10

## 2019-09-30 ENCOUNTER — HEALTH MAINTENANCE LETTER (OUTPATIENT)
Age: 44
End: 2019-09-30

## 2019-10-09 ENCOUNTER — OFFICE VISIT (OUTPATIENT)
Dept: FAMILY MEDICINE | Facility: CLINIC | Age: 44
End: 2019-10-09
Payer: COMMERCIAL

## 2019-10-09 VITALS
SYSTOLIC BLOOD PRESSURE: 104 MMHG | OXYGEN SATURATION: 98 % | HEIGHT: 60 IN | TEMPERATURE: 98.1 F | WEIGHT: 137 LBS | HEART RATE: 67 BPM | DIASTOLIC BLOOD PRESSURE: 76 MMHG | RESPIRATION RATE: 16 BRPM | BODY MASS INDEX: 26.9 KG/M2

## 2019-10-09 DIAGNOSIS — Z00.00 ROUTINE GENERAL MEDICAL EXAMINATION AT A HEALTH CARE FACILITY: Primary | ICD-10-CM

## 2019-10-09 DIAGNOSIS — F43.23 ADJUSTMENT DISORDER WITH MIXED ANXIETY AND DEPRESSED MOOD: ICD-10-CM

## 2019-10-09 DIAGNOSIS — R61 PERSPIRATION EXCESSIVE: ICD-10-CM

## 2019-10-09 DIAGNOSIS — Z12.31 ENCOUNTER FOR SCREENING MAMMOGRAM FOR BREAST CANCER: ICD-10-CM

## 2019-10-09 DIAGNOSIS — R87.810 CERVICAL HIGH RISK HPV (HUMAN PAPILLOMAVIRUS) TEST POSITIVE: ICD-10-CM

## 2019-10-09 DIAGNOSIS — Z23 NEED FOR INFLUENZA VACCINATION: ICD-10-CM

## 2019-10-09 DIAGNOSIS — N87.1 DYSPLASIA OF CERVIX, HIGH GRADE CIN 2: ICD-10-CM

## 2019-10-09 LAB
ERYTHROCYTE [DISTWIDTH] IN BLOOD BY AUTOMATED COUNT: 11.9 % (ref 10–15)
HCT VFR BLD AUTO: 40.7 % (ref 35–47)
HGB BLD-MCNC: 13.8 G/DL (ref 11.7–15.7)
MCH RBC QN AUTO: 33.6 PG (ref 26.5–33)
MCHC RBC AUTO-ENTMCNC: 33.9 G/DL (ref 31.5–36.5)
MCV RBC AUTO: 99 FL (ref 78–100)
PLATELET # BLD AUTO: 348 10E9/L (ref 150–450)
RBC # BLD AUTO: 4.11 10E12/L (ref 3.8–5.2)
WBC # BLD AUTO: 5.2 10E9/L (ref 4–11)

## 2019-10-09 PROCEDURE — G0123 SCREEN CERV/VAG THIN LAYER: HCPCS | Performed by: NURSE PRACTITIONER

## 2019-10-09 PROCEDURE — 80048 BASIC METABOLIC PNL TOTAL CA: CPT | Performed by: NURSE PRACTITIONER

## 2019-10-09 PROCEDURE — 85027 COMPLETE CBC AUTOMATED: CPT | Performed by: NURSE PRACTITIONER

## 2019-10-09 PROCEDURE — 84443 ASSAY THYROID STIM HORMONE: CPT | Performed by: NURSE PRACTITIONER

## 2019-10-09 PROCEDURE — 90471 IMMUNIZATION ADMIN: CPT | Performed by: NURSE PRACTITIONER

## 2019-10-09 PROCEDURE — 90686 IIV4 VACC NO PRSV 0.5 ML IM: CPT | Performed by: NURSE PRACTITIONER

## 2019-10-09 PROCEDURE — 36415 COLL VENOUS BLD VENIPUNCTURE: CPT | Performed by: NURSE PRACTITIONER

## 2019-10-09 PROCEDURE — 80061 LIPID PANEL: CPT | Performed by: NURSE PRACTITIONER

## 2019-10-09 PROCEDURE — 99396 PREV VISIT EST AGE 40-64: CPT | Mod: 25 | Performed by: NURSE PRACTITIONER

## 2019-10-09 PROCEDURE — 87624 HPV HI-RISK TYP POOLED RSLT: CPT | Performed by: NURSE PRACTITIONER

## 2019-10-09 RX ORDER — ESCITALOPRAM OXALATE 20 MG/1
20 TABLET ORAL DAILY
Qty: 90 TABLET | Refills: 1 | Status: SHIPPED | OUTPATIENT
Start: 2019-10-09 | End: 2020-11-04

## 2019-10-09 ASSESSMENT — ENCOUNTER SYMPTOMS
NERVOUS/ANXIOUS: 0
DIARRHEA: 0
HEMATOCHEZIA: 0
FEVER: 0
COUGH: 1
DIZZINESS: 0
CONSTIPATION: 0
CHILLS: 0
ABDOMINAL PAIN: 0
HEMATURIA: 0
EYE PAIN: 0
FREQUENCY: 0

## 2019-10-09 ASSESSMENT — ANXIETY QUESTIONNAIRES
IF YOU CHECKED OFF ANY PROBLEMS ON THIS QUESTIONNAIRE, HOW DIFFICULT HAVE THESE PROBLEMS MADE IT FOR YOU TO DO YOUR WORK, TAKE CARE OF THINGS AT HOME, OR GET ALONG WITH OTHER PEOPLE: NOT DIFFICULT AT ALL
GAD7 TOTAL SCORE: 0
3. WORRYING TOO MUCH ABOUT DIFFERENT THINGS: NOT AT ALL
2. NOT BEING ABLE TO STOP OR CONTROL WORRYING: NOT AT ALL
6. BECOMING EASILY ANNOYED OR IRRITABLE: NOT AT ALL
7. FEELING AFRAID AS IF SOMETHING AWFUL MIGHT HAPPEN: NOT AT ALL
5. BEING SO RESTLESS THAT IT IS HARD TO SIT STILL: NOT AT ALL
1. FEELING NERVOUS, ANXIOUS, OR ON EDGE: NOT AT ALL

## 2019-10-09 ASSESSMENT — MIFFLIN-ST. JEOR: SCORE: 1182.93

## 2019-10-09 ASSESSMENT — PATIENT HEALTH QUESTIONNAIRE - PHQ9: 5. POOR APPETITE OR OVEREATING: NOT AT ALL

## 2019-10-09 NOTE — LETTER
October 30, 2019      Sloane Aguilar  [id number]  [facility] [address]    Dear ,      This letter is in regards to the PAP smear and HPV (Human Papillomavirus) test you had done recently. Your PAP test result is normal, but your HPV (Human Papillomavirus) test was positive.     About 80 percent of women have been exposed to HPV virus throughout their lifetime. There is no medication for the treatment of HPV. Typically your own immune system gets rid of the virus before it does harm. HPV is spread by direct skin-to-skin contact, including sexual intercourse, oral sex, anal sex, or any other contact involving the genital area (example: hand to genital contact). It is not possible to become infected with HPV by touching an object, such as a toilet seat. Most people who are infected with HPV have no signs or symptoms.    Things that you can do to boost your immune system and help your body get rid of HPV: get plenty of rest, eat a well-balanced diet of healthy foods, stop smoking, exercise regularly and decrease stress.    Please return in 6 months to repeat your pap smear and HPV test.     If you have additional questions regarding this result, please call our registered nurse, Verito at [phone].    Sincerely,      JONAS Patel CNP//alice

## 2019-10-09 NOTE — PROGRESS NOTES
SUBJECTIVE:   CC: Sloane Aguilar is an 44 year old woman who presents for preventive health visit.     Healthy Habits:     Getting at least 3 servings of Calcium per day:  Yes    Bi-annual eye exam:  Yes    Dental care twice a year:  Yes    Sleep apnea or symptoms of sleep apnea:  None    Diet:  Low fat/cholesterol    Frequency of exercise:  2-3 days/week    Duration of exercise:  N/A    Taking medications regularly:  No    Barriers to taking medications:  None    Medication side effects:  None    PHQ-2 Total Score: 0    Additional concerns today:  No    Anxiety: well controlled on daily lexapro.  Due for refill today.  She has trazodone that she uses infrequently for sleep.  When she needs this only takes 1 tab.  Does not need a refill on her trazodone today.   TAL-7 SCORE 12/26/2017 5/9/2018 10/9/2019   Total Score - - -   Total Score 0 0 0     Hx of abnormal pap, ASCUS +HPV in 5/2018.  She was seen in gyn and had a LEEP and colp.  Was due for repeat colp, but she declined.  Due for repeat pap today.     Fasting for lab work today.     Today's PHQ-2 Score:   PHQ-2 ( 1999 Pf[employer]) 10/9/2019   Q1: Little interest or pleasure in doing things 0   Q2: Feeling down, depressed or hopeless 0   PHQ-2 Score 0   Q1: Little interest or pleasure in doing things Not at all   Q2: Feeling down, depressed or hopeless Not at all   PHQ-2 Score 0       Abuse: Current or Past(Physical, Sexual or Emotional)- No  Do you feel safe in your environment? Yes    Social History     Tobacco Use     Smoking status: Never Smoker     Smokeless tobacco: Never Used   Substance Use Topics     Alcohol use: Yes     Alcohol/week: 0.0 standard drinks     Comment: 2 wine several x weekly     If you drink alcohol do you typically have >3 drinks per day or >7 drinks per week? No    Alcohol Use 10/9/2019   Prescreen: >3 drinks/day or >7 drinks/week? No   Prescreen: >3 drinks/day or >7 drinks/week? -       Reviewed orders with patient.  Reviewed health  maintenance and updated orders accordingly - Yes  BP Readings from Last 3 Encounters:   10/09/19 104/76   08/05/19 110/76   07/08/19 120/78    Wt Readings from Last 3 Encounters:   10/09/19 62.1 kg (137 lb)   08/05/19 64 kg (141 lb)   07/08/19 64 kg (141 lb)                  Current Outpatient Medications   Medication Sig Dispense Refill     aluminum chloride (DRYSOL) 20 % external solution Use daily as directed 35 mL prn     escitalopram (LEXAPRO) 20 MG tablet Take 1 tablet (20 mg) by mouth daily 90 tablet 1     traMADol (ULTRAM) 50 MG tablet Take 1 tablet (50 mg) by mouth every 6 hours as needed for severe pain 12 tablet 0     traZODone (DESYREL) 50 MG tablet Take 1-2 tablets ( mg) by mouth nightly as needed for sleep 180 tablet 1     valACYclovir (VALTREX) 500 MG tablet TAKE 1 TABLET BY MOUTH EVERY DAY 30 tablet 0     clotrimazole-betamethasone (LOTRISONE) 1-0.05 % external cream Apply topically 2 times daily (Patient not taking: Reported on 10/9/2019) 30 g 1     escitalopram (LEXAPRO) 20 MG tablet Take 1 tablet (20 mg) by mouth daily DUE FOR APPOINTMENT MAY 2019. NO FURTHER REFILLS (Patient not taking: Reported on 10/9/2019) 30 tablet 0     Allergies   Allergen Reactions     Formaldehyde Rash       Mammogram Screening: Patient under age 50, mutual decision reflected in health maintenance.      Pertinent mammograms are reviewed under the imaging tab.  History of abnormal Pap smear: YES - updated in Problem List and Health Maintenance accordingly  PAP / HPV Latest Ref Rng & Units 2/18/2019 5/9/2018 8/18/2015   PAP - NIL ASC-US(A) NIL   HPV 16 DNA NEG:Negative Negative Negative Negative   HPV 18 DNA NEG:Negative Negative Negative Negative   OTHER HR HPV NEG:Negative Negative Positive(A) Negative     Reviewed and updated as needed this visit by clinical staff  Tobacco  Allergies  Meds  Med Hx  Surg Hx  Fam Hx  Soc Hx        Reviewed and updated as needed this visit by Provider        Past Medical  History:   Diagnosis Date     Abnormal cervical Papanicolaou smear 05/09/2018    see problem list     anxiety      Cervical high risk HPV (human papillomavirus) test positive 5/9/2018    See problem list      Past Surgical History:   Procedure Laterality Date     C APPENDECTOMY       HC TOOTH EXTRACTION W/FORCEP       HERNIA REPAIR, UMBILICAL  2007     LEEP TX, CERVICAL  2000, 2018    LEEP TX Cervical     TUBAL LIGATION  2007    ablation       Review of Systems   Constitutional: Negative for chills and fever.   HENT: Positive for congestion. Negative for ear pain.    Eyes: Negative for pain.   Respiratory: Positive for cough.    Cardiovascular: Negative for chest pain.   Gastrointestinal: Negative for abdominal pain, constipation, diarrhea and hematochezia.   Genitourinary: Negative for frequency and hematuria.   Neurological: Negative for dizziness.   Psychiatric/Behavioral: The patient is not nervous/anxious.           OBJECTIVE:   /76 (BP Location: Right arm, Patient Position: Sitting, Cuff Size: Adult Regular)   Pulse 67   Temp 98.1  F (36.7  C)   Resp 16   Ht 1.524 m (5')   Wt 62.1 kg (137 lb)   SpO2 98%   BMI 26.76 kg/m    Physical Exam  GENERAL: healthy, alert and no distress  EYES: Eyes grossly normal to inspection, PERRL and conjunctivae and sclerae normal  HENT: ear canals and TM's normal, nose and mouth without ulcers or lesions  NECK: no adenopathy, no asymmetry, masses, or scars and thyroid normal to palpation  RESP: lungs clear to auscultation - no rales, rhonchi or wheezes  BREAST: normal without masses, tenderness or nipple discharge and no palpable axillary masses or adenopathy  CV: regular rate and rhythm, normal S1 S2, no S3 or S4, no murmur, click or rub, no peripheral edema and peripheral pulses strong  ABDOMEN: soft, nontender, no hepatosplenomegaly, no masses and bowel sounds normal   (female): normal female external genitalia, normal urethral meatus, vaginal mucosa pink, moist,  well rugated, and normal cervix/adnexa/uterus without masses or discharge  MS: no gross musculoskeletal defects noted, no edema  SKIN: no suspicious lesions or rashes  NEURO: Normal strength and tone, mentation intact and speech normal  PSYCH: mentation appears normal, affect normal/bright    Diagnostic Test Results:  Labs reviewed in Epic    ASSESSMENT/PLAN:   1. Dysplasia of cervix, high grade NICK 2  Due for repeat pap today.   - Pap imaged thin layer screen with HPV - recommended age 30 - 65  - HPV High Risk Types DNA Cervical    2. Routine general medical examination at a health care facility  Normal exam; fasting for lab work.   - Basic metabolic panel  - TSH with free T4 reflex  - CBC with platelets  - Lipid panel reflex to direct LDL Fasting    3. Perspiration excessive  Refilled.   - aluminum chloride (DRYSOL) 20 % external solution; Use daily as directed  Dispense: 35 mL; Refill: prn    4. anxiety  Stable, refilled today.   - escitalopram (LEXAPRO) 20 MG tablet; Take 1 tablet (20 mg) by mouth daily  Dispense: 90 tablet; Refill: 1    5. Encounter for screening mammogram for breast cancer  - *MA Screening Digital Bilateral; Future    6. Need for influenza vaccination  Administered today.   - INFLUENZA VACCINE IM > 6 MONTHS VALENT IIV4 [35058]    7. Cervical high risk HPV (human papillomavirus) test positive  Due for repeat pap today.   - Pap imaged thin layer screen with HPV - recommended age 30 - 65  - HPV High Risk Types DNA Cervical    COUNSELING:  Reviewed preventive health counseling, as reflected in patient instructions       Regular exercise       Healthy diet/nutrition       Immunizations    Vaccinated for: Influenza          Estimated body mass index is 26.76 kg/m  as calculated from the following:    Height as of this encounter: 1.524 m (5').    Weight as of this encounter: 62.1 kg (137 lb).    Weight management plan: Discussed healthy diet and exercise guidelines     reports that she has never  smoked. She has never used smokeless tobacco.      Counseling Resources:  ATP IV Guidelines  Pooled Cohorts Equation Calculator  Breast Cancer Risk Calculator  FRAX Risk Assessment  ICSI Preventive Guidelines  Dietary Guidelines for Americans, 2010  USDA's MyPlate  ASA Prophylaxis  Lung CA Screening    [facility]

## 2019-10-10 DIAGNOSIS — B00.9 HSV INFECTION: ICD-10-CM

## 2019-10-10 LAB
ANION GAP SERPL CALCULATED.3IONS-SCNC: 11 MMOL/L (ref 3–14)
BUN SERPL-MCNC: 7 MG/DL (ref 7–30)
CALCIUM SERPL-MCNC: 8.7 MG/DL (ref 8.5–10.1)
CHLORIDE SERPL-SCNC: 102 MMOL/L (ref 94–109)
CHOLEST SERPL-MCNC: 238 MG/DL
CO2 SERPL-SCNC: 25 MMOL/L (ref 20–32)
CREAT SERPL-MCNC: 0.72 MG/DL (ref 0.52–1.04)
GFR SERPL CREATININE-BSD FRML MDRD: >90 ML/MIN/{1.73_M2}
GLUCOSE SERPL-MCNC: 74 MG/DL (ref 70–99)
HDLC SERPL-MCNC: 95 MG/DL
LDLC SERPL CALC-MCNC: 125 MG/DL
NONHDLC SERPL-MCNC: 143 MG/DL
POTASSIUM SERPL-SCNC: 4.1 MMOL/L (ref 3.4–5.3)
SODIUM SERPL-SCNC: 138 MMOL/L (ref 133–144)
TRIGL SERPL-MCNC: 88 MG/DL
TSH SERPL DL<=0.005 MIU/L-ACNC: 1.36 MU/L (ref 0.4–4)

## 2019-10-10 RX ORDER — VALACYCLOVIR HYDROCHLORIDE 500 MG/1
TABLET, FILM COATED ORAL
Qty: 30 TABLET | Refills: 3 | Status: SHIPPED | OUTPATIENT
Start: 2019-10-10 | End: 2020-02-28

## 2019-10-10 ASSESSMENT — ANXIETY QUESTIONNAIRES: GAD7 TOTAL SCORE: 0

## 2019-10-10 NOTE — TELEPHONE ENCOUNTER
"Requested Prescriptions   Pending Prescriptions Disp Refills     valACYclovir (VALTREX) 500 MG tablet [Pharmacy Med Name: VALACYCLOVIR 500MG TABLETS] 30 tablet 0     Sig: TAKE 1 TABLET BY MOUTH EVERY DAY       Antivirals for Herpes Protocol Passed - 10/10/2019  1:15 PM        Passed - Patient is age 12 or older        Passed - Recent (12 mo) or future (30 days) visit within the authorizing provider's specialty     Patient has had an office visit with the authorizing provider or a provider within the authorizing providers department within the previous 12 mos or has a future within next 30 days. See \"Patient Info\" tab in inbasket, or \"Choose Columns\" in Meds & Orders section of the refill encounter.              Passed - Medication is active on med list        Passed - Normal serum creatinine on file in past 12 months     Recent Labs   Lab Test 10/09/19  0952   CR 0.72             rx approved per [facility] protocol.        Cara Drake RN    "

## 2019-10-14 ENCOUNTER — ANCILLARY PROCEDURE (OUTPATIENT)
Dept: MAMMOGRAPHY | Facility: CLINIC | Age: 44
End: 2019-10-14
Attending: NURSE PRACTITIONER
Payer: COMMERCIAL

## 2019-10-14 DIAGNOSIS — Z12.31 ENCOUNTER FOR SCREENING MAMMOGRAM FOR BREAST CANCER: ICD-10-CM

## 2019-10-14 LAB
COPATH REPORT: NORMAL
PAP: NORMAL

## 2019-10-14 PROCEDURE — 77067 SCR MAMMO BI INCL CAD: CPT | Mod: TC

## 2019-10-16 NOTE — RESULT ENCOUNTER NOTE
Please asked the patient to repeat a Pap and cotesting in 6 months.  If that is normal I would ask her to repeat a Pap and co-test annually  Thank you  Hany Ovalles MD FACOG

## 2019-12-03 ENCOUNTER — OFFICE VISIT (OUTPATIENT)
Dept: URGENT CARE | Facility: URGENT CARE | Age: 44
End: 2019-12-03
Payer: COMMERCIAL

## 2019-12-03 VITALS
BODY MASS INDEX: 26.76 KG/M2 | SYSTOLIC BLOOD PRESSURE: 110 MMHG | HEART RATE: 78 BPM | DIASTOLIC BLOOD PRESSURE: 74 MMHG | TEMPERATURE: 98.3 F | RESPIRATION RATE: 20 BRPM | OXYGEN SATURATION: 99 % | WEIGHT: 137 LBS

## 2019-12-03 DIAGNOSIS — J04.0 LARYNGITIS: ICD-10-CM

## 2019-12-03 DIAGNOSIS — J06.9 UPPER RESPIRATORY TRACT INFECTION, UNSPECIFIED TYPE: Primary | ICD-10-CM

## 2019-12-03 PROCEDURE — 99213 OFFICE O/P EST LOW 20 MIN: CPT | Performed by: PHYSICIAN ASSISTANT

## 2019-12-03 NOTE — PROGRESS NOTES
SUBJECTIVE:  Sloane Aguilar is a 44 year old female who presents to the clinic today with a chief complaint of cough  for 6 day(s).  Her cough is described as productive of yellow sputum.    The patient's symptoms are moderate and improving.  Associated symptoms include fever, nasal congestion, rhinorrhea and ear pain. The patient's symptoms are exacerbated by lying down  Patient has been using nothing  to improve symptoms.    Past Medical History:   Diagnosis Date     Abnormal cervical Papanicolaou smear 05/09/2018    see problem list     anxiety      Cervical high risk HPV (human papillomavirus) test positive 5/9/2018    See problem list       Current Outpatient Medications   Medication Sig Dispense Refill     escitalopram (LEXAPRO) 20 MG tablet Take 1 tablet (20 mg) by mouth daily 90 tablet 1     traMADol (ULTRAM) 50 MG tablet Take 1 tablet (50 mg) by mouth every 6 hours as needed for severe pain 12 tablet 0     traZODone (DESYREL) 50 MG tablet Take 1-2 tablets ( mg) by mouth nightly as needed for sleep 180 tablet 1     valACYclovir (VALTREX) 500 MG tablet TAKE 1 TABLET BY MOUTH EVERY DAY 30 tablet 3     aluminum chloride (DRYSOL) 20 % external solution Use daily as directed 35 mL prn     clotrimazole-betamethasone (LOTRISONE) 1-0.05 % external cream Apply topically 2 times daily (Patient not taking: Reported on 10/9/2019) 30 g 1     escitalopram (LEXAPRO) 20 MG tablet Take 1 tablet (20 mg) by mouth daily DUE FOR APPOINTMENT MAY 2019. NO FURTHER REFILLS (Patient not taking: Reported on 10/9/2019) 30 tablet 0       Social History     Tobacco Use     Smoking status: Never Smoker     Smokeless tobacco: Never Used   Substance Use Topics     Alcohol use: Yes     Alcohol/week: 0.0 standard drinks     Comment: 2 wine several x weekly       ROS  10 point ROS negative except as listed above      OBJECTIVE:  /74   Pulse 78   Temp 98.3  F (36.8  C) (Tympanic)   Resp 20   Wt 62.1 kg (137 lb)   SpO2 99%    BMI 26.76 kg/m    GENERAL APPEARANCE: healthy, alert and no distress  EYES: EOMI,  PERRL, conjunctiva clear  HENT: ear canals and TM's normal.  Nose and mouth without ulcers, erythema or lesions  NECK: supple, nontender, no lymphadenopathy  RESP: lungs clear to auscultation - no rales, rhonchi or wheezes  CV: regular rates and rhythm, normal S1 S2, no murmur noted  NEURO: Normal strength and tone, sensory exam grossly normal,  normal speech and mentation  SKIN: no suspicious lesions or rashes    ASSESSMENT:  (J06.9) Upper respiratory tract infection, unspecified type  (primary encounter diagnosis)  (J04.0) Laryngitis  Comment: no evidence of bacteria  Plan: supportive measures      Monitor and follow up

## 2019-12-03 NOTE — LETTER
[facility]  [address]  [address]  [place] [facility] [address]  Phone: [phone]  Fax: [phone]    December 3, 2019        Sloane Aguilar  [address]  [facility] [address]          To whom it may concern:    RE: Sloane Aguilar    Patient was seen and treated today at our clinic.  Please excuse absences 12/2 - 12/5/19.      Please contact me for questions or concerns.      Sincerely,        Mauro Johnson PA-C

## 2019-12-12 ENCOUNTER — OFFICE VISIT (OUTPATIENT)
Dept: FAMILY MEDICINE | Facility: CLINIC | Age: 44
End: 2019-12-12
Payer: COMMERCIAL

## 2019-12-12 VITALS
TEMPERATURE: 97.9 F | HEART RATE: 79 BPM | OXYGEN SATURATION: 100 % | BODY MASS INDEX: 27.15 KG/M2 | DIASTOLIC BLOOD PRESSURE: 64 MMHG | SYSTOLIC BLOOD PRESSURE: 115 MMHG | RESPIRATION RATE: 14 BRPM | WEIGHT: 139 LBS

## 2019-12-12 DIAGNOSIS — J06.9 VIRAL URI WITH COUGH: ICD-10-CM

## 2019-12-12 DIAGNOSIS — J04.0 LARYNGITIS: ICD-10-CM

## 2019-12-12 DIAGNOSIS — R07.0 THROAT PAIN: Primary | ICD-10-CM

## 2019-12-12 LAB
DEPRECATED S PYO AG THROAT QL EIA: NORMAL
SPECIMEN SOURCE: NORMAL

## 2019-12-12 PROCEDURE — 99213 OFFICE O/P EST LOW 20 MIN: CPT | Performed by: PHYSICIAN ASSISTANT

## 2019-12-12 PROCEDURE — 87081 CULTURE SCREEN ONLY: CPT | Performed by: PHYSICIAN ASSISTANT

## 2019-12-12 PROCEDURE — 87880 STREP A ASSAY W/OPTIC: CPT | Performed by: PHYSICIAN ASSISTANT

## 2019-12-12 RX ORDER — METHYLPREDNISOLONE 4 MG
TABLET, DOSE PACK ORAL
Qty: 21 TABLET | Refills: 0 | Status: SHIPPED | OUTPATIENT
Start: 2019-12-12 | End: 2020-07-01

## 2019-12-12 NOTE — PROGRESS NOTES
Subjective     Sloane Aguilar is a 44 year old female who presents to clinic today for the following health issues:    HPI   Acute Illness   Acute illness concerns: cough, unable to speak  Onset: 2 weeks    Fever: no    Chills/Sweats: no    Headache (location?): no    Sinus Pressure: no    Conjunctivitis:  no    Ear Pain: no only plugged    Rhinorrhea: YES    Congestion: YES    Sore Throat: YES     Cough: YES- mostly dry    Wheeze: no    Decreased Appetite: no    Nausea: no    Vomiting: no    Diarrhea:  no    Dysuria/Freq.: no    Fatigue/Achiness: YES    Sick/Strep Exposure: YES     Therapies Tried and outcome: OTC cough med, advil        Patient Active Problem List   Diagnosis     Anxiety     CARDIOVASCULAR SCREENING; LDL GOAL LESS THAN 160     Cocaine dependence in remission (H)     Elevated fasting glucose     Dysplasia of cervix, high grade NICK 2     Cervical high risk HPV (human papillomavirus) test positive     Past Surgical History:   Procedure Laterality Date     C APPENDECTOMY       HC TOOTH EXTRACTION W/FORCEP       HERNIA REPAIR, UMBILICAL  2007     LEEP TX, CERVICAL  2000, 2018    LEEP TX Cervical     TUBAL LIGATION  2007    ablation       Social History     Tobacco Use     Smoking status: Never Smoker     Smokeless tobacco: Never Used   Substance Use Topics     Alcohol use: Yes     Alcohol/week: 0.0 standard drinks     Comment: 2 wine several x weekly     Family History   Problem Relation Age of Onset     Hypertension Mother      Cancer Mother         uterine dx 2012     Heart Disease Mother      Heart Disease Father      Heart Disease Brother 48        stint         Current Outpatient Medications   Medication Sig Dispense Refill     aluminum chloride (DRYSOL) 20 % external solution Use daily as directed 35 mL prn     clotrimazole-betamethasone (LOTRISONE) 1-0.05 % external cream Apply topically 2 times daily 30 g 1     escitalopram (LEXAPRO) 20 MG tablet Take 1 tablet (20 mg) by mouth daily 90 tablet  1     methylPREDNISolone (MEDROL DOSEPAK) 4 MG tablet therapy pack Follow Package Directions 21 tablet 0     traZODone (DESYREL) 50 MG tablet Take 1-2 tablets ( mg) by mouth nightly as needed for sleep 180 tablet 1     valACYclovir (VALTREX) 500 MG tablet TAKE 1 TABLET BY MOUTH EVERY DAY 30 tablet 3     Allergies   Allergen Reactions     Formaldehyde Rash         Reviewed and updated as needed this visit by Provider         Review of Systems   ROS COMP: Constitutional, HEENT, cardiovascular, pulmonary, gi and gu systems are negative, except as otherwise noted.        Objective    /64 (BP Location: Right arm, Patient Position: Chair, Cuff Size: Adult Regular)   Pulse 79   Temp 97.9  F (36.6  C) (Oral)   Resp 14   Wt 63 kg (139 lb)   SpO2 100%   BMI 27.15 kg/m    Body mass index is 27.15 kg/m .       Physical Exam   GENERAL: healthy, alert and no distress  EYES: Eyes grossly normal to inspection, PERRL and conjunctivae and sclerae normal  HENT: ear canals and TM's normal, nose and mouth without ulcers or lesions  RESP: lungs clear to auscultation - no rales, rhonchi or wheezes  CV: regular rate and rhythm, normal S1 S2, no S3 or S4, no murmur, click or rub, no peripheral edema and peripheral pulses strong  MS: no gross musculoskeletal defects noted, no edema  SKIN: no suspicious lesions or rashes  NEURO: Normal strength and tone, mentation intact and speech normal  PSYCH: mentation appears normal, affect normal/bright  LYMPH: no cervical, supraclavicular, axillary, or inguinal adenopathy    Diagnostic Test Results:  Results for orders placed or performed in visit on 12/12/19 (from the past 24 hour(s))   Rapid strep screen   Result Value Ref Range    Specimen Description Throat     Rapid Strep A Screen       NEGATIVE: No Group A streptococcal antigen detected by immunoassay, await culture report.           Assessment & Plan     (R07.0) Throat pain  (primary encounter diagnosis)    Comment: Rapid strep  is negative. Await culture. Continue supportive cares.    Plan: Rapid strep screen, Beta strep group A culture            (J04.0) Laryngitis    Comment: Try treating with steroid since there is likely inflammation of vocal cords.     Plan: methylPREDNISolone (MEDROL DOSEPAK) 4 MG tablet        therapy pack            (J06.9,  B97.89) Viral URI with cough    Comment: Likely still viral. Try steroid. If not improving after finishing steroid, will try antibiotic.    Plan: methylPREDNISolone (MEDROL DOSEPAK) 4 MG tablet        therapy pack                 Patient Instructions   Take the complete course of the steroid.     Call if not improving after finishing the steroid and we can try an antibiotic.        You can continue to take Tylenol or ibuprofen for pain and/or fever.    Use Cepacol drops or chloraseptic spray for sore throat.    You can gargle with warm salt water.     We will notify you if the strep culture is positive.    If not improving as expected, follow-up with primary care provider or sooner if worsening.         No follow-ups on file.    Brendon Mann PA-C  [facility]

## 2019-12-12 NOTE — PATIENT INSTRUCTIONS
Take the complete course of the steroid.     Call if not improving after finishing the steroid and we can try an antibiotic.        You can continue to take Tylenol or ibuprofen for pain and/or fever.    Use Cepacol drops or chloraseptic spray for sore throat.    You can gargle with warm salt water.     We will notify you if the strep culture is positive.    If not improving as expected, follow-up with primary care provider or sooner if worsening.

## 2019-12-13 LAB
BACTERIA SPEC CULT: NORMAL
SPECIMEN SOURCE: NORMAL

## 2020-01-17 ENCOUNTER — OFFICE VISIT (OUTPATIENT)
Dept: OBGYN | Facility: CLINIC | Age: 45
End: 2020-01-17
Payer: COMMERCIAL

## 2020-01-17 VITALS — WEIGHT: 143 LBS | SYSTOLIC BLOOD PRESSURE: 108 MMHG | DIASTOLIC BLOOD PRESSURE: 62 MMHG | BODY MASS INDEX: 27.93 KG/M2

## 2020-01-17 DIAGNOSIS — Z11.3 SCREEN FOR STD (SEXUALLY TRANSMITTED DISEASE): Primary | ICD-10-CM

## 2020-01-17 DIAGNOSIS — N76.0 VAGINITIS AND VULVOVAGINITIS: ICD-10-CM

## 2020-01-17 LAB
ALBUMIN UR-MCNC: NEGATIVE MG/DL
APPEARANCE UR: CLEAR
BILIRUB UR QL STRIP: NEGATIVE
COLOR UR AUTO: YELLOW
GLUCOSE UR STRIP-MCNC: NEGATIVE MG/DL
HGB UR QL STRIP: NEGATIVE
KETONES UR STRIP-MCNC: NEGATIVE MG/DL
LEUKOCYTE ESTERASE UR QL STRIP: NEGATIVE
NITRATE UR QL: NEGATIVE
PH UR STRIP: 7 PH (ref 5–7)
SOURCE: NORMAL
SP GR UR STRIP: 1.01 (ref 1–1.03)
SPECIMEN SOURCE: ABNORMAL
UROBILINOGEN UR STRIP-ACNC: 0.2 EU/DL (ref 0.2–1)
WET PREP SPEC: ABNORMAL

## 2020-01-17 PROCEDURE — 36415 COLL VENOUS BLD VENIPUNCTURE: CPT | Performed by: OBSTETRICS & GYNECOLOGY

## 2020-01-17 PROCEDURE — 87591 N.GONORRHOEAE DNA AMP PROB: CPT | Performed by: OBSTETRICS & GYNECOLOGY

## 2020-01-17 PROCEDURE — 81003 URINALYSIS AUTO W/O SCOPE: CPT | Performed by: OBSTETRICS & GYNECOLOGY

## 2020-01-17 PROCEDURE — 86780 TREPONEMA PALLIDUM: CPT | Performed by: OBSTETRICS & GYNECOLOGY

## 2020-01-17 PROCEDURE — 87340 HEPATITIS B SURFACE AG IA: CPT | Performed by: OBSTETRICS & GYNECOLOGY

## 2020-01-17 PROCEDURE — 99213 OFFICE O/P EST LOW 20 MIN: CPT | Performed by: OBSTETRICS & GYNECOLOGY

## 2020-01-17 PROCEDURE — 87210 SMEAR WET MOUNT SALINE/INK: CPT | Performed by: OBSTETRICS & GYNECOLOGY

## 2020-01-17 PROCEDURE — 87389 HIV-1 AG W/HIV-1&-2 AB AG IA: CPT | Performed by: OBSTETRICS & GYNECOLOGY

## 2020-01-17 PROCEDURE — 87491 CHLMYD TRACH DNA AMP PROBE: CPT | Performed by: OBSTETRICS & GYNECOLOGY

## 2020-01-17 RX ORDER — FLUCONAZOLE 150 MG/1
150 TABLET ORAL DAILY
Qty: 3 TABLET | Refills: 0 | Status: SHIPPED | OUTPATIENT
Start: 2020-01-17 | End: 2020-07-01

## 2020-01-17 NOTE — PATIENT INSTRUCTIONS
You can reach your [facility] Care Team any time of the day by calling [phone]. This number will put you in touch with the 24 hour nurse line if the clinic is closed.    To contact your OB/GYN Station Coordinator/Surgery Scheduler please call [phone]. This is a direct number for your care team between 8 a.m. and 4 p.m. Monday through Friday.    [facility] is open for your convenience:  Monday through Friday 8 a.m. to 6 p.m.  Closed weekends and all major holidays.

## 2020-01-17 NOTE — NURSING NOTE
Chief Complaint   Patient presents with     Vaginal Problem       Initial /62   Wt 64.9 kg (143 lb)   BMI 27.93 kg/m   Estimated body mass index is 27.93 kg/m  as calculated from the following:    Height as of 10/9/19: 1.524 m (5').    Weight as of this encounter: 64.9 kg (143 lb).  BP completed using cuff size: regular    Questioned patient about current smoking habits.  Pt. has never smoked.          The following HM Due: NONE      The following patient reported/Care Every where data was sent to:  P ABSTRACT QUALITY INITIATIVES [[id number]]        Iesha Garcia, [facility]

## 2020-01-17 NOTE — PROGRESS NOTES
HPI:  Sloane Aguilar is a 44 year old female  No LMP recorded. Patient has had an ablation. TL contraception, who presents for evaluation of intermittent vaginal burning itching irritation without discharge and intermittent mild dysuria without fevers chills back pain or other related symptoms.  She is in a monogamous relationship with the same individual for the last 6 months.  Patient states that she and her partner use condoms.  She is also requesting STD check today.  No known exposure.  No recent antibiotic or other known precipitating event..    Past Medical History:   Diagnosis Date     Abnormal cervical Papanicolaou smear 2018    see problem list     anxiety      Cervical high risk HPV (human papillomavirus) test positive 2018    See problem list     Past Surgical History:   Procedure Laterality Date     C APPENDECTOMY       HC TOOTH EXTRACTION W/FORCEP       HERNIA REPAIR, UMBILICAL       LEEP TX, CERVICAL  ,     LEEP TX Cervical     TUBAL LIGATION  2007    ablation     Family History   Problem Relation Age of Onset     Hypertension Mother      Cancer Mother         uterine dx      Heart Disease Mother      Heart Disease Father      Heart Disease Brother 48        stint     Social History     Socioeconomic History     Marital status:      Spouse name: Not on file     Number of children: Not on file     Years of education: Not on file     Highest education level: Not on file   Occupational History     Not on file   Social Needs     Financial resource strain: Not on file     Food insecurity:     Worry: Not on file     Inability: Not on file     Transportation needs:     Medical: Not on file     Non-medical: Not on file   Tobacco Use     Smoking status: Never Smoker     Smokeless tobacco: Never Used   Substance and Sexual Activity     Alcohol use: Yes     Alcohol/week: 0.0 standard drinks     Comment: 2 wine several x weekly     Drug use: No     Sexual activity: Yes      Partners: Male     Birth control/protection: Surgical, Female Surgical     Comment: TL   Lifestyle     Physical activity:     Days per week: Not on file     Minutes per session: Not on file     Stress: Not on file   Relationships     Social connections:     Talks on phone: Not on file     Gets together: Not on file     Attends Mu-ism service: Not on file     Active member of club or organization: Not on file     Attends meetings of clubs or organizations: Not on file     Relationship status: Not on file     Intimate partner violence:     Fear of current or ex partner: Not on file     Emotionally abused: Not on file     Physically abused: Not on file     Forced sexual activity: Not on file   Other Topics Concern     Parent/sibling w/ CABG, MI or angioplasty before 65F 55M? Yes     Comment: brother stint at 48   Social History Narrative     Not on file       Allergies:  Formaldehyde    Current Outpatient Medications   Medication Sig Dispense Refill     clotrimazole-betamethasone (LOTRISONE) 1-0.05 % external cream Apply topically 2 times daily 30 g 1     escitalopram (LEXAPRO) 20 MG tablet Take 1 tablet (20 mg) by mouth daily 90 tablet 1     fluconazole (DIFLUCAN) 150 MG tablet Take 1 tablet (150 mg) by mouth daily for 3 days 3 tablet 0     valACYclovir (VALTREX) 500 MG tablet TAKE 1 TABLET BY MOUTH EVERY DAY 30 tablet 3     aluminum chloride (DRYSOL) 20 % external solution Use daily as directed 35 mL prn     methylPREDNISolone (MEDROL DOSEPAK) 4 MG tablet therapy pack Follow Package Directions 21 tablet 0     traZODone (DESYREL) 50 MG tablet Take 1-2 tablets ( mg) by mouth nightly as needed for sleep (Patient not taking: Reported on 1/17/2020) 180 tablet 1       Review Of Systems   ROS: 10 point ROS neg other than the symptoms noted above in the HPI.    Exam:  /62   Wt 64.9 kg (143 lb)   BMI 27.93 kg/m    {Constitutional: healthy, alert and mild distress  Genitourinary: Normal external genitalia  without lesions and speculum multiparous appearing cervix copious vaginal discharge with cheesy type curds wet prep was done.  DNA probe for GC chlamydia done wet prep was positive for yeast, bimanual exam uterus is parous mobile adnexa without masses or enlargement.  No adnexal tenderness        Assessment/Plan:  (B19.20) Hepatitis C  (primary encounter diagnosis)  Comment: Blood test done at patient request  Plan: Done    (Z11.3) Screen for STD (sexually transmitted disease)  Comment: Testing done wet prep positive for yeast  Plan: WET PREP, NEISSERIA GONORRHOEA PCR, HEPATITIS B        SURFACE ANTIGEN, HIV Antigen Antibody Combo,         Treponema Abs w Reflex to RPR and Titer,         CHLAMYDIA TRACHOMATIS PCR, UA reflex to         Microscopic and Culture        I gave her prescription for Diflucan 150 mg daily for 3 days.  She is also been a use of Monistat 1 at bedtime tonight she will call me if no resolution of her symptoms    (N76.0) Vaginitis and vulvovaginitis  Comment: As above  Plan: fluconazole (DIFLUCAN) 150 MG tablet        As above      Hany Ovalles M.D.

## 2020-01-18 LAB
HBV SURFACE AG SERPL QL IA: NONREACTIVE
HIV 1+2 AB+HIV1 P24 AG SERPL QL IA: NONREACTIVE
T PALLIDUM AB SER QL: NONREACTIVE

## 2020-01-18 NOTE — RESULT ENCOUNTER NOTE
Sloane, all your results are within normal limits other than the wet prep being positive for yeast as we talked about earlier.  Please let me know if you have any additional questions  Hany Ovalles M.D.

## 2020-01-19 LAB
C TRACH DNA SPEC QL NAA+PROBE: NEGATIVE
N GONORRHOEA DNA SPEC QL NAA+PROBE: NEGATIVE
SPECIMEN SOURCE: NORMAL
SPECIMEN SOURCE: NORMAL

## 2020-01-20 NOTE — RESULT ENCOUNTER NOTE
Sloane,  your gonorrhea and Chlamydia culture results are negative.  Please let me know if you have any questions or your symptoms have not resolved  Hany Ovalles M.D.

## 2020-02-11 ENCOUNTER — TELEPHONE (OUTPATIENT)
Dept: FAMILY MEDICINE | Facility: CLINIC | Age: 45
End: 2020-02-11

## 2020-02-11 NOTE — TELEPHONE ENCOUNTER
She could certainly try over the counter products such as Flonase or Mucinex. If she feels that she needs more than over the counter treatment, I would recommend an E-visit since it has been so long since she was seen.    Brendon Mann PA-C on 2/11/2020 at 9:58 AM

## 2020-02-11 NOTE — TELEPHONE ENCOUNTER
Pt was seen for sore throat and hoarseness 2 months ago  Given steroids which helped some but did not return to 100%    Voice still cracks, nasal congestion, draining down throat, no cough, no fever    CB  Telephone Information:   Mobile [phone]     Pt wondering if symptoms need to be treated again  Pt has very high deductible so can't afford OV    Treat with OTC products? Ok for e-visit?

## 2020-02-11 NOTE — TELEPHONE ENCOUNTER
Called pt, agrees to do E visit with AB, discussed options below, assisted on how to do E visit, informs [place] on maternity leave, ok for am with AB, routed FYI to AB to watch for E visit tomorrow    Tamiko Hernández RN, BSN  Message handled by Nurse Triage.

## 2020-02-28 DIAGNOSIS — B00.9 HSV INFECTION: ICD-10-CM

## 2020-02-28 RX ORDER — VALACYCLOVIR HYDROCHLORIDE 500 MG/1
TABLET, FILM COATED ORAL
Qty: 30 TABLET | Refills: 3 | Status: SHIPPED | OUTPATIENT
Start: 2020-02-28 | End: 2020-08-17

## 2020-02-28 NOTE — TELEPHONE ENCOUNTER
Prescription approved per [facility] Refill Protocol.  Jennifer JOHNS RN

## 2020-05-19 NOTE — PATIENT INSTRUCTIONS
Keep the splint dry, and don't remove until you see orthopedics tomorrow.    Call tomorrow for appointment at your own orthopedics office or call the [facility] number.       Security called to get patient belongings

## 2020-07-01 ENCOUNTER — OFFICE VISIT (OUTPATIENT)
Dept: FAMILY MEDICINE | Facility: CLINIC | Age: 45
End: 2020-07-01
Payer: COMMERCIAL

## 2020-07-01 VITALS
WEIGHT: 138 LBS | BODY MASS INDEX: 26.95 KG/M2 | OXYGEN SATURATION: 99 % | SYSTOLIC BLOOD PRESSURE: 110 MMHG | RESPIRATION RATE: 12 BRPM | HEART RATE: 80 BPM | DIASTOLIC BLOOD PRESSURE: 78 MMHG | TEMPERATURE: 99.1 F

## 2020-07-01 DIAGNOSIS — N30.00 ACUTE CYSTITIS WITHOUT HEMATURIA: Primary | ICD-10-CM

## 2020-07-01 LAB
ALBUMIN UR-MCNC: NEGATIVE MG/DL
APPEARANCE UR: ABNORMAL
BACTERIA #/AREA URNS HPF: ABNORMAL /HPF
BILIRUB UR QL STRIP: NEGATIVE
COLOR UR AUTO: YELLOW
GLUCOSE UR STRIP-MCNC: NEGATIVE MG/DL
HGB UR QL STRIP: ABNORMAL
KETONES UR STRIP-MCNC: NEGATIVE MG/DL
LEUKOCYTE ESTERASE UR QL STRIP: ABNORMAL
NITRATE UR QL: NEGATIVE
NON-SQ EPI CELLS #/AREA URNS LPF: ABNORMAL /LPF
PH UR STRIP: 6 PH (ref 5–7)
RBC #/AREA URNS AUTO: ABNORMAL /HPF
SOURCE: ABNORMAL
SP GR UR STRIP: 1.02 (ref 1–1.03)
TRANS CELLS #/AREA URNS HPF: ABNORMAL /HPF
UROBILINOGEN UR STRIP-ACNC: 0.2 EU/DL (ref 0.2–1)
WBC #/AREA URNS AUTO: ABNORMAL /HPF

## 2020-07-01 PROCEDURE — 81001 URINALYSIS AUTO W/SCOPE: CPT | Performed by: PHYSICIAN ASSISTANT

## 2020-07-01 PROCEDURE — 99213 OFFICE O/P EST LOW 20 MIN: CPT | Performed by: PHYSICIAN ASSISTANT

## 2020-07-01 RX ORDER — NITROFURANTOIN 25; 75 MG/1; MG/1
100 CAPSULE ORAL 2 TIMES DAILY
Qty: 10 CAPSULE | Refills: 0 | Status: SHIPPED | OUTPATIENT
Start: 2020-07-01 | End: 2020-07-06

## 2020-07-01 RX ORDER — FLUCONAZOLE 150 MG/1
150 TABLET ORAL ONCE
Qty: 2 TABLET | Refills: 0 | Status: SHIPPED | OUTPATIENT
Start: 2020-07-01 | End: 2020-07-05

## 2020-07-01 NOTE — PROGRESS NOTES
Subjective     Sloane Aguilar is a 44 year old female who presents to clinic today for the following health issues:    HPI   URINARY TRACT SYMPTOMS  Onset: 3 days    Description:   Painful urination (Dysuria): YES           Frequency: YES  Blood in urine (Hematuria): no   Delay in urine (Hesitency): no     Intensity: mild    Progression of Symptoms:  improving    Accompanying Signs & Symptoms:  Fever/chills: no   Flank pain no   Nausea and vomiting: no   Any vaginal symptoms: none  Abdominal/Pelvic Pain: no     History:   History of frequent UTI's: no   History of kidney stones: YES- has passed 1 couple years ago  Sexually Active: YES  Possibility of pregnancy: No    Precipitating factors:   none    Therapies Tried and outcome: Cranberry juice prn (contraindicated in Coumadin patients), increased water, AZO which have helped      Patient Active Problem List   Diagnosis     Anxiety     CARDIOVASCULAR SCREENING; LDL GOAL LESS THAN 160     Cocaine dependence in remission (H)     Elevated fasting glucose     Dysplasia of cervix, high grade NICK 2     Cervical high risk HPV (human papillomavirus) test positive     Past Surgical History:   Procedure Laterality Date     C APPENDECTOMY       HC TOOTH EXTRACTION W/FORCEP       HERNIA REPAIR, UMBILICAL  2007     LEEP TX, CERVICAL  2000, 2018    LEEP TX Cervical     TUBAL LIGATION  2007    ablation       Social History     Tobacco Use     Smoking status: Never Smoker     Smokeless tobacco: Never Used   Substance Use Topics     Alcohol use: Yes     Alcohol/week: 0.0 standard drinks     Comment: 2 wine several x weekly     Family History   Problem Relation Age of Onset     Hypertension Mother      Cancer Mother         uterine dx 2012     Heart Disease Mother      Heart Disease Father      Heart Disease Brother 48        stint         Current Outpatient Medications   Medication Sig Dispense Refill     aluminum chloride (DRYSOL) 20 % external solution Use daily as directed 35 mL  prn     clotrimazole-betamethasone (LOTRISONE) 1-0.05 % external cream Apply topically 2 times daily 30 g 1     escitalopram (LEXAPRO) 20 MG tablet Take 1 tablet (20 mg) by mouth daily 90 tablet 1     fluconazole (DIFLUCAN) 150 MG tablet Take 1 tablet (150 mg) by mouth once for 1 dose 2 tablet 0     nitroFURantoin macrocrystal-monohydrate (MACROBID) 100 MG capsule Take 1 capsule (100 mg) by mouth 2 times daily for 5 days 10 capsule 0     valACYclovir (VALTREX) 500 MG tablet TAKE 1 TABLET BY MOUTH EVERY DAY 30 tablet 3     traZODone (DESYREL) 50 MG tablet Take 1-2 tablets ( mg) by mouth nightly as needed for sleep (Patient not taking: Reported on 1/17/2020) 180 tablet 1     Allergies   Allergen Reactions     Formaldehyde Rash         Reviewed and updated as needed this visit by Provider         Review of Systems   Constitutional, HEENT, cardiovascular, pulmonary, gi and gu systems are negative, except as otherwise noted.        Objective    /78 (BP Location: Left arm, Patient Position: Chair, Cuff Size: Adult Regular)   Pulse 80   Temp 99.1  F (37.3  C) (Oral)   Resp 12   Wt 62.6 kg (138 lb)   SpO2 99%   BMI 26.95 kg/m    Body mass index is 26.95 kg/m .         Physical Exam   GENERAL: healthy, alert and no distress  EYES: Eyes grossly normal to inspection, PERRL and conjunctivae and sclerae normal  RESP: lungs clear to auscultation - no rales, rhonchi or wheezes  CV: regular rate and rhythm, normal S1 S2, no S3 or S4, no murmur, click or rub, no peripheral edema and peripheral pulses strong  ABDOMEN: soft, nontender, no hepatosplenomegaly, no masses and bowel sounds normal  MS: no gross musculoskeletal defects noted, no edema  SKIN: no suspicious lesions or rashes  NEURO: Normal strength and tone, mentation intact and speech normal  BACK: no CVA tenderness  PSYCH: mentation appears normal, affect normal/bright    Diagnostic Test Results:  Results for orders placed or performed in visit on 07/01/20  (from the past 24 hour(s))   UA with Microscopic reflex to Culture    Specimen: Midstream Urine   Result Value Ref Range    Color Urine Yellow     Appearance Urine Cloudy     Glucose Urine Negative NEG^Negative mg/dL    Bilirubin Urine Negative NEG^Negative    Ketones Urine Negative NEG^Negative mg/dL    Specific Gravity Urine 1.020 1.003 - 1.035    pH Urine 6.0 5.0 - 7.0 pH    Protein Albumin Urine Negative NEG^Negative mg/dL    Urobilinogen Urine 0.2 0.2 - 1.0 EU/dL    Nitrite Urine Negative NEG^Negative    Blood Urine Trace (A) NEG^Negative    Leukocyte Esterase Urine Small (A) NEG^Negative    Source Midstream Urine     WBC Urine 5-10 (A) OTO5^0 - 5 /HPF    RBC Urine O - 2 OTO2^O - 2 /HPF    Squamous Epithelial /LPF Urine Moderate (A) FEW^Few /LPF    Transitional Epi Few FEW^Few /HPF    Bacteria Urine Many (A) NEG^Negative /HPF           Assessment & Plan     (N30.00) Acute cystitis without hematuria  (primary encounter diagnosis)    Comment: Treat with macrobid. Monitor temperature and symptoms. If fever develops, patient will call as this is a sign of pyelonephritis. Diflucan given for yeast infection after taking antibiotics.    Plan: UA with Microscopic reflex to Culture,         nitroFURantoin macrocrystal-monohydrate         (MACROBID) 100 MG capsule, fluconazole         (DIFLUCAN) 150 MG tablet                There are no Patient Instructions on file for this visit.    No follow-ups on file.    Brendon Mann PA-C  [facility]

## 2020-07-01 NOTE — PATIENT INSTRUCTIONS
"  Patient Education     Bladder Infection, Female (Adult)    Urine is normally doesn't have any bacteria in it. But bacteria can get into the urinary tract from the skin around the rectum. Or they can travel in the blood from elsewhere in the body. Once they are in your urinary tract, they can cause infection in the urethra (urethritis), the bladder (cystitis), or the kidneys (pyelonephritis).  The most common place for an infection is in the bladder. This is called a bladder infection. This is one of the most common infections in women. Most bladder infections are easily treated. They are not serious unless the infection spreads to the kidney.  The phrases \"bladder infection,\" \"UTI,\" and \"cystitis\" are often used to describe the same thing. But they are not always the same. Cystitis is an inflammation of the bladder. The most common cause of cystitis is an infection.  Symptoms  The infection causes inflammation in the urethra and bladder. This causes many of the symptoms. The most common symptoms of a bladder infection are:    Pain or burning when urinating    Having to urinate more often than usual    Urgent need to urinate    Only a small amount of urine comes out    Blood in urine    Abdominal discomfort. This is usually in the lower abdomen above the pubic bone.    Cloudy urine    Strong- or bad-smelling urine    Unable to urinate (urinary retention)    Unable to hold urine in (urinary incontinence)    Fever    Loss of appetite    Confusion (in older adults)  Causes  Bladder infections are not contagious. You can't get one from someone else, from a toilet seat, or from sharing a bath.  The most common cause of bladder infections is bacteria from the bowels. The bacteria get onto the skin around the opening of the urethra. From there, they can get into the urine and travel up to the bladder, causing inflammation and infection. This usually happens because of:    Wiping improperly after urinating. Always wipe " from front to back.    Bowel incontinence    Pregnancy    Procedures such as having a catheter inserted    Older age    Not emptying your bladder. This can allow bacteria a chance to grow in your urine.    Dehydration    Constipation    Sex    Use of a diaphragm for birth control   Treatment  Bladder infections are diagnosed by a urine test. They are treated with antibiotics and usually clear up quickly without complications. Treatment helps prevent a more serious kidney infection.  Medicines  Medicines can help in the treatment of a bladder infection:    Take antibiotics until they are used up, even if you feel better. It is important to finish them to make sure the infection has cleared.    You can use acetaminophen or ibuprofen for pain, fever, or discomfort, unless another medicine was prescribed. If you have chronic liver or kidney disease, talk with your healthcare provider before using these medicines. Also talk with your provider if you've ever had a stomach ulcer or gastrointestinal bleeding, or are taking blood-thinner medicines.    If you are given phenazopydridine to reduce burning with urination, it will cause your urine to become a bright orange color. This can stain clothing.  Care and prevention  These self-care steps can help prevent future infections:    Drink plenty of fluids to prevent dehydration and flush out your bladder. Do this unless you must restrict fluids for other health reasons, or your doctor told you not to.    Proper cleaning after going to the bathroom is important. Wipe from front to back after using the toilet to prevent the spread of bacteria.    Urinate more often. Don't try to hold urine in for a long time.    Wear loose-fitting clothes and cotton underwear. Avoid tight-fitting pants.    Improve your diet and prevent constipation. Eat more fresh fruit and vegetables, and fiber, and less junk and fatty foods.    Avoid sex until your symptoms are gone.    Avoid caffeine,  alcohol, and spicy foods. These can irritate your bladder.    Urinate right after intercourse to flush out your bladder.    If you use birth control pills and have frequent bladder infections, discuss it with your doctor.  Follow-up care  Call your healthcare provider if all symptoms are not gone after 3 days of treatment. This is especially important if you have repeat infections.  If a culture was done, you will be told if your treatment needs to be changed. If directed, you can call to find out the results.  If X-rays were done, you will be told if the results will affect your treatment.  Call 911  Call 911 if any of the following occur:    Trouble breathing    Hard to wake up or confusion    Fainting or loss of consciousness    Rapid heart rate  When to seek medical advice  Call your healthcare provider right away if any of these occur:    Fever of 100.4 F (38.0 C) or higher, or as directed by your healthcare provider    Symptoms are not better by the third day of treatment    Back or belly (abdominal) pain that gets worse    Repeated vomiting, or unable to keep medicine down    Weakness or dizziness    Vaginal discharge    Pain, redness, or swelling in the outer vaginal area (labia)  Date Last Reviewed: 10/1/2016    8443-8291 [employer]. [address] [place] [address], [place] [address]. All rights reserved. This information is not intended as a substitute for professional medical care. Always follow your healthcare professional's instructions.

## 2020-07-05 ENCOUNTER — OFFICE VISIT (OUTPATIENT)
Dept: URGENT CARE | Facility: URGENT CARE | Age: 45
End: 2020-07-05
Payer: COMMERCIAL

## 2020-07-05 VITALS
HEART RATE: 86 BPM | BODY MASS INDEX: 27.42 KG/M2 | OXYGEN SATURATION: 97 % | DIASTOLIC BLOOD PRESSURE: 80 MMHG | TEMPERATURE: 101.9 F | WEIGHT: 140.4 LBS | SYSTOLIC BLOOD PRESSURE: 120 MMHG

## 2020-07-05 DIAGNOSIS — R50.9 FEVER IN ADULT: ICD-10-CM

## 2020-07-05 DIAGNOSIS — M54.50 ACUTE BILATERAL LOW BACK PAIN WITHOUT SCIATICA: ICD-10-CM

## 2020-07-05 DIAGNOSIS — R10.13 ABDOMINAL PAIN, EPIGASTRIC: ICD-10-CM

## 2020-07-05 DIAGNOSIS — R30.0 DYSURIA: Primary | ICD-10-CM

## 2020-07-05 LAB
ALBUMIN SERPL-MCNC: 3.7 G/DL (ref 3.4–5)
ALBUMIN UR-MCNC: NEGATIVE MG/DL
ALP SERPL-CCNC: 62 U/L (ref 40–150)
ALT SERPL W P-5'-P-CCNC: 39 U/L (ref 0–50)
ANION GAP SERPL CALCULATED.3IONS-SCNC: 10 MMOL/L (ref 3–14)
APPEARANCE UR: CLEAR
AST SERPL W P-5'-P-CCNC: 50 U/L (ref 0–45)
BASOPHILS # BLD AUTO: 0.1 10E9/L (ref 0–0.2)
BASOPHILS NFR BLD AUTO: 1.3 %
BILIRUB SERPL-MCNC: 0.5 MG/DL (ref 0.2–1.3)
BILIRUB UR QL STRIP: NEGATIVE
BUN SERPL-MCNC: 7 MG/DL (ref 7–30)
CALCIUM SERPL-MCNC: 9.6 MG/DL (ref 8.5–10.1)
CHLORIDE SERPL-SCNC: 97 MMOL/L (ref 94–109)
CO2 SERPL-SCNC: 27 MMOL/L (ref 20–32)
COLOR UR AUTO: YELLOW
CREAT SERPL-MCNC: 1.4 MG/DL (ref 0.52–1.04)
DIFFERENTIAL METHOD BLD: ABNORMAL
EOSINOPHIL # BLD AUTO: 0.3 10E9/L (ref 0–0.7)
EOSINOPHIL NFR BLD AUTO: 5.1 %
ERYTHROCYTE [DISTWIDTH] IN BLOOD BY AUTOMATED COUNT: 12.1 % (ref 10–15)
ERYTHROCYTE [SEDIMENTATION RATE] IN BLOOD BY WESTERGREN METHOD: 25 MM/H (ref 0–20)
GFR SERPL CREATININE-BSD FRML MDRD: 44 ML/MIN/{1.73_M2}
GLUCOSE SERPL-MCNC: 113 MG/DL (ref 70–99)
GLUCOSE UR STRIP-MCNC: NEGATIVE MG/DL
HCT VFR BLD AUTO: 39.3 % (ref 35–47)
HGB BLD-MCNC: 13.4 G/DL (ref 11.7–15.7)
HGB UR QL STRIP: NEGATIVE
KETONES UR STRIP-MCNC: NEGATIVE MG/DL
LEUKOCYTE ESTERASE UR QL STRIP: NEGATIVE
LYMPHOCYTES # BLD AUTO: 0.7 10E9/L (ref 0.8–5.3)
LYMPHOCYTES NFR BLD AUTO: 13 %
MCH RBC QN AUTO: 33.3 PG (ref 26.5–33)
MCHC RBC AUTO-ENTMCNC: 34.1 G/DL (ref 31.5–36.5)
MCV RBC AUTO: 98 FL (ref 78–100)
MONOCYTES # BLD AUTO: 0.6 10E9/L (ref 0–1.3)
MONOCYTES NFR BLD AUTO: 11.6 %
NEUTROPHILS # BLD AUTO: 3.8 10E9/L (ref 1.6–8.3)
NEUTROPHILS NFR BLD AUTO: 69 %
NITRATE UR QL: NEGATIVE
PH UR STRIP: 7 PH (ref 5–7)
PLATELET # BLD AUTO: 266 10E9/L (ref 150–450)
POTASSIUM SERPL-SCNC: 3.5 MMOL/L (ref 3.4–5.3)
PROT SERPL-MCNC: 6.8 G/DL (ref 6.8–8.8)
RBC # BLD AUTO: 4.03 10E12/L (ref 3.8–5.2)
SODIUM SERPL-SCNC: 134 MMOL/L (ref 133–144)
SOURCE: NORMAL
SP GR UR STRIP: 1.01 (ref 1–1.03)
UROBILINOGEN UR STRIP-ACNC: 0.2 EU/DL (ref 0.2–1)
WBC # BLD AUTO: 5.5 10E9/L (ref 4–11)

## 2020-07-05 PROCEDURE — 86140 C-REACTIVE PROTEIN: CPT | Performed by: FAMILY MEDICINE

## 2020-07-05 PROCEDURE — 36415 COLL VENOUS BLD VENIPUNCTURE: CPT | Performed by: FAMILY MEDICINE

## 2020-07-05 PROCEDURE — 85652 RBC SED RATE AUTOMATED: CPT | Performed by: FAMILY MEDICINE

## 2020-07-05 PROCEDURE — 81003 URINALYSIS AUTO W/O SCOPE: CPT | Performed by: PHYSICIAN ASSISTANT

## 2020-07-05 PROCEDURE — 83690 ASSAY OF LIPASE: CPT | Performed by: FAMILY MEDICINE

## 2020-07-05 PROCEDURE — 99214 OFFICE O/P EST MOD 30 MIN: CPT | Performed by: FAMILY MEDICINE

## 2020-07-05 PROCEDURE — 85025 COMPLETE CBC W/AUTO DIFF WBC: CPT | Performed by: FAMILY MEDICINE

## 2020-07-05 PROCEDURE — 82150 ASSAY OF AMYLASE: CPT | Performed by: FAMILY MEDICINE

## 2020-07-05 PROCEDURE — 80053 COMPREHEN METABOLIC PANEL: CPT | Performed by: FAMILY MEDICINE

## 2020-07-05 NOTE — LETTER
[facility]  [address]  [address]  [place] [id number] [phone]      July 5, 2020    RE:  Sloane Aguilar                                                                                                                                                       [address]  [place] [facility] [place] [address]            To whom it may concern:    Sloane Aguilar is under my professional care at the [facility] on July 5, 2020.  Because of her current fever, I recommend that Sloane Aguilar be absent from work on July 6, 2020.        She  may return to work once all of the following criteria have been met:  1) No fevers for 72 hours.  2) Her other symptoms have improved.  3) At least 10 days have passed since the start of the symptoms.      She will undergo COVID-19 testing soon.          Sincerely,        Maximus Garcia MD    [facility]

## 2020-07-05 NOTE — PROGRESS NOTES
SUBJECTIVE:   Sloane Aguilar is a 44 year old female presenting with a chief complaint of gradual-onset bilateral low back pain since today.  She has been feeling feverish since yesterday.  No recent lifting injuries/trauma to the back.    In addition, patient started experiencing fevers up to 101.9 F today.    Course of illness is still present. .    Current and Associated symptoms: as listed above.   Treatment measures tried include Azo and Advil and Macrobid.  .  Predisposing factors include none.    No shortness of breath  No cough  No loss of smell/taste  She started having  Headaches (pressure) at the christi of the head.   No muscle aches  No fatigue  No chest pain  No sore throat  No vomiting  No diarrhea  No sick contacts with fevers  No recent exposure to persons positive for COVID-19  .    Past Medical History:   Diagnosis Date     Abnormal cervical Papanicolaou smear 05/09/2018    see problem list     anxiety      Cervical high risk HPV (human papillomavirus) test positive 5/9/2018    See problem list     Current Outpatient Medications   Medication Sig Dispense Refill     aluminum chloride (DRYSOL) 20 % external solution Use daily as directed 35 mL prn     clotrimazole-betamethasone (LOTRISONE) 1-0.05 % external cream Apply topically 2 times daily 30 g 1     escitalopram (LEXAPRO) 20 MG tablet Take 1 tablet (20 mg) by mouth daily 90 tablet 1     nitroFURantoin macrocrystal-monohydrate (MACROBID) 100 MG capsule Take 1 capsule (100 mg) by mouth 2 times daily for 5 days 10 capsule 0     traZODone (DESYREL) 50 MG tablet Take 1-2 tablets ( mg) by mouth nightly as needed for sleep (Patient taking differently: Take  mg by mouth as needed for sleep ) 180 tablet 1     valACYclovir (VALTREX) 500 MG tablet TAKE 1 TABLET BY MOUTH EVERY DAY 30 tablet 3     Social History     Tobacco Use     Smoking status: Never Smoker     Smokeless tobacco: Never Used   Substance Use Topics     Alcohol use: Yes      Alcohol/week: 0.0 standard drinks     Comment: 2 wine several x weekly       ROS:  CONSTITUTIONAL:POSITIVE  for fevers.    INTEGUMENTARY/SKIN: NEGATIVE for worrisome rashes, moles or lesions  ENT/MOUTH: no sore throat. No loss of smell/taste.    RESP:no cough/shortness of breath.    CV: no chest pain.   GI:  Positive for some pain at the upper abdomen and RLQ.    :  No hematuria.  No urinary symptoms.   MUSCULOSKELETAL: positive for bilateral low back pain.     OBJECTIVE:  Temp 101.9  F (38.8  C) (Tympanic)   Wt 63.7 kg (140 lb 6.4 oz)   BMI 27.42 kg/m     BP:  120/80.  Pulse:  86.  Oxygen Sat:  97% on room air.     GENERAL APPEARANCE: healthy, alert and no distress.  No acute respiratory distress.    EYES: Eyes grossly normal to inspection  NECK: supple, nontender, no lymphadenopathy  RESP: lungs clear to auscultation - no rales, rhonchi or wheezes  CV: regular rates and rhythm, normal S1 S2, no murmur noted  ABDOMEN: soft, normal bowel sounds, tenderness over the epigastrium, RLQ.  No rebound/guarding.  No distension.    BACK:  No pain with percussion over the costovertebral angle regions.    SKIN: no suspicious lesions or rashes.  No rashes.  No pallor.  No cyanosis.      LAB:    Results for orders placed or performed in visit on 07/05/20   *UA reflex to Microscopic and Culture ([place] and [facility] (except [place] and [place])     Status: None    Specimen: Midstream Urine   Result Value Ref Range    Color Urine Yellow     Appearance Urine Clear     Glucose Urine Negative NEG^Negative mg/dL    Bilirubin Urine Negative NEG^Negative    Ketones Urine Negative NEG^Negative mg/dL    Specific Gravity Urine 1.010 1.003 - 1.035    Blood Urine Negative NEG^Negative    pH Urine 7.0 5.0 - 7.0 pH    Protein Albumin Urine Negative NEG^Negative mg/dL    Urobilinogen Urine 0.2 0.2 - 1.0 EU/dL    Nitrite Urine Negative NEG^Negative    Leukocyte Esterase Urine Negative NEG^Negative    Source Midstream Urine     Comprehensive metabolic panel     Status: Abnormal   Result Value Ref Range    Sodium 134 133 - 144 mmol/L    Potassium 3.5 3.4 - 5.3 mmol/L    Chloride 97 94 - 109 mmol/L    Carbon Dioxide 27 20 - 32 mmol/L    Anion Gap 10 3 - 14 mmol/L    Glucose 113 (H) 70 - 99 mg/dL    Urea Nitrogen 7 7 - 30 mg/dL    Creatinine 1.40 (H) 0.52 - 1.04 mg/dL    GFR Estimate 44 (L) >60 mL/min/[1.73_m2]    GFR Estimate If Black 51 (L) >60 mL/min/[1.73_m2]    Calcium 9.6 8.5 - 10.1 mg/dL    Bilirubin Total 0.5 0.2 - 1.3 mg/dL    Albumin 3.7 3.4 - 5.0 g/dL    Protein Total 6.8 6.8 - 8.8 g/dL    Alkaline Phosphatase 62 40 - 150 U/L    ALT 39 0 - 50 U/L    AST 50 (H) 0 - 45 U/L   CBC with platelets and differential     Status: Abnormal   Result Value Ref Range    WBC 5.5 4.0 - 11.0 10e9/L    RBC Count 4.03 3.8 - 5.2 10e12/L    Hemoglobin 13.4 11.7 - 15.7 g/dL    Hematocrit 39.3 35.0 - 47.0 %    MCV 98 78 - 100 fl    MCH 33.3 (H) 26.5 - 33.0 pg    MCHC 34.1 31.5 - 36.5 g/dL    RDW 12.1 10.0 - 15.0 %    Platelet Count 266 150 - 450 10e9/L    Diff Method Automated Method     % Neutrophils 69.0 %    % Lymphocytes 13.0 %    % Monocytes 11.6 %    % Eosinophils 5.1 %    % Basophils 1.3 %    Absolute Neutrophil 3.8 1.6 - 8.3 10e9/L    Absolute Lymphocytes 0.7 (L) 0.8 - 5.3 10e9/L    Absolute Monocytes 0.6 0.0 - 1.3 10e9/L    Absolute Eosinophils 0.3 0.0 - 0.7 10e9/L    Absolute Basophils 0.1 0.0 - 0.2 10e9/L   ESR: Erythrocyte sedimentation rate     Status: Abnormal   Result Value Ref Range    Sed Rate 25 (H) 0 - 20 mm/h         ASSESSMENT:  Bilateral Low Back Pain  Fever  Patient's Sed Rate is elevated, indicating that inflammation and/or infection could be a cause of the fevers.  The WBC and differential are mostly within normal limits except for Absolute lymphocyte count slightly decreased.  The GFR and the Creatinine have mildly worsened compared to their corresponding values 9 months ago.  I cannot rule out COVID-19 as a cause.    Patient  can continue the full course of the Macrobid, as it has resolved the patient's recent acute cystitis.  The lack of hematuria on the UA makes kidney/ureteral stones a less likely cause of the low back pain.       PLAN:  Pending labs:  C-reactive Protein (CRP), Lipase, Amylase  If the CRP is very elevated, if the amylase and/or lipase values are very elevated, patient should go to the emergency room for further evaluation.    Also go to the emergency room if the fevers fail to improve in 48 hours and/or if the pain keeps worsening.    I ordered COVID-19 testing to be performed at the [place] location.     Continue measuring your body temperature twice a day.  If the temperatures are still 100.4 F or higher after 48 hours, go to the emergency room for further evaluation.  Go sooner to the emergency room if your other symptoms worsen.      Patient's renal function tests have worsened compared to similar tests 9 months ago.  I recommended that the patient follow up with her primary care provider for further evaluation of her kidneys.      Maximus Garcia MD

## 2020-07-06 LAB
AMYLASE SERPL-CCNC: 37 U/L (ref 30–110)
CRP SERPL-MCNC: 54 MG/L (ref 0–8)
LIPASE SERPL-CCNC: 153 U/L (ref 73–393)

## 2020-07-06 NOTE — PATIENT INSTRUCTIONS
I recommend that you undergo COVID-19 Testing.  If you don't hear from the , call [phone] to arrange a testing appointment.      Continue measuring your body temperature twice a day.  If the temperatures are still 100.4 F or higher after 48 hours, go to the emergency room for further evaluation.  Go sooner to the emergency room if your other symptoms worsen.      follow up with your primary care provider regarding the abnormal kidney test results from today.     Go to the emergency room if the pain keeps worsening.

## 2020-07-08 DIAGNOSIS — R50.9 FEVER IN ADULT: ICD-10-CM

## 2020-07-08 DIAGNOSIS — R10.13 ABDOMINAL PAIN, EPIGASTRIC: ICD-10-CM

## 2020-07-08 DIAGNOSIS — M54.50 ACUTE BILATERAL LOW BACK PAIN WITHOUT SCIATICA: ICD-10-CM

## 2020-07-08 PROCEDURE — U0003 INFECTIOUS AGENT DETECTION BY NUCLEIC ACID (DNA OR RNA); SEVERE ACUTE RESPIRATORY SYNDROME CORONAVIRUS 2 (SARS-COV-2) (CORONAVIRUS DISEASE [COVID-19]), AMPLIFIED PROBE TECHNIQUE, MAKING USE OF HIGH THROUGHPUT TECHNOLOGIES AS DESCRIBED BY CMS-2020-01-R: HCPCS | Performed by: FAMILY MEDICINE

## 2020-07-08 NOTE — LETTER
July 13, 2020        Sloane LANDRUM Lauren  [id number]  [facility] [address]    This letter provides a written record that you were tested for COVID-19 on 7/8/20.       Your result was negative. This means that we didn t find the virus that causes COVID-19 in your sample. A test may show negative when you do actually have the virus. This can happen when the virus is in the early stages of infection, before you feel illness symptoms.    If you have symptoms   Stay home and away from others (self-isolate) until you meet ALL of the guidelines below:    You ve had no fever--and no medicine that reduces fever--for 3 full days (72 hours). And      Your other symptoms have gotten better. For example, your cough or breathing has improved. And     At least 10 days have passed since your symptoms started.    During this time:    Stay home. Don t go to work, school or anywhere else.     Stay in your own room, including for meals. Use your own bathroom if you can.    Stay away from others in your home. No hugging, kissing or shaking hands. No visitors.    Clean  high touch  surfaces often (doorknobs, counters, handles, etc.). Use a household cleaning spray or wipes. You can find a full list on the EPA website at www.epa.gov/pesticide-registration/list-n-disinfectants-use-against-sars-cov-2.    Cover your mouth and nose with a mask, tissue or washcloth to avoid spreading germs.    Wash your hands and face often with soap and water.    Going back to work  Check with your employer for any guidelines to follow for going back to work.    Employers: This document serves as formal notice that your employee tested negative for COVID-19, as of the testing date shown above.

## 2020-07-09 LAB
SARS-COV-2 RNA SPEC QL NAA+PROBE: NOT DETECTED
SPECIMEN SOURCE: NORMAL

## 2020-07-10 ENCOUNTER — TELEPHONE (OUTPATIENT)
Dept: URGENT CARE | Facility: URGENT CARE | Age: 45
End: 2020-07-10

## 2020-07-10 NOTE — TELEPHONE ENCOUNTER
SUBJECTIVE:  The July 8, 2020, COVID-19 test was negative.    PLAN:  Please notify patient of this negative test result.     Maximus Garcia MD

## 2020-07-13 ENCOUNTER — VIRTUAL VISIT (OUTPATIENT)
Dept: INTERNAL MEDICINE | Facility: CLINIC | Age: 45
End: 2020-07-13
Payer: COMMERCIAL

## 2020-07-13 DIAGNOSIS — N30.00 ACUTE CYSTITIS WITHOUT HEMATURIA: Primary | ICD-10-CM

## 2020-07-13 PROCEDURE — 99213 OFFICE O/P EST LOW 20 MIN: CPT | Mod: 95 | Performed by: NURSE PRACTITIONER

## 2020-07-13 NOTE — PATIENT INSTRUCTIONS
Acute cystitis without hematuria  - treated on 7/1 with Nitrofurantoin and states doing better  - fever is gone and ready to go back to work 7/14/2020 (see work letter)  - reviewed labs with patient and recommended being checked to see if return to normal with next physical in 3 months.  - fluids.  - Covid test negative

## 2020-07-13 NOTE — LETTER
July 13, 2020      Sloane LANDRUM Lauren  [address]  [facility] [id number]              To Whom It May Concern:    Sloane had a virtual visit today; and she can return to work with no restrictions on 7/14/2020.  Please excuse from work from 7/6 to 7/13/2020.          Sincerely,      Effie Mix CNP

## 2020-07-13 NOTE — PROGRESS NOTES
"Sloane Aguilar is a 44 year old female who is being evaluated via a billable telephone visit.      The patient has been notified of following:     \"This telephone visit will be conducted via a call between you and your physician/provider. We have found that certain health care needs can be provided without the need for a physical exam.  This service lets us provide the care you need with a short phone conversation.  If a prescription is necessary we can send it directly to your pharmacy.  If lab work is needed we can place an order for that and you can then stop by our lab to have the test done at a later time.    Telephone visits are billed at different rates depending on your insurance coverage. During this emergency period, for some insurers they may be billed the same as an in-person visit.  Please reach out to your insurance provider with any questions.    If during the course of the call the physician/provider feels a telephone visit is not appropriate, you will not be charged for this service.\"    Patient has given verbal consent for Telephone visit?  Yes    What phone number would you like to be contacted at? [phone]    How would you like to obtain your AVS? Duarte Solitario     Sloane Aguilar is a 44 year old female who presents via phone visit today for the following health issues:    Needs a return to work letter and would like to discuss lab results.    HPI    See above. Was evaluated in  on 7/5/2020 for abdominal pain and prior on 7/1 for UTI and treated with Nitrofuronaton. On 7/5 had labs done for fever and belly pain.  Labs essentially ok except for elevated sed rate and CRP.  Glucose 113 H and Cr 1.40 H.  States she is feeling much better and wants to return to work on 7/14/2020,     Patient Active Problem List   Diagnosis     Anxiety     CARDIOVASCULAR SCREENING; LDL GOAL LESS THAN 160     Cocaine dependence in remission (H)     Elevated fasting glucose     Dysplasia of cervix, high " grade NICK 2     Cervical high risk HPV (human papillomavirus) test positive     Past Surgical History:   Procedure Laterality Date     C APPENDECTOMY       HC TOOTH EXTRACTION W/FORCEP       HERNIA REPAIR, UMBILICAL  2007     LEEP TX, CERVICAL  2000, 2018    LEEP TX Cervical     TUBAL LIGATION  2007    ablation       Social History     Tobacco Use     Smoking status: Never Smoker     Smokeless tobacco: Never Used   Substance Use Topics     Alcohol use: Yes     Alcohol/week: 0.0 standard drinks     Comment: 2 wine several x weekly     Family History   Problem Relation Age of Onset     Hypertension Mother      Cancer Mother         uterine dx 2012     Heart Disease Mother      Heart Disease Father      Heart Disease Brother 48        stint         Current Outpatient Medications   Medication Sig Dispense Refill     aluminum chloride (DRYSOL) 20 % external solution Use daily as directed 35 mL prn     clotrimazole-betamethasone (LOTRISONE) 1-0.05 % external cream Apply topically 2 times daily 30 g 1     escitalopram (LEXAPRO) 20 MG tablet Take 1 tablet (20 mg) by mouth daily 90 tablet 1     traZODone (DESYREL) 50 MG tablet Take 1-2 tablets ( mg) by mouth nightly as needed for sleep (Patient taking differently: Take  mg by mouth as needed for sleep ) 180 tablet 1     valACYclovir (VALTREX) 500 MG tablet TAKE 1 TABLET BY MOUTH EVERY DAY 30 tablet 3     Allergies   Allergen Reactions     Formaldehyde Rash       Reviewed and updated as needed this visit by Provider  Tobacco  Allergies  Meds  Med Hx  Soc Hx        Review of Systems   Constitutional, HEENT, cardiovascular, pulmonary, gi and gu systems are negative, except as otherwise noted.       Objective   Reported vitals:  There were no vitals taken for this visit.   healthy, alert and no distress  PSYCH: Alert and oriented times 3; coherent speech, normal   rate and volume, able to articulate logical thoughts, able   to abstract reason, no tangential  thoughts, no hallucinations   or delusions  Her affect is normal  RESP: No cough, no audible wheezing, able to talk in full sentences  Remainder of exam unable to be completed due to telephone visits    Diagnostic Test Results:  Labs reviewed in Epic        Assessment/Plan:  1. Acute cystitis without hematuria  - treated on 7/1 with Nitrofurantoin and states doing better  - fever is gone and ready to go back to work 7/14/2020 (see work letter)  - reviewed labs with patient and recommended being checked to see if return to normal with next physical in 3 months.  - fluids.  - Covid test negative      Return in about 3 months (around 10/13/2020) for Physical Exam, Lab Work, In-Clinic Visit with PCP.      Phone call duration:  8 minutes    Effie Mix CNP

## 2020-07-14 NOTE — TELEPHONE ENCOUNTER
3 attempts made:  [facility] message sent to patient.     Notified of test results.         Marine  Medical Assistant

## 2020-08-17 ENCOUNTER — TELEPHONE (OUTPATIENT)
Dept: OBGYN | Facility: CLINIC | Age: 45
End: 2020-08-17

## 2020-08-17 DIAGNOSIS — B00.9 RECURRENT HSV (HERPES SIMPLEX VIRUS): Primary | ICD-10-CM

## 2020-08-17 DIAGNOSIS — B00.9 HSV INFECTION: ICD-10-CM

## 2020-08-17 RX ORDER — VALACYCLOVIR HYDROCHLORIDE 500 MG/1
500 TABLET, FILM COATED ORAL DAILY
Qty: 30 TABLET | Refills: 1 | Status: SHIPPED | OUTPATIENT
Start: 2020-08-17 | End: 2020-08-17

## 2020-08-17 NOTE — TELEPHONE ENCOUNTER
valacyclovir      Last Written Prescription Date:  2/28/20  Last Fill Quantity: 30,   # refills: 3  Last Office Visit: 1/17/20  Future Office visit:

## 2020-08-17 NOTE — TELEPHONE ENCOUNTER
"Requested Prescriptions   Signed Prescriptions Disp Refills    valACYclovir (VALTREX) 500 MG tablet 30 tablet 1     Sig: Take 1 tablet (500 mg) by mouth daily       Antivirals for Herpes Protocol Failed - 8/17/2020  2:03 PM        Failed - Normal serum creatinine on file in past 12 months     Recent Labs   Lab Test 07/05/20  1901   CR 1.40*       Ok to refill medication if creatinine is low          Passed - Patient is age 12 or older        Passed - Recent (12 mo) or future (30 days) visit within the authorizing provider's specialty     Patient has had an office visit with the authorizing provider or a provider within the authorizing providers department within the previous 12 mos or has a future within next 30 days. See \"Patient Info\" tab in inbasket, or \"Choose Columns\" in Meds & Orders section of the refill encounter.              Passed - Medication is active on med list             Dr Ovalles is she okay to continue valcyclovir with elevated creatinine?    Shanae RUELAS R.N.        "

## 2020-08-26 ENCOUNTER — TELEPHONE (OUTPATIENT)
Dept: OBGYN | Facility: CLINIC | Age: 45
End: 2020-08-26

## 2020-08-29 RX ORDER — VALACYCLOVIR HYDROCHLORIDE 500 MG/1
500 TABLET, FILM COATED ORAL DAILY
Qty: 30 TABLET | Refills: 1 | Status: SHIPPED | OUTPATIENT
Start: 2020-08-29 | End: 2020-12-17

## 2020-09-16 NOTE — LETTER
September 23, 2019      Sloane Aguilar  [address]  [facility] [place] [address]    Dear MsManjitLauren,      This letter is to remind you that you are due for your follow up PAP smear and HPV test.    Please call [phone] to schedule your appointment at your earliest convenience.     If you have completed the tests outside of [place], please have the results forwarded to our office. We will update the chart for your primary Physician to review before your next annual physical.     Sincerely,      Your [place] Care Team//[facility]     100

## 2020-12-12 DIAGNOSIS — F43.23 ADJUSTMENT DISORDER WITH MIXED ANXIETY AND DEPRESSED MOOD: ICD-10-CM

## 2020-12-14 ENCOUNTER — PATIENT OUTREACH (OUTPATIENT)
Dept: INTERNAL MEDICINE | Facility: CLINIC | Age: 45
End: 2020-12-14

## 2020-12-14 DIAGNOSIS — N87.1 DYSPLASIA OF CERVIX, HIGH GRADE CIN 2: ICD-10-CM

## 2020-12-14 NOTE — TELEPHONE ENCOUNTER
H/O LEEP (2000)  5/9/18 ASCUS, +HR HPV, not 16/18. Plan colp  8/6/18 Meredosia Bx: NICK 1 & 2. Plan LEEP  10/22/18 LEEP NICK 1, margins: neg, ECC: neg. Plan: Pap with co-testing 4 months after cervical healing, a colposcopy 8 months after cervical healing and Pap smears at 12, 18 and 24 months after healing of the LEEP incision.  If those are normal I told her that she could go back to annual exams with co-testing  02/18/19 Dx NIL pap, neg HR HPV. Plan colp in 4 months. Pt unable to get [place]ia completed. Per provider, cotest this Fall 2019.  10/9/19 NIL, +HR HPV, not 16/18. Plan 6 month co-test due by 4/9/20.    10/16/19 MyChart result letter sent to patient  10/30/19 Result letter sent at request of RN. ([facility])  12/14/20 Reminder

## 2020-12-14 NOTE — TELEPHONE ENCOUNTER
Routing refill request to provider for review/approval because:  Alexandria given x1 and patient did not follow up, please advise    Adrian YIP RN, BSN

## 2020-12-17 ENCOUNTER — MYC REFILL (OUTPATIENT)
Dept: FAMILY MEDICINE | Facility: CLINIC | Age: 45
End: 2020-12-17

## 2020-12-17 ENCOUNTER — MYC REFILL (OUTPATIENT)
Dept: OBGYN | Facility: CLINIC | Age: 45
End: 2020-12-17

## 2020-12-17 DIAGNOSIS — B00.9 RECURRENT HSV (HERPES SIMPLEX VIRUS): ICD-10-CM

## 2020-12-17 DIAGNOSIS — F43.23 ADJUSTMENT DISORDER WITH MIXED ANXIETY AND DEPRESSED MOOD: ICD-10-CM

## 2020-12-17 RX ORDER — VALACYCLOVIR HYDROCHLORIDE 500 MG/1
500 TABLET, FILM COATED ORAL DAILY
Qty: 30 TABLET | Refills: 1 | Status: SHIPPED | OUTPATIENT
Start: 2020-12-17 | End: 2021-03-17

## 2020-12-17 NOTE — TELEPHONE ENCOUNTER
"Requested Prescriptions   Pending Prescriptions Disp Refills     valACYclovir (VALTREX) 500 MG tablet 30 tablet 1     Sig: Take 1 tablet (500 mg) by mouth daily       Antivirals for Herpes Protocol Failed - 12/17/2020 12:44 PM        Failed - Normal serum creatinine on file in past 12 months     Recent Labs   Lab Test 07/05/20  1901   CR 1.40*       Ok to refill medication if creatinine is low          Passed - Patient is age 12 or older        Passed - Recent (12 mo) or future (30 days) visit within the authorizing provider's specialty     Patient has had an office visit with the authorizing provider or a provider within the authorizing providers department within the previous 12 mos or has a future within next 30 days. See \"Patient Info\" tab in inbasket, or \"Choose Columns\" in Meds & Orders section of the refill encounter.              Passed - Medication is active on med list           Please review and advise if med can be filled.      "

## 2020-12-19 RX ORDER — ESCITALOPRAM OXALATE 20 MG/1
TABLET ORAL
Qty: 30 TABLET | Refills: 0 | OUTPATIENT
Start: 2020-12-19

## 2020-12-21 NOTE — TELEPHONE ENCOUNTER
Routing refill request to provider for review/approval because:  Alexandria given x1 and patient did not follow up, please advise  Patient needs to be seen because it has been more than 1 year since last office visit  See 10/2019 visit with KS, due for annual visit  AB is same day provider, not a PCP  [facility] and confirm   Tamiko Hernández RN, BSN  Message handled by CLINIC NURSE.

## 2020-12-23 RX ORDER — ESCITALOPRAM OXALATE 20 MG/1
20 TABLET ORAL DAILY
Qty: 30 TABLET | Refills: 0 | Status: SHIPPED | OUTPATIENT
Start: 2020-12-23 | End: 2021-01-04

## 2021-01-04 ENCOUNTER — OFFICE VISIT (OUTPATIENT)
Dept: FAMILY MEDICINE | Facility: CLINIC | Age: 46
End: 2021-01-04
Payer: COMMERCIAL

## 2021-01-04 VITALS
HEIGHT: 60 IN | OXYGEN SATURATION: 99 % | HEART RATE: 68 BPM | DIASTOLIC BLOOD PRESSURE: 80 MMHG | SYSTOLIC BLOOD PRESSURE: 118 MMHG | BODY MASS INDEX: 27.61 KG/M2 | TEMPERATURE: 98.1 F | RESPIRATION RATE: 16 BRPM | WEIGHT: 140.6 LBS

## 2021-01-04 DIAGNOSIS — F43.23 ADJUSTMENT DISORDER WITH MIXED ANXIETY AND DEPRESSED MOOD: ICD-10-CM

## 2021-01-04 DIAGNOSIS — Z23 NEED FOR PROPHYLACTIC VACCINATION AND INOCULATION AGAINST INFLUENZA: ICD-10-CM

## 2021-01-04 DIAGNOSIS — R87.810 CERVICAL HIGH RISK HPV (HUMAN PAPILLOMAVIRUS) TEST POSITIVE: ICD-10-CM

## 2021-01-04 DIAGNOSIS — N87.1 DYSPLASIA OF CERVIX, HIGH GRADE CIN 2: ICD-10-CM

## 2021-01-04 DIAGNOSIS — Z12.4 SCREENING FOR MALIGNANT NEOPLASM OF CERVIX: ICD-10-CM

## 2021-01-04 DIAGNOSIS — Z00.00 ROUTINE GENERAL MEDICAL EXAMINATION AT A HEALTH CARE FACILITY: Primary | ICD-10-CM

## 2021-01-04 LAB
ANION GAP SERPL CALCULATED.3IONS-SCNC: 6 MMOL/L (ref 3–14)
BUN SERPL-MCNC: 10 MG/DL (ref 7–30)
CALCIUM SERPL-MCNC: 8.6 MG/DL (ref 8.5–10.1)
CHLORIDE SERPL-SCNC: 104 MMOL/L (ref 94–109)
CHOLEST SERPL-MCNC: 238 MG/DL
CO2 SERPL-SCNC: 28 MMOL/L (ref 20–32)
CREAT SERPL-MCNC: 0.71 MG/DL (ref 0.52–1.04)
GFR SERPL CREATININE-BSD FRML MDRD: >90 ML/MIN/{1.73_M2}
GLUCOSE SERPL-MCNC: 91 MG/DL (ref 70–99)
HDLC SERPL-MCNC: 82 MG/DL
LDLC SERPL CALC-MCNC: 138 MG/DL
NONHDLC SERPL-MCNC: 156 MG/DL
POTASSIUM SERPL-SCNC: 3.7 MMOL/L (ref 3.4–5.3)
SODIUM SERPL-SCNC: 138 MMOL/L (ref 133–144)
TRIGL SERPL-MCNC: 89 MG/DL

## 2021-01-04 PROCEDURE — 80061 LIPID PANEL: CPT | Performed by: PHYSICIAN ASSISTANT

## 2021-01-04 PROCEDURE — G0145 SCR C/V CYTO,THINLAYER,RESCR: HCPCS | Performed by: PHYSICIAN ASSISTANT

## 2021-01-04 PROCEDURE — 36415 COLL VENOUS BLD VENIPUNCTURE: CPT | Performed by: PHYSICIAN ASSISTANT

## 2021-01-04 PROCEDURE — 87624 HPV HI-RISK TYP POOLED RSLT: CPT | Performed by: PHYSICIAN ASSISTANT

## 2021-01-04 PROCEDURE — 90686 IIV4 VACC NO PRSV 0.5 ML IM: CPT | Performed by: PHYSICIAN ASSISTANT

## 2021-01-04 PROCEDURE — 99396 PREV VISIT EST AGE 40-64: CPT | Mod: 25 | Performed by: PHYSICIAN ASSISTANT

## 2021-01-04 PROCEDURE — 90471 IMMUNIZATION ADMIN: CPT | Performed by: PHYSICIAN ASSISTANT

## 2021-01-04 PROCEDURE — G0124 SCREEN C/V THIN LAYER BY MD: HCPCS | Performed by: PATHOLOGY

## 2021-01-04 PROCEDURE — 80048 BASIC METABOLIC PNL TOTAL CA: CPT | Performed by: PHYSICIAN ASSISTANT

## 2021-01-04 RX ORDER — ESCITALOPRAM OXALATE 20 MG/1
20 TABLET ORAL DAILY
Qty: 90 TABLET | Refills: 3 | Status: SHIPPED | OUTPATIENT
Start: 2021-01-04 | End: 2022-03-31

## 2021-01-04 ASSESSMENT — MIFFLIN-ST. JEOR: SCORE: 1194.26

## 2021-01-04 ASSESSMENT — ENCOUNTER SYMPTOMS
WEAKNESS: 0
NAUSEA: 0
DIARRHEA: 0
PALPITATIONS: 0
SHORTNESS OF BREATH: 0
SORE THROAT: 0
DIZZINESS: 0
HEMATURIA: 0
CHILLS: 0
JOINT SWELLING: 0
NERVOUS/ANXIOUS: 0
HEARTBURN: 0
PARESTHESIAS: 0
CONSTIPATION: 0
FREQUENCY: 0
COUGH: 0
HEMATOCHEZIA: 0
HEADACHES: 0
MYALGIAS: 0
ARTHRALGIAS: 0
FEVER: 0
EYE PAIN: 0
ABDOMINAL PAIN: 0
DYSURIA: 0

## 2021-01-04 ASSESSMENT — ANXIETY QUESTIONNAIRES
6. BECOMING EASILY ANNOYED OR IRRITABLE: NOT AT ALL
1. FEELING NERVOUS, ANXIOUS, OR ON EDGE: NOT AT ALL
3. WORRYING TOO MUCH ABOUT DIFFERENT THINGS: NOT AT ALL
7. FEELING AFRAID AS IF SOMETHING AWFUL MIGHT HAPPEN: NOT AT ALL
IF YOU CHECKED OFF ANY PROBLEMS ON THIS QUESTIONNAIRE, HOW DIFFICULT HAVE THESE PROBLEMS MADE IT FOR YOU TO DO YOUR WORK, TAKE CARE OF THINGS AT HOME, OR GET ALONG WITH OTHER PEOPLE: NOT DIFFICULT AT ALL
2. NOT BEING ABLE TO STOP OR CONTROL WORRYING: NOT AT ALL
5. BEING SO RESTLESS THAT IT IS HARD TO SIT STILL: NOT AT ALL
GAD7 TOTAL SCORE: 0

## 2021-01-04 ASSESSMENT — PATIENT HEALTH QUESTIONNAIRE - PHQ9
SUM OF ALL RESPONSES TO PHQ QUESTIONS 1-9: 0
5. POOR APPETITE OR OVEREATING: NOT AT ALL

## 2021-01-04 NOTE — PROGRESS NOTES
SUBJECTIVE:   CC: Sloane Aguilar is an 45 year old woman who presents for preventive health visit.       Patient has been advised of split billing requirements and indicates understanding: Yes  Healthy Habits:     Getting at least 3 servings of Calcium per day:  Yes    Bi-annual eye exam:  Yes    Dental care twice a year:  Yes    Sleep apnea or symptoms of sleep apnea:  None    Diet:  Regular (no restrictions)    Frequency of exercise:  2-3 days/week    Duration of exercise:  30-45 minutes    Taking medications regularly:  Yes    Medication side effects:  Not applicable    PHQ-2 Total Score: 0    Additional concerns today:  No    History of anxiety, takes lexapro 20 mg daily with good control. No side effects. No SI/HI.    Today's PHQ-2 Score:   PHQ-2 ( 1999 Pfizer) 1/4/2021   Q1: Little interest or pleasure in doing things 0   Q2: Feeling down, depressed or hopeless 0   PHQ-2 Score 0   Q1: Little interest or pleasure in doing things Not at all   Q2: Feeling down, depressed or hopeless Not at all   PHQ-2 Score 0       Abuse: Current or Past (Physical, Sexual or Emotional) - No  Do you feel safe in your environment? Yes        Social History     Tobacco Use     Smoking status: Never Smoker     Smokeless tobacco: Never Used   Substance Use Topics     Alcohol use: Yes     Alcohol/week: 0.0 standard drinks     Comment: 2 wine several x weekly         Alcohol Use 1/4/2021   Prescreen: >3 drinks/day or >7 drinks/week? No   Prescreen: >3 drinks/day or >7 drinks/week? -       Reviewed orders with patient.  Reviewed health maintenance and updated orders accordingly - Yes  Lab work is in process  Labs reviewed in EPIC  BP Readings from Last 3 Encounters:   01/04/21 118/80   07/05/20 120/80   07/01/20 110/78    Wt Readings from Last 3 Encounters:   01/04/21 63.8 kg (140 lb 9.6 oz)   07/05/20 63.7 kg (140 lb 6.4 oz)   07/01/20 62.6 kg (138 lb)                  Patient Active Problem List   Diagnosis     Anxiety      CARDIOVASCULAR SCREENING; LDL GOAL LESS THAN 160     Cocaine dependence in remission (H)     Elevated fasting glucose     Dysplasia of cervix, high grade NICK 2     Cervical high risk HPV (human papillomavirus) test positive     Past Surgical History:   Procedure Laterality Date     C APPENDECTOMY       HC TOOTH EXTRACTION W/FORCEP       HERNIA REPAIR, UMBILICAL  2007     LEEP TX, CERVICAL  2000, 2018    LEEP TX Cervical     TUBAL LIGATION  2007    ablation       Social History     Tobacco Use     Smoking status: Never Smoker     Smokeless tobacco: Never Used   Substance Use Topics     Alcohol use: Yes     Alcohol/week: 0.0 standard drinks     Comment: 2 wine several x weekly     Family History   Problem Relation Age of Onset     Hypertension Mother      Cancer Mother         uterine dx 2012     Heart Disease Mother      Heart Disease Father      Heart Disease Brother 48        stint         Current Outpatient Medications   Medication Sig Dispense Refill     aluminum chloride (DRYSOL) 20 % external solution Use daily as directed 35 mL prn     clotrimazole-betamethasone (LOTRISONE) 1-0.05 % external cream Apply topically 2 times daily 30 g 1     escitalopram (LEXAPRO) 20 MG tablet Take 1 tablet (20 mg) by mouth daily 90 tablet 3     traZODone (DESYREL) 50 MG tablet Take 1-2 tablets ( mg) by mouth nightly as needed for sleep (Patient taking differently: Take  mg by mouth as needed for sleep ) 180 tablet 1     valACYclovir (VALTREX) 500 MG tablet Take 1 tablet (500 mg) by mouth daily 30 tablet 1     Allergies   Allergen Reactions     Formaldehyde Rash     Recent Labs   Lab Test 07/05/20  1901 10/09/19  0952 05/09/18  0947 05/09/18  0947 01/23/17  0929 10/18/13  0742 10/18/13  0742   A1C  --   --   --   --  4.8  --   --    LDL  --  125*  --  101* 89   < > 88   HDL  --  95  --  103 106   < > 127*   TRIG  --  88  --  85 84   < > 83   ALT 39  --   --   --  42  --  41   CR 1.40* 0.72   < > 0.72 0.78   < > 0.80    GFRESTIMATED 44* >90   < > 88 81   < > 79   GFRESTBLACK 51* >90   < > >90 >90  African American GFR Calc     < > >90   POTASSIUM 3.5 4.1   < > 4.2 4.0   < > 4.0   TSH  --  1.36  --   --  2.94  --   --     < > = values in this interval not displayed.        Mammogram Screening: Patient under age 50, mutual decision reflected in health maintenance.      Pertinent mammograms are reviewed under the imaging tab.  History of abnormal Pap smear:   YES - updated in Problem List and Health Maintenance accordingly  Last 3 Pap and HPV Results:   PAP / HPV Latest Ref Rng & Units 10/9/2019 2/18/2019 5/9/2018   PAP - NIL NIL ASC-US(A)   HPV 16 DNA NEG:Negative Negative Negative Negative   HPV 18 DNA NEG:Negative Negative Negative Negative   OTHER HR HPV NEG:Negative Positive(A) Negative Positive(A)     PAP / HPV Latest Ref Rng & Units 10/9/2019 2/18/2019 5/9/2018   PAP - NIL NIL ASC-US(A)   HPV 16 DNA NEG:Negative Negative Negative Negative   HPV 18 DNA NEG:Negative Negative Negative Negative   OTHER HR HPV NEG:Negative Positive(A) Negative Positive(A)     Reviewed and updated as needed this visit by clinical staff  Tobacco  Allergies  Meds  Problems  Med Hx  Surg Hx  Fam Hx  Soc Hx          Reviewed and updated as needed this visit by Provider  Tobacco  Allergies  Meds  Problems  Med Hx  Surg Hx  Fam Hx             Review of Systems   Constitutional: Negative for chills and fever.   HENT: Negative for congestion, ear pain, hearing loss and sore throat.    Eyes: Negative for pain and visual disturbance.   Respiratory: Negative for cough and shortness of breath.    Cardiovascular: Negative for chest pain, palpitations and peripheral edema.   Gastrointestinal: Negative for abdominal pain, constipation, diarrhea, heartburn, hematochezia and nausea.   Genitourinary: Negative for dysuria, frequency, genital sores, hematuria and urgency.   Musculoskeletal: Negative for arthralgias, joint swelling and myalgias.   Skin:  Negative for rash.   Neurological: Negative for dizziness, weakness, headaches and paresthesias.   Psychiatric/Behavioral: Negative for mood changes. The patient is not nervous/anxious.        OBJECTIVE:   /80 (BP Location: Right arm, Cuff Size: Adult Regular)   Pulse 68   Temp 98.1  F (36.7  C) (Oral)   Resp 16   Ht 1.524 m (5')   Wt 63.8 kg (140 lb 9.6 oz)   SpO2 99%   BMI 27.46 kg/m    Physical Exam  GENERAL: healthy, alert and no distress  EYES: Eyes grossly normal to inspection, PERRL and conjunctivae and sclerae normal  HENT: ear canals and TM's normal, nose and mouth without ulcers or lesions  NECK: no adenopathy, no asymmetry, masses, or scars and thyroid normal to palpation  RESP: lungs clear to auscultation - no rales, rhonchi or wheezes  BREAST: normal without masses, tenderness or nipple discharge and no palpable axillary masses or adenopathy  CV: regular rate and rhythm, normal S1 S2, no S3 or S4, no murmur, click or rub, no peripheral edema and peripheral pulses strong  ABDOMEN: soft, nontender, no hepatosplenomegaly, no masses and bowel sounds normal   (female): normal female external genitalia, normal urethral meatus, vaginal mucosa pink, moist, well rugated, and normal cervix/adnexa/uterus without masses or discharge  MS: no gross musculoskeletal defects noted, no edema  SKIN: no suspicious lesions or rashes  NEURO: Normal strength and tone, mentation intact and speech normal  PSYCH: mentation appears normal, affect normal/bright    Diagnostic Test Results:  Labs reviewed in Epic    ASSESSMENT/PLAN:   1. Routine general medical examination at a health care facility  Reviewed personal and family history. Reviewed age appropriate screenings. Recommended any needed vaccinations. Continue to focus on well balanced diet and exercise.   - Lipid panel reflex to direct LDL Fasting  - Basic metabolic panel  (Ca, Cl, CO2, Creat, Gluc, K, Na, BUN)    2. Screening for malignant neoplasm of  cervix  3. Dysplasia of cervix, high grade NICK 2  4. Cervical high risk HPV (human papillomavirus) test positive  Per chart review, pap tracking:  H/O LEEP (2000)  5/9/18 ASCUS, +HR HPV, not 16/18. Plan colp  8/6/18 [place] Bx: NICK 1 & 2. Plan LEEP  10/22/18 LEEP NICK 1, margins: neg, ECC: neg. Plan: Pap with co-testing 4 months after cervical healing, a colposcopy 8 months after cervical healing and Pap smears at 12, 18 and 24 months after healing of the LEEP incision.  If those are normal I told her that she could go back to annual exams with co-testing  02/18/19 Dx NIL pap, neg HR HPV. Plan colp in 4 months. Pt unable to get [place] completed. Per provider, cotest this Fall 2019.  10/9/19 NIL, +HR HPV, not 16/18. Plan 6 month co-test due by 4/9/20.    - Pap imaged thin layer screen with HPV - recommended age 30 - 65 years (select HPV order below)  - HPV High Risk Types DNA Cervical    5. Need for prophylactic vaccination and inoculation against influenza  - INFLUENZA VACCINE IM > 6 MONTHS VALENT IIV4 [72705]    6. anxiety  Chronic issue, well controlled with Lexapro. PHQ/TAL updated today. No HI/SI. Continue present management. Refills given.  - escitalopram (LEXAPRO) 20 MG tablet; Take 1 tablet (20 mg) by mouth daily  Dispense: 90 tablet; Refill: 3    Patient has been advised of split billing requirements and indicates understanding: Yes  COUNSELING:  Reviewed preventive health counseling, as reflected in patient instructions    Estimated body mass index is 27.46 kg/m  as calculated from the following:    Height as of this encounter: 1.524 m (5').    Weight as of this encounter: 63.8 kg (140 lb 9.6 oz).    Weight management plan: Discussed healthy diet and exercise guidelines    She reports that she has never smoked. She has never used smokeless tobacco.      Counseling Resources:  ATP IV Guidelines  Pooled Cohorts Equation Calculator  Breast Cancer Risk Calculator  BRCA-Related Cancer Risk Assessment: FHS-7 Tool  FRAX  Risk Assessment  ICSI Preventive Guidelines  Dietary Guidelines for Americans, 2010  USDA's MyPlate  ASA Prophylaxis  Lung CA Screening    ALFA [facility]

## 2021-01-05 ASSESSMENT — ANXIETY QUESTIONNAIRES: GAD7 TOTAL SCORE: 0

## 2021-01-06 LAB
COPATH REPORT: ABNORMAL
PAP: ABNORMAL

## 2021-01-07 ENCOUNTER — ANCILLARY PROCEDURE (OUTPATIENT)
Dept: MAMMOGRAPHY | Facility: CLINIC | Age: 46
End: 2021-01-07
Attending: NURSE PRACTITIONER
Payer: COMMERCIAL

## 2021-01-07 ENCOUNTER — PATIENT OUTREACH (OUTPATIENT)
Dept: FAMILY MEDICINE | Facility: CLINIC | Age: 46
End: 2021-01-07

## 2021-01-07 DIAGNOSIS — Z12.31 VISIT FOR SCREENING MAMMOGRAM: ICD-10-CM

## 2021-01-07 DIAGNOSIS — R87.810 CERVICAL HIGH RISK HPV (HUMAN PAPILLOMAVIRUS) TEST POSITIVE: ICD-10-CM

## 2021-01-07 DIAGNOSIS — N87.1 DYSPLASIA OF CERVIX, HIGH GRADE CIN 2: ICD-10-CM

## 2021-01-07 PROCEDURE — 77067 SCR MAMMO BI INCL CAD: CPT | Mod: TC | Performed by: RADIOLOGY

## 2021-01-07 NOTE — TELEPHONE ENCOUNTER
1/4/21 ASCUS, Neg HPV. Plan [place] bef 4/4/21  *After excision or ablation for NICK 2+, patient needs a cotest in 6 months, 18 months, & 30 months, then a cotest every 3 years for at least 25 years. (2019 ASCCP guideline) Any abnormal pap or + HR HPV test within the 25 years warrants a colposcopy.* (2019 ASCCP advisor answer).

## 2021-02-22 ENCOUNTER — OFFICE VISIT (OUTPATIENT)
Dept: OBGYN | Facility: CLINIC | Age: 46
End: 2021-02-22
Payer: COMMERCIAL

## 2021-02-22 VITALS — SYSTOLIC BLOOD PRESSURE: 104 MMHG | BODY MASS INDEX: 27.34 KG/M2 | WEIGHT: 140 LBS | DIASTOLIC BLOOD PRESSURE: 72 MMHG

## 2021-02-22 DIAGNOSIS — N87.1 DYSPLASIA OF CERVIX, HIGH GRADE CIN 2: ICD-10-CM

## 2021-02-22 DIAGNOSIS — R87.810 CERVICAL HIGH RISK HPV (HUMAN PAPILLOMAVIRUS) TEST POSITIVE: ICD-10-CM

## 2021-02-22 PROCEDURE — 88305 TISSUE EXAM BY PATHOLOGIST: CPT | Performed by: PATHOLOGY

## 2021-02-22 PROCEDURE — 57454 BX/CURETT OF CERVIX W/SCOPE: CPT | Performed by: OBSTETRICS & GYNECOLOGY

## 2021-02-22 NOTE — PATIENT INSTRUCTIONS
You can reach your [facility] Care Team any time of the day by calling [phone]. This number will put you in touch with the 24 hour nurse line if the clinic is closed.    To contact your OB/GYN Station Coordinator/Surgery Scheduler please call [phone]. This is a direct number for your care team between 8 a.m. and 4 p.m. Monday through Friday.    [facility] is open for your convenience:  Monday through Friday 8 a.m. to 6 p.m.  Closed weekends and all major holidays.

## 2021-02-22 NOTE — PROGRESS NOTES
February 22, 2021  Sloane Aguilar  1975   45 year old    Reason for abnormal colpo:  ASCUS pap with neg HR HPV  Referring MD:  Jay Jay COLVIN  Written/Informed consent?  Yes  Patient Education materials?  Yes    No LMP recorded. Patient has had an ablation. and TL  Pregnant? No  Abnormal bleeding? No    Current Outpatient Medications:      aluminum chloride (DRYSOL) 20 % external solution, Use daily as directed, Disp: 35 mL, Rfl: prn     clotrimazole-betamethasone (LOTRISONE) 1-0.05 % external cream, Apply topically 2 times daily, Disp: 30 g, Rfl: 1     escitalopram (LEXAPRO) 20 MG tablet, Take 1 tablet (20 mg) by mouth daily, Disp: 90 tablet, Rfl: 3     traZODone (DESYREL) 50 MG tablet, Take 1-2 tablets ( mg) by mouth nightly as needed for sleep (Patient taking differently: Take  mg by mouth as needed for sleep ), Disp: 180 tablet, Rfl: 1     valACYclovir (VALTREX) 500 MG tablet, Take 1 tablet (500 mg) by mouth daily, Disp: 30 tablet, Rfl: 1  Allergies   Allergen Reactions     Formaldehyde Rash     Contraception: TL endometrial ablation  Age of first intercourse: teens  Total partners: several  Current partner:  No current steady long term partner  STD HX: HPV other strains of HR HPV in the past  STD Culture:No     Sexual abuse: No    HPV Vaccine:No  Smoker:No  ETOH: Yes  Drugs: No    Past Medical History:   Diagnosis Date     Abnormal cervical Papanicolaou smear 05/09/2018    see problem list     anxiety      Cervical high risk HPV (human papillomavirus) test positive 5/9/2018    See problem list     Social History     Tobacco Use     Smoking status: Never Smoker     Smokeless tobacco: Never Used   Substance Use Topics     Alcohol use: Yes     Alcohol/week: 0.0 standard drinks     Comment: 2 wine several x weekly     Drug use: No       Family history of female cancer(s):   Family History   Problem Relation Age of Onset     Hypertension Mother      Cancer Mother         uterine dx 2012     Heart  Disease Mother      Heart Disease Father      Heart Disease Brother 48        stint     H/O LEEP (2000)  5/9/18 ASCUS, +HR HPV, not 16/18. Plan colp  8/6/18 [place] Bx: NICK 1 & 2. Plan LEEP  10/22/18 LEEP NICK 1, margins: neg, ECC: neg. Plan: Pap with co-testing 4 months after cervical healing, a colposcopy 8 months after cervical healing and Pap smears at 12, 18 and 24 months after healing of the LEEP incision.  If those are normal I told her that she could go back to annual exams with co-testing  02/18/19 Dx NIL pap, neg HR HPV. Plan colp in 4 months. Pt unable to get [place] completed. Per provider, cotest this Fall 2019.  10/9/19 NIL, +HR HPV, not 16/18. Plan 6 month co-test due by 4/9/20.    10/16/19 MyChart result letter sent to patient  10/30/19 Result letter sent at request of RN. ([facility])  12/14/20 Reminder MyChart  1/4/21 ASCUS, Neg HPV. Plan [place] bef 4/4/21  *After excision or ablation for NICK 2+, patient needs a cotest in 6 months, 18 months, & 30 months, then a cotest every 3 years for at least 25 years. (2019 ASCCP guideline) Any abnormal pap or + HR HPV test within the 25 years warrants a colposcopy.* (2019 ASCCP advisor answer).    1/11/21 Left msg and MyChart result letter sent. Pt notified by phone        Pap Hx:    Lab Results   Component Value Date    PAP ASC-US 01/04/2021       Colpo Hx:  As above    Treatment HX:  Past Surgical History:   Procedure Laterality Date     C APPENDECTOMY       HC TOOTH EXTRACTION W/FORCEP       HERNIA REPAIR, UMBILICAL  2007     LEEP TX, CERVICAL  2000, 2018    LEEP TX Cervical     TUBAL LIGATION  2007    ablation       PROCEDURE:  COLPOSCOPY   After a procedural timeout was taken, she was positioned in dorsal lithotomy and a speculum was inserted to allow visualization of the cervix. A 5% acetic acid solution was applied to the ectocervix with large swabs.   Colposcopic examination was then undertaken of the cervix, distal vaginal canal and vaginal fornices    Yes  Transformation zone TZ  No  Columnar epithellum C  Yes  Squamocolumnar junction SCJ  No  Original SCJ  Yes  Squamous metaplasia OSCJ  No  Nabothian cyst  N  No  Gland openings G  Yes  Acetowhite epithelium AWE  No  Punctuation line /coarse /reverse P  No  Mosaic fine/ coarse  M  No  Atypical vessels  AV  No  Suspect cancer  Ca  Yes  Wray adequate   Normal CE in canal  No abnormal vessels  Vagina and vulva normal  Wray adequate    (N87.1) Dysplasia of cervix, high grade NICK 2  Comment: bx from 5:00 and scant ECC  Impression subcutaneous metaplasia with infalmation  Plan: Nothing in vagina for 48 hrs, call for bleeding > a period, pain not relieved w tylenol or advil, call in 1 week to review path and devel a plan      (R87.030) Cervical high risk HPV (human papillomavirus) test positive  Comment: as abover  Plan: if this path report benign would anticipate a re[peat pap with co test in 1 yr

## 2021-02-22 NOTE — NURSING NOTE
Chief Complaint   Patient presents with     Colposcopy       Initial /72   Wt 63.5 kg (140 lb)   BMI 27.34 kg/m   Estimated body mass index is 27.34 kg/m  as calculated from the following:    Height as of 21: 1.524 m (5').    Weight as of this encounter: 63.5 kg (140 lb).  BP completed using cuff size: regular    Questioned patient about current smoking habits.  Pt. has never smoked.          The following HM Due: NONE      The following patient reported/Care Every where data was sent to:  P ABSTRACT QUALITY INITIATIVES [[id number]]        Iesha Garcia, [facility]

## 2021-02-23 LAB — COPATH REPORT: NORMAL

## 2021-02-23 NOTE — RESULT ENCOUNTER NOTE
I left a voicemail message for the patient to return my call.  The Pap smear she had in January 2021 returned as ASCUS which was negative for high risk HPV.  This pathology report reveals a small foci of NICK-1.  I would like her to repeat a Pap and cotesting in 6 months.   could you please reach out to her and help her schedule this  Thank you  Hany Ovalles MD FACOG

## 2021-02-24 ENCOUNTER — PATIENT OUTREACH (OUTPATIENT)
Dept: OBGYN | Facility: CLINIC | Age: 46
End: 2021-02-24
Payer: COMMERCIAL

## 2021-02-24 ENCOUNTER — TELEPHONE (OUTPATIENT)
Dept: OBGYN | Facility: CLINIC | Age: 46
End: 2021-02-24

## 2021-02-24 DIAGNOSIS — N87.1 DYSPLASIA OF CERVIX, HIGH GRADE CIN 2: ICD-10-CM

## 2021-02-24 DIAGNOSIS — R87.810 CERVICAL HIGH RISK HPV (HUMAN PAPILLOMAVIRUS) TEST POSITIVE: ICD-10-CM

## 2021-02-24 NOTE — TELEPHONE ENCOUNTER
Patient notified of results and recommendations.  She will call back in a few months to schedule repeat pap in August.  Shawanda Castro RN

## 2021-02-24 NOTE — TELEPHONE ENCOUNTER
Patient is calling to speak with Triage about her Colposcopy results. Please call patient back today.

## 2021-02-24 NOTE — LETTER
September 8, 2021      Sloane Aguilar  [address]  [place] [address]        Dear ,    At [facility], your health and wellness are our primary concern. That is why we are following up on your most recent Colposcopy.    Please call [phone] to schedule an appointment for your recommended follow-up Pap smear and Human Papillomavirus (HPV) test at your earliest convenience.     If you have completed the appointment outside of the [facility] system, please have the records forwarded to our office. We will update your chart for your provider to review before your next annual wellness visit.     Thank you for choosing [facility]!      Sincerely,    Your [facility] Care Team

## 2021-03-17 DIAGNOSIS — B00.9 RECURRENT HSV (HERPES SIMPLEX VIRUS): ICD-10-CM

## 2021-03-17 RX ORDER — VALACYCLOVIR HYDROCHLORIDE 500 MG/1
500 TABLET, FILM COATED ORAL DAILY
Qty: 30 TABLET | Refills: 1 | Status: SHIPPED | OUTPATIENT
Start: 2021-03-17 | End: 2021-06-28

## 2021-03-17 NOTE — TELEPHONE ENCOUNTER
valacyclovir      Last Written Prescription Date:  12/17/20  Last Fill Quantity: 30,   # refills: 1  Last Office Visit: 2/22/21  Future Office visit:

## 2021-03-17 NOTE — TELEPHONE ENCOUNTER
Prescription approved per [facility] Refill Protocol.    Jess Cowan RN

## 2021-03-24 ENCOUNTER — IMMUNIZATION (OUTPATIENT)
Dept: NURSING | Facility: CLINIC | Age: 46
End: 2021-03-24
Payer: COMMERCIAL

## 2021-03-24 PROCEDURE — 0001A PR COVID VAC PFIZER DIL RECON 30 MCG/0.3 ML IM: CPT

## 2021-03-24 PROCEDURE — 91300 PR COVID VAC PFIZER DIL RECON 30 MCG/0.3 ML IM: CPT

## 2021-04-14 ENCOUNTER — OFFICE VISIT (OUTPATIENT)
Dept: NURSING | Facility: CLINIC | Age: 46
End: 2021-04-14
Attending: INTERNAL MEDICINE
Payer: COMMERCIAL

## 2021-04-14 PROCEDURE — 91300 PR COVID VAC PFIZER DIL RECON 30 MCG/0.3 ML IM: CPT

## 2021-04-14 PROCEDURE — 0002A PR COVID VAC PFIZER DIL RECON 30 MCG/0.3 ML IM: CPT

## 2021-06-28 DIAGNOSIS — B00.9 RECURRENT HSV (HERPES SIMPLEX VIRUS): ICD-10-CM

## 2021-06-28 RX ORDER — VALACYCLOVIR HYDROCHLORIDE 500 MG/1
500 TABLET, FILM COATED ORAL DAILY
Qty: 30 TABLET | Refills: 1 | Status: SHIPPED | OUTPATIENT
Start: 2021-06-28 | End: 2021-09-17

## 2021-06-28 NOTE — TELEPHONE ENCOUNTER
valacyclovir      Last Written Prescription Date:  3/17/21  Last Fill Quantity: 30,   # refills: 1  Last Office Visit: 2/22/21  Future Office visit:

## 2021-06-28 NOTE — TELEPHONE ENCOUNTER
Prescription approved per [facility] Refill Protocol.  Jennifer JOHNS RN

## 2021-10-24 ENCOUNTER — HEALTH MAINTENANCE LETTER (OUTPATIENT)
Age: 46
End: 2021-10-24

## 2021-11-10 NOTE — TELEPHONE ENCOUNTER
FYI to provider - Patient is lost to pap tracking follow-up. Attempts to contact pt have been made per reminder process and there has been no reply and/or no appt scheduled.      
H/O LEEP (2000)  5/9/18 ASCUS, +HR HPV, not 16/18. Plan colp  8/6/18 [place] Bx: NICK 1 & 2. Plan LEEP  10/22/18 LEEP NICK 1, margins: neg, ECC: neg. Plan: Pap with co-testing 4 months after cervical healing, a colposcopy 8 months after cervical healing and Pap smears at 12, 18 and 24 months after healing of the LEEP incision.  If those are normal I told her that she could go back to annual exams with co-testing  02/18/19 Dx NIL pap, neg HR HPV. Plan colp in 4 months. Pt unable to get [place] completed. Per provider, cotest this Fall 2019.  10/9/19 NIL, +HR HPV, not 16/18. Plan 6 month co-test due by 4/9/20.    10/16/19 MyChart result letter sent to patient  10/30/19 Result letter sent at request of RN. ([facility])  12/14/20 Reminder MyChart  1/4/21 ASCUS, Neg HPV. Plan [place] bef 4/4/21  *After excision or ablation for NICK 2+, patient needs a cotest in 6 months, 18 months, & 30 months, then a cotest every 3 years for at least 25 years. (2019 ASCCP guideline) Any abnormal pap or + HR HPV test within the 25 years warrants a colposcopy.* (2019 ASCCP advisor answer).    1/11/21 Left msg and MyChart result letter sent. Pt notified by phone  2/22/21 [place] Bx & ECC: NICK 1. Plan 6 month cotest (due 8/22/21)  
Patient due for Pap and HPV.    Reminder done today via [employer].    
Patient due for Pap and HPV.    Reminder done today via letter.  
Patient due for Pap and HPV.    Reminder done today via telephone call - LM.    
negative...

## 2021-12-30 DIAGNOSIS — B00.9 RECURRENT HSV (HERPES SIMPLEX VIRUS): ICD-10-CM

## 2021-12-30 RX ORDER — VALACYCLOVIR HYDROCHLORIDE 500 MG/1
TABLET, FILM COATED ORAL
Qty: 30 TABLET | Refills: 1 | Status: SHIPPED | OUTPATIENT
Start: 2021-12-30 | End: 2022-03-07

## 2021-12-30 NOTE — TELEPHONE ENCOUNTER
Prescription approved per [facility] Refill Protocol.  Shawanda Castro RN

## 2022-01-17 ENCOUNTER — ANCILLARY PROCEDURE (OUTPATIENT)
Dept: MAMMOGRAPHY | Facility: CLINIC | Age: 47
End: 2022-01-17
Attending: INTERNAL MEDICINE
Payer: COMMERCIAL

## 2022-01-17 DIAGNOSIS — Z12.31 VISIT FOR SCREENING MAMMOGRAM: ICD-10-CM

## 2022-01-17 PROCEDURE — 77067 SCR MAMMO BI INCL CAD: CPT | Mod: TC | Performed by: RADIOLOGY

## 2022-01-17 PROCEDURE — 77063 BREAST TOMOSYNTHESIS BI: CPT | Mod: TC | Performed by: RADIOLOGY

## 2022-02-13 ENCOUNTER — HEALTH MAINTENANCE LETTER (OUTPATIENT)
Age: 47
End: 2022-02-13

## 2022-03-07 DIAGNOSIS — B00.9 RECURRENT HSV (HERPES SIMPLEX VIRUS): ICD-10-CM

## 2022-03-07 RX ORDER — VALACYCLOVIR HYDROCHLORIDE 500 MG/1
TABLET, FILM COATED ORAL
Qty: 30 TABLET | Refills: 1 | Status: SHIPPED | OUTPATIENT
Start: 2022-03-07 | End: 2022-07-19

## 2022-03-07 NOTE — TELEPHONE ENCOUNTER
Medication is being filled for 1 time refill only due to:  Patient needs to be seen because it has been more than one year since last visit.     Jennifer JOHNS RN

## 2022-03-31 DIAGNOSIS — F43.23 ADJUSTMENT DISORDER WITH MIXED ANXIETY AND DEPRESSED MOOD: ICD-10-CM

## 2022-03-31 NOTE — TELEPHONE ENCOUNTER
Routing refill request to provider for review/approval because:  Drug interaction warning  Patient needs to be seen because:  due for 6 month follow up  Abnormal PHQ9    Delfina Castellanos RN on 3/31/2022 at 3:48 PM

## 2022-04-01 RX ORDER — ESCITALOPRAM OXALATE 20 MG/1
TABLET ORAL
Qty: 30 TABLET | OUTPATIENT
Start: 2022-04-01

## 2022-04-01 NOTE — TELEPHONE ENCOUNTER
I have not seen this patient since 2014, patient has been seeing Jaqueline Goyal PA-C at [facility], please forward appropriately

## 2022-04-04 RX ORDER — ESCITALOPRAM OXALATE 20 MG/1
TABLET ORAL
Qty: 90 TABLET | Refills: 0 | Status: SHIPPED | OUTPATIENT
Start: 2022-04-04 | End: 2022-07-18

## 2022-04-04 NOTE — TELEPHONE ENCOUNTER
Due for physical and med check. Can schedule lexapro med check virtually and physical at another time if that works better for her. Please contact to schedule.    Jaqueline Goyal PA-C

## 2022-06-03 ENCOUNTER — OFFICE VISIT (OUTPATIENT)
Dept: OBGYN | Facility: CLINIC | Age: 47
End: 2022-06-03
Payer: COMMERCIAL

## 2022-06-03 VITALS — DIASTOLIC BLOOD PRESSURE: 72 MMHG | WEIGHT: 143 LBS | SYSTOLIC BLOOD PRESSURE: 106 MMHG | BODY MASS INDEX: 27.93 KG/M2

## 2022-06-03 DIAGNOSIS — N95.1 MENOPAUSAL SYNDROME (HOT FLASHES): ICD-10-CM

## 2022-06-03 DIAGNOSIS — Z01.411 ENCOUNTER FOR GYNECOLOGICAL EXAMINATION WITH ABNORMAL FINDING: ICD-10-CM

## 2022-06-03 DIAGNOSIS — N87.1 DYSPLASIA OF CERVIX, HIGH GRADE CIN 2: Primary | ICD-10-CM

## 2022-06-03 DIAGNOSIS — R87.810 CERVICAL HIGH RISK HPV (HUMAN PAPILLOMAVIRUS) TEST POSITIVE: ICD-10-CM

## 2022-06-03 DIAGNOSIS — Z13.6 CARDIOVASCULAR SCREENING; LDL GOAL LESS THAN 100: ICD-10-CM

## 2022-06-03 PROCEDURE — 88175 CYTOPATH C/V AUTO FLUID REDO: CPT | Performed by: OBSTETRICS & GYNECOLOGY

## 2022-06-03 PROCEDURE — 99396 PREV VISIT EST AGE 40-64: CPT | Performed by: OBSTETRICS & GYNECOLOGY

## 2022-06-03 PROCEDURE — 99212 OFFICE O/P EST SF 10 MIN: CPT | Mod: 25 | Performed by: OBSTETRICS & GYNECOLOGY

## 2022-06-03 PROCEDURE — 87624 HPV HI-RISK TYP POOLED RSLT: CPT | Performed by: OBSTETRICS & GYNECOLOGY

## 2022-06-03 NOTE — NURSING NOTE
Chief Complaint   Patient presents with     Physical       Initial /72   Wt 64.9 kg (143 lb)   BMI 27.93 kg/m   Estimated body mass index is 27.93 kg/m  as calculated from the following:    Height as of 21: 1.524 m (5').    Weight as of this encounter: 64.9 kg (143 lb).  BP completed using cuff size: regular    Questioned patient about current smoking habits.  Pt. has never smoked.          The following HM Due: pap smear      The following patient reported/Care Every where data was sent to:  P ABSTRACT QUALITY INITIATIVES [65000]        Iesha Garcia, [facility]

## 2022-06-03 NOTE — PATIENT INSTRUCTIONS
You can reach your [facility] Care Team any time of the day by calling [phone]. This number will put you in touch with the 24 hour nurse line if the clinic is closed.    To contact your OB/GYN Surgery Scheduler please call [phone]. This is a direct number for your care team between 8 a.m. and 4 p.m. Monday through Friday.    [facility] is open for your convenience: [phone]  Monday through Friday 8 a.m. to 8:30 p.m.  Saturday 9 a.m. to 6 p.m.  Sunday Noon to 6 p.m.    They are closed on all major holidays.

## 2022-06-06 NOTE — PROGRESS NOTES
HPI:  Sloane Aguilar is a 46 year old white female  ,tubal ligation for contraception who presents for an annual exam and pap.  She is otherwise doing well without concerns.  She is having some occasional hot flashes.  The patient has had an ablation and has no bleeding.  We discussed menopause today and the appropriate screening for this.  Patient also has some irritation in her underarms following the use of her antiperspirant.  She has not used her antiperspirant for a few days and finds that her symptoms are better  Self breast exam,  ACS screening mammogram recs,lipid screening, colon cancer screening recs and Dexa scan recs thoroughly reveiwed.      Past Medical History:   Diagnosis Date     Abnormal cervical Papanicolaou smear 2018    see problem list     anxiety      Cervical high risk HPV (human papillomavirus) test positive 2018    See problem list     History of colposcopy 2018     Past Surgical History:   Procedure Laterality Date     HC TOOTH EXTRACTION W/FORCEP       HERNIA REPAIR, UMBILICAL  2007     LEEP TX, CERVICAL  ,     LEEP TX Cervical     TUBAL LIGATION  2007    ablation     ZZC APPENDECTOMY       Family History   Problem Relation Age of Onset     Hypertension Mother      Cancer Mother         uterine dx 2012     Heart Disease Mother      Heart Disease Father      Heart Disease Brother 48        stint     Social History     Socioeconomic History     Marital status:      Spouse name: Not on file     Number of children: Not on file     Years of education: Not on file     Highest education level: Not on file   Occupational History     Occupation:      Comment: results title   Tobacco Use     Smoking status: Never Smoker     Smokeless tobacco: Never Used   Vaping Use     Vaping Use: Never used   Substance and Sexual Activity     Alcohol use: Yes     Alcohol/week: 0.0 standard drinks     Comment: 2 wine several x weekly     Drug  use: No     Sexual activity: Yes     Partners: Male     Birth control/protection: Surgical, Female Surgical     Comment: TL   Other Topics Concern     Parent/sibling w/ CABG, MI or angioplasty before 65F 55M? Yes     Comment: brother stint at 48   Social History Narrative     Not on file     Social Determinants of Health     Financial Resource Strain: Not on file   Food Insecurity: Not on file   Transportation Needs: Not on file   Physical Activity: Not on file   Stress: Not on file   Social Connections: Not on file   Intimate Partner Violence: Not on file   Housing Stability: Not on file       Allergies:  Formaldehyde    Current Outpatient Medications   Medication Sig Dispense Refill     aluminum chloride (DRYSOL) 20 % external solution Use daily as directed 35 mL prn     clotrimazole-betamethasone (LOTRISONE) 1-0.05 % external cream Apply topically 2 times daily 30 g 1     escitalopram (LEXAPRO) 20 MG tablet TAKE 1 TABLET(20 MG) BY MOUTH DAILY 90 tablet 0     traZODone (DESYREL) 50 MG tablet Take 1-2 tablets ( mg) by mouth nightly as needed for sleep (Patient taking differently: Take  mg by mouth as needed for sleep) 180 tablet 1     valACYclovir (VALTREX) 500 MG tablet TAKE 1 TABLET(500 MG) BY MOUTH DAILY 30 tablet 1       ROS: ROS: 10 point ROS neg other than the symptoms noted above in the HPI.    EXAM:  Vitals: /72   Wt 64.9 kg (143 lb)   BMI 27.93 kg/m    BMI= Body mass index is 27.93 kg/m .  Constitutional: healthy, alert and no distress  Head: Normocephalic. No masses, lesions, tenderness or abnormalities  Neck: Neck supple. No adenopathy. Thyroid symmetric, normal size,, Carotids without bruits.  ENT: NEGATIVE for ear, mouth and throat problems  Breast:  breasts symmetric, no dominant or suspicious mass, no skin or nipple changes, no axillary adenopathy, unchanged from previous exam or self exam in taught and encouraged  Cardiovascular: negative, PMI normal. No lifts, heaves, or  thrills. RRR. No murmurs, clicks gallops or rub  Respiratory: negative, Percussion normal. Good diaphragmatic excursion. Lungs clear  Gastrointestinal: Abdomen soft, non-tender. BS normal. No masses, organomegaly  Genitourinary: Pelvic Exam:  External Genitalia:     Normal appearance for age, no discharge present, no tenderness present, no inflammatory lesions present, color normal  Vagina:     Normal vaginal vault without central or paravaginal defects, no discharge present, no inflammatory lesions present, no masses present  Bladder:     Nontender to palpation  Urethra:   Urethral Body:  Urethra palpation normal, urethra structural support normal   Urethral Meatus:  No erythema or lesions present  Cervix:     Appearance healthy, no lesions present, nontender to palpation, no bleeding present  Uterus:     Uterus: firm, normal sized and nontender, midplane in position.   Adnexa:     No adnexal tenderness present, no adnexal masses present  Perineum:     Perineum within normal limits, no evidence of trauma, no rashes or skin lesions present  Anus:     Anus within normal limits, no hemorrhoids present  Inguinal Lymph Nodes:     No lymphadenopathy present  Pubic Hair:     Normal pubic hair distribution for age  Genitalia and Groin:     No rashes present, no lesions present, no areas of discoloration, no masses present    Musculoskeletalextremities normal- no gross deformities noted, gait normal and normal muscle tone  Integument: no suspicious lesions or rashes  Neurologic: Gait normal. Reflexes normal and symmetric. Sensation grossly WNL.  Psychiatric: mentation appears normal and affect normal/bright  Hematologic/Lymphatic/Immunologic: Normal cervical lymph nodes     ASSESSMENT:/PLAN:  (N87.1) Dysplasia of cervix, high grade NICK 2  (primary encounter diagnosis)  Comment: Past history reviewed recommendations reviewed  Plan: Pap diagnostic with HPV        done with cotesting    (Z01.411) Encounter for gynecological  examination with abnormal finding  Comment: Other than above health concerns normal exam  Plan: Return 1 year or as needed    (R87.810) Cervical high risk HPV (human papillomavirus) test positive  Comment: Recommendations reviewed  Plan: Pap diagnostic with HPV        done    (N95.1) Menopausal syndrome (hot flashes)  Comment: Pathophysiology of menopause discussed at length.  Plan: Follicle stimulating hormone        I will check an FSH today with appropriate therapy to follow    (Z13.6) CARDIOVASCULAR SCREENING; LDL GOAL LESS THAN 100  Comment: Previous results reviewed  Plan: Lipid panel reflex to direct LDL Fasting,         Comprehensive metabolic panel (BMP + Alb, Alk         Phos, ALT, AST, Total. Bili, TP)        order placed for fasting lipid panel and comprehensive metabolic panel in addition to the FSH with appropriate therapy based on results      Hnay Ovalles M.D.

## 2022-06-07 LAB
BKR LAB AP GYN ADEQUACY: NORMAL
BKR LAB AP GYN INTERPRETATION: NORMAL
BKR LAB AP HPV REFLEX: NORMAL
BKR LAB AP PREVIOUS ABNL DX: NORMAL
BKR LAB AP PREVIOUS ABNORMAL: NORMAL
PATH REPORT.COMMENTS IMP SPEC: NORMAL
PATH REPORT.COMMENTS IMP SPEC: NORMAL
PATH REPORT.RELEVANT HX SPEC: NORMAL

## 2022-06-09 LAB
HUMAN PAPILLOMA VIRUS 16 DNA: NEGATIVE
HUMAN PAPILLOMA VIRUS 18 DNA: NEGATIVE
HUMAN PAPILLOMA VIRUS FINAL DIAGNOSIS: NORMAL
HUMAN PAPILLOMA VIRUS OTHER HR: NEGATIVE

## 2022-06-10 ENCOUNTER — PATIENT OUTREACH (OUTPATIENT)
Dept: OBGYN | Facility: CLINIC | Age: 47
End: 2022-06-10
Payer: COMMERCIAL

## 2022-06-10 NOTE — TELEPHONE ENCOUNTER
6/3/22 NIL Pap, Neg HPV. Plan cotest in 1 year due by 6/3/23   Progress Note - Linwood Mazariegos 1948, 66 y o  male MRN: [id number]    Unit/Bed#: -[facility] Encounter: [id number]    Primary Care Provider: Cynthia Carpenter MD   Date and time admitted to hospital: 6/25/2020  6:10 PM        Lactic acidosis  Assessment & Plan  · Elevated at 2 2  · Trend to normal  · Fluids contraindicated in setting of CHF exacerbation    Acute metabolic encephalopathy  Assessment & Plan  · Son at bedside reports his follows been a little bit more confused today, he does have baseline confusion  · ? Etiology likely Cellulitis  · CT head within normal limits  · Monitor mental status closely    Atrial fibrillation [facility])  Assessment & Plan   Controlled at this time  Rates in the 80s   Rate/rhythm control: lopressor   Anticoagulation: eliquis   Monitor heart rate  Elevated troponin level not due myocardial infarction  Assessment & Plan  · Troponin 0 15, likely non-MI troponin elevation  No active CP  · Trend trops, monitor on tele  · Cardiology consult appreciated  · Denies any chest pain     Acute kidney injury superimposed on CKD [facility])  Assessment & Plan  Creatinine upon admission: 2 29  Baseline: around 1 3  Secondary to prerenal overload  Creatinine this morning is 2 13  Treatment: diuretics, monitor I/O  On Bumex drip  Monitor renal function while diuresing  Nephrology recommendations appreciated  Avoid hypotension/nephrotoxins medications    Cellulitis of left lower extremity  Assessment & Plan  · When reviewing clinical images from the 14th of June, his left leg does appear to be more swollen and erythematous  · Continue on IV Ancef q 8 hours and Flagyl  · Trend WBC and fever curve  · Podiatry follow-up noted  · Wound care and dressing changes per their recommendations  · Patient had wound debridement done by Podiatry on 06/27 at bedside    Essential hypertension  Assessment & Plan   Continue lopressor    On Bumex drip   Monitor vitals as per protocol    * Acute on chronic combined systolic and diastolic congestive heart failure [facility])  Assessment & Plan  Wt Readings from Last 3 Encounters:   06/28/20 122 kg (268 lb 15 4 oz)   06/18/20 127 kg (279 lb 1 6 oz)   03/10/20 114 kg (251 lb 8 7 oz)      Chest x-ray: NAD   Last echocardiogram on file: EF 45%, November 2019   Initial troponin: 0 15  o Will trend x3 or to peak   BNP: 35,000   Patient was switched to Bumex drip on 02/26   Continue on Beta-blocker: lopressor   Monitor intake and output, daily weights   Diet: Fluid restriction and 2 g Sodium   Monitor renal function while diuresing   Cardiology and nephrology on board   Oxygen supplementation p r n    Monitor renal function while diuresing      Labs & Imaging: I have personally reviewed pertinent reports  VTE Pharmacologic Prophylaxis:  Eliquis  VTE Mechanical Prophylaxis: sequential compression device    Code Status:   Level 1 - Full Code    Patient Centered Rounds: I have performed bedside rounds with nursing staff today  Discussions with Specialists or Other Care Team Provider:  Podiatry    Education and Discussions with Family / Patient:  Son at bedside    Current Length of Stay: 3 day(s)    Current Patient Status: Inpatient   Certification Statement: The patient will continue to require additional inpatient hospital stay due to see my assessment and plan  Subjective:   Patient is seen and examined at bedside  Complains of left lower extremity pain since debridement  Denies any chest pain or shortness of breath  Lower extremity swelling is much improved  Afebrile  All other ROS are negative  Objective:    Vitals: Blood pressure 111/68, pulse 72, temperature (!) 96 5 °F (35 8 °C), resp  rate 17, height 6' (1 829 m), weight 122 kg (268 lb 15 4 oz), SpO2 100 %  ,Body mass index is 36 48 kg/m²    SPO2 RA Rest      ED to Hosp-Admission (Current) from 6/25/2020 in [address] [facility]   SpO2  100 %   SpO2 Activity  At Rest   O2 Device None (Room air)   O2 Flow Rate          I&O:     Intake/Output Summary (Last 24 hours) at 6/28/2020 1207  Last data filed at 6/28/2020 1111  Gross per 24 hour   Intake 180 ml   Output 2710 ml   Net -2530 ml       Physical Exam:    General- Alert, lying comfortably in bed  Not in any acute distress  HEENT- SERGIO, EOM intact  CVS- regular, S1 and S2 normal  Chest- Bilateral Air entry, decreased at bases  Abdomen- soft, nontender, not distended, no guarding or rigidity, BS+  Extremities-  has pedal edema, No calf tenderness  Chronic venous stasis changes and left lower extremity/foot in a dressing  CNS-   Alert, awake and orientedx3  No focal deficits present      Invasive Devices     Peripheral Intravenous Line            Peripheral IV 06/25/20 Left Antecubital 2 days    Peripheral IV 06/26/20 Right Forearm 1 day                      Social History  reviewed  Family History   Problem Relation Age of Onset    Cancer Mother         cancer of uterus    Diabetes Sister     Mental illness Neg Hx         neg fam hx    Substance Abuse Neg Hx         neg fam hx    reviewed    Meds:  Current Facility-Administered Medications   Medication Dose Route Frequency Provider Last Rate Last Dose    acetaminophen (TYLENOL) tablet 650 mg  650 mg Oral Q6H PRN Makayla Doyle, PA-C   650 mg at 06/28/20 1101    apixaban (ELIQUIS) tablet 5 mg  5 mg Oral BID Makaylachantel Doyle PA-C   5 mg at 06/28/20 9759    ascorbic acid (VITAMIN C) tablet 1,000 mg  1,000 mg Oral BID Makayla Yanira, PA-C   1,000 mg at 06/28/20 0291    bumetanide (BUMEX) 12 5 mg infusion 50 mL  0 5 mg/hr Intravenous Continuous Janes Braleigh ann, CRNP 2 mL/hr at 06/28/20 0645 0 5 mg/hr at 06/28/20 0645    ceFAZolin (ANCEF) IVPB (premix) 2,000 mg 50 mL  2,000 mg Intravenous Q8H Makayla Doyle, PA-C   Stopped at 06/28/20 0430    cholecalciferol (VITAMIN D3) tablet 1,000 Units  1,000 Units Oral Daily Makayla Doyle PA-C   1,000 Units at 06/28/20 0924    collagenase (SANTYL) ointment   Topical Daily Tootie Right, DPM        DULoxetine (CYMBALTA) delayed release capsule 30 mg  30 mg Oral Daily Hoang Ghotra PA-C   30 mg at 06/28/20 0932    metoprolol tartrate (LOPRESSOR) tablet 25 mg  25 mg Oral Q12H [address] Hoang Ghotra PA-C   25 mg at 06/28/20 6288    metroNIDAZOLE (FLAGYL) IVPB (premix) 500 mg 100 mL  500 mg Intravenous Q8H Cordell Morocho  mL/hr at 06/28/20 0925 500 mg at 06/28/20 0925    midodrine (PROAMATINE) tablet 2 5 mg  2 5 mg Oral TID AC NAVNEET Renee   2 5 mg at 06/28/20 4343    ondansetron (ZOFRAN) injection 4 mg  4 mg Intravenous Q6H PRN Hoang Ghotra PA-C        oxyCODONE (ROXICODONE) IR tablet 5 mg  5 mg Oral Q6H PRN Cordell Morocho MD        pantoprazole (PROTONIX) EC tablet 40 mg  40 mg Oral Daily Hoang Ghotra PA-C   40 mg at 06/28/20 2321    potassium chloride (K-DUR,KLOR-CON) CR tablet 40 mEq  40 mEq Oral BID NAVNEET Ruth   40 mEq at 06/28/20 6472    senna (SENOKOT) tablet 17 2 mg  2 tablet Oral Daily Hoang Ghotra PA-C   17 2 mg at 06/28/20 7762      Medications Prior to Admission   Medication    apixaban (ELIQUIS) 5 mg    Ascorbic Acid (VITAMIN C) 1000 MG tablet    bumetanide (BUMEX) 1 mg tablet    cholecalciferol (VITAMIN D3) 1,000 units tablet    DULoxetine (CYMBALTA) 30 mg delayed release capsule    metoprolol tartrate (LOPRESSOR) 25 mg tablet    midodrine (PROAMATINE) 5 mg tablet    pantoprazole (PROTONIX) 40 mg tablet    senna (SENOKOT) 8 6 mg    sodium chloride (OCEAN) 0 65 % nasal spray       Labs:  Results from last 7 days   Lab Units 06/27/20  0625 06/26/20  0454 06/25/20  1842   WBC Thousand/uL 6 44 5 63 6 51   HEMOGLOBIN g/dL 13 6 13 9 13 5   HEMATOCRIT % 41 0 42 2 40 1   PLATELETS Thousands/uL 121* 117* 118*   NEUTROS PCT % 61 53 59   LYMPHS PCT % 20 24 20   MONOS PCT % 14* 17* 18*   EOS PCT % 4 5 2     Results from last 7 days   Lab Units 06/28/20  0340 06/27/20  0625 06/26/20  0454 06/25/20  1842   POTASSIUM mmol/L 4 4 4 1 3 5 3 7   CHLORIDE mmol/L 101 101 100 101   CO2 mmol/L 26 26 24 24   BUN mg/dL 57* 56* 61* 63*   CREATININE mg/dL 2 13* 2 07* 2 15* 2 29*   CALCIUM mg/dL 9 0 8 9 9 5 9 3   ALK PHOS U/L  --  65  --  83   ALT U/L  --  16  --  23   AST U/L  --  34  --  49*     Lab Results   Component Value Date    TROPONINI 0 10 (H) 06/27/2020    TROPONINI 0 12 (H) 06/26/2020    TROPONINI 0 15 (H) 06/25/2020    CKMB 11 1 (H) 06/14/2020    CKMB 12 8 (H) 06/13/2020    CKMB 13 3 (H) 06/12/2020    CKTOTAL 319 (H) 06/14/2020    CKTOTAL 431 (H) 06/13/2020    CKTOTAL 492 (H) 06/12/2020     Results from last 7 days   Lab Units 06/25/20  1842   INR  2 09*     Lab Results   Component Value Date    BLOODCX No Growth at 48 hrs  06/25/2020    BLOODCX No Growth at 48 hrs  06/25/2020    BLOODCX No Growth After 5 Days  06/14/2020    URINECX <10,000 cfu/ml  02/19/2020    WOUNDCULT 2+ Growth of Serratia marcescens (A) 03/05/2020    WOUNDCULT 4+ Growth of Serratia marcescens (A) 03/02/2020    WOUNDCULT 3+ Growth of Serratia marcescens (A) 07/24/2019    WOUNDCULT 1+ Growth of  07/24/2019         Imaging:  Results for orders placed during the hospital encounter of 06/25/20   XR chest portable    Narrative CHEST     INDICATION:   Lower extremity swelling  COMPARISON:  6/14/2020    EXAM PERFORMED/VIEWS:  XR CHEST PORTABLE      FINDINGS:    Stable severe cardiomegaly  No pleural effusion  No airspace consolidation, pulmonary edema, atelectasis or pneumothorax  Limited evaluation bones and soft tissues  Impression No acute cardiopulmonary disease  Workstation performed: [id number]       Results for orders placed during the hospital encounter of 06/11/20   XR chest pa & lateral    Narrative CHEST     INDICATION:   sob  COMPARISON:  6/11/2020    EXAM PERFORMED/VIEWS:  XR CHEST PA & LATERAL      FINDINGS:    Cardiomediastinal silhouette remains enlarged    Slight vascular congestion and slight costophrenic angle blunting  No pneumothorax  Osseous structures appear within normal limits for patient age  Impression Cardiomegaly  Slight vascular congestion  Trace effusions  Workstation performed: [id number]         Last 24 Hours Medication List:     Current Facility-Administered Medications:  acetaminophen 650 mg Oral Q6H PRN Jule Koyanagi, PA-C    apixaban 5 mg Oral BID Jule Koyanagi, PA-C    vitamin C 1,000 mg Oral BID Jule Koyanagi, PA-C    bumetanide 0 5 mg/hr Intravenous Continuous NAVNEET Zeng Last Rate: 0 5 mg/hr (06/28/20 0645)   cefazolin 2,000 mg Intravenous Q8H Jule Koyanagi, PA-C Last Rate: Stopped (06/28/20 0430)   cholecalciferol 1,000 Units Oral Daily Jule Koyanagi, PA-C    collagenase  Topical Daily Nolon Adjutant, DPM    DULoxetine 30 mg Oral Daily Jule Koyanagi, PA-C    metoprolol tartrate 25 mg Oral Q12H [facility] Jule Koyanagi, PA-C    metroNIDAZOLE 500 mg Intravenous Q8H Arabella Pugh MD Last Rate: 500 mg (06/28/20 0925)   midodrine 2 5 mg Oral TID AC NAVNEET Renee    ondansetron 4 mg Intravenous Q6H PRN Jule Koyanagi, PA-C    oxyCODONE 5 mg Oral Q6H PRN Arabella Pugh MD    pantoprazole 40 mg Oral Daily Jule Koyanagi, PA-C    potassium chloride 40 mEq Oral BID NAVNEET Zeng    senna 2 tablet Oral Daily Jule Koyanagi, PA-C         Today, Patient Was Seen By: Arabella Pugh MD    ** Please Note: Dictation voice to text software may have been used in the creation of this document   **

## 2022-07-06 DIAGNOSIS — F43.23 ADJUSTMENT DISORDER WITH MIXED ANXIETY AND DEPRESSED MOOD: ICD-10-CM

## 2022-07-11 RX ORDER — ESCITALOPRAM OXALATE 20 MG/1
TABLET ORAL
Qty: 90 TABLET | Refills: 0 | OUTPATIENT
Start: 2022-07-11

## 2022-07-11 NOTE — TELEPHONE ENCOUNTER
Routing refill request to provider for review/approval because:  Patient needs to be seen because:  visit alert  Failed PHQ 9  Tamiko Hernández RN, BSN  [facility]

## 2022-07-18 ENCOUNTER — VIRTUAL VISIT (OUTPATIENT)
Dept: FAMILY MEDICINE | Facility: CLINIC | Age: 47
End: 2022-07-18
Payer: COMMERCIAL

## 2022-07-18 DIAGNOSIS — F41.9 ANXIETY: Primary | ICD-10-CM

## 2022-07-18 DIAGNOSIS — F14.21 HISTORY OF COCAINE DEPENDENCE (H): ICD-10-CM

## 2022-07-18 DIAGNOSIS — Z12.11 SCREEN FOR COLON CANCER: ICD-10-CM

## 2022-07-18 PROCEDURE — 99213 OFFICE O/P EST LOW 20 MIN: CPT | Mod: 95 | Performed by: PHYSICIAN ASSISTANT

## 2022-07-18 RX ORDER — ESCITALOPRAM OXALATE 20 MG/1
20 TABLET ORAL DAILY
Qty: 90 TABLET | Refills: 3 | Status: SHIPPED | OUTPATIENT
Start: 2022-07-18 | End: 2023-09-06

## 2022-07-18 ASSESSMENT — ANXIETY QUESTIONNAIRES
IF YOU CHECKED OFF ANY PROBLEMS ON THIS QUESTIONNAIRE, HOW DIFFICULT HAVE THESE PROBLEMS MADE IT FOR YOU TO DO YOUR WORK, TAKE CARE OF THINGS AT HOME, OR GET ALONG WITH OTHER PEOPLE: NOT DIFFICULT AT ALL
4. TROUBLE RELAXING: NOT AT ALL
GAD7 TOTAL SCORE: 0
6. BECOMING EASILY ANNOYED OR IRRITABLE: NOT AT ALL
1. FEELING NERVOUS, ANXIOUS, OR ON EDGE: NOT AT ALL
5. BEING SO RESTLESS THAT IT IS HARD TO SIT STILL: NOT AT ALL
2. NOT BEING ABLE TO STOP OR CONTROL WORRYING: NOT AT ALL
GAD7 TOTAL SCORE: 0
3. WORRYING TOO MUCH ABOUT DIFFERENT THINGS: NOT AT ALL
7. FEELING AFRAID AS IF SOMETHING AWFUL MIGHT HAPPEN: NOT AT ALL

## 2022-07-18 NOTE — PROGRESS NOTES
Sloane is a 46 year old who is being evaluated via a billable video visit.      How would you like to obtain your AVS? MyChart  If the video visit is dropped, the invitation should be resent by: Text to cell phone: [phone]  Will anyone else be joining your video visit? No      Assessment & Plan     Anxiety  Chronic, well controlled. Has been stable for many years. No side effects or concerns. Continue present management.  - escitalopram (LEXAPRO) 20 MG tablet; Take 1 tablet (20 mg) by mouth daily    Screen for colon cancer  - Colonscopy Screening  Referral; Future    History of cocaine dependence (H)  Sober, no concerns.     Return in about 1 year (around 7/18/2023) for Med Check, Mood Recheck, Preventive Physical Exam.    Jaqueline Goyal PA-C  [facility]   Sloane is a 46 year old, presenting for the following health issues:  Recheck Medication      HPI     Medication Followup of escitalopram     Taking Medication as prescribed: yes    Side Effects:  None    Medication Helping Symptoms:  yes    Anxiety Follow-Up    How are you doing with your anxiety since your last visit? No change    Are you having other symptoms that might be associated with anxiety? Yes:  PT states she may be going through menopause    Have you had a significant life event? No     Are you feeling depressed? No    Do you have any concerns with your use of alcohol or other drugs? No    Social History     Tobacco Use     Smoking status: Never Smoker     Smokeless tobacco: Never Used   Vaping Use     Vaping Use: Never used   Substance Use Topics     Alcohol use: Yes     Alcohol/week: 0.0 standard drinks     Comment: 2 wine several x weekly     Drug use: No     TAL-7 SCORE 10/9/2019 1/4/2021 7/18/2022   Total Score - - -   Total Score 0 0 0     PHQ 12/26/2017 5/9/2018 1/4/2021   PHQ-9 Total Score 0 0 0   Q9: Thoughts of better off dead/self-harm past 2 weeks Not at all Not at all Not at all      Last PHQ-9 1/4/2021   1.  Little interest or pleasure in doing things 0   2.  Feeling down, depressed, or hopeless 0   3.  Trouble falling or staying asleep, or sleeping too much 0   4.  Feeling tired or having little energy 0   5.  Poor appetite or overeating 0   6.  Feeling bad about yourself 0   7.  Trouble concentrating 0   8.  Moving slowly or restless 0   Q9: Thoughts of better off dead/self-harm past 2 weeks 0   PHQ-9 Total Score 0   Difficulty at work, home, or with people Not difficult at all     TAL-7  7/18/2022   1. Feeling nervous, anxious, or on edge 0   2. Not being able to stop or control worrying 0   3. Worrying too much about different things 0   4. Trouble relaxing 0   5. Being so restless that it is hard to sit still 0   6. Becoming easily annoyed or irritable 0   7. Feeling afraid, as if something awful might happen 0   TAL-7 Total Score 0   If you checked any problems, how difficult have they made it for you to do your work, take care of things at home, or get along with other people? Not difficult at all         How many servings of fruits and vegetables do you eat daily?  0-1    On average, how many sweetened beverages do you drink each day (Examples: soda, juice, sweet tea, etc.  Do NOT count diet or artificially sweetened beverages)?   1    How many days per week do you exercise enough to make your heart beat faster? 3 or less    How many minutes a day do you exercise enough to make your heart beat faster? 30 - 60  How many days per week do you miss taking your medication? 1    What makes it hard for you to take your medications?  remembering to take    History of anxiety, takes lexapro 20 mg daily with good control. Has been stable on this dose for several years. No side effects. No SI/HI.    She has been having hot flashes - saw OB/GYN recently and is checking labs later this week.    Review of Systems   Constitutional, HEENT, cardiovascular, pulmonary, gi and gu systems are negative,  except as otherwise noted.      Objective           Vitals:  No vitals were obtained today due to virtual visit.    Physical Exam   GENERAL: Healthy, alert and no distress  EYES: Eyes grossly normal to inspection.  No discharge or erythema, or obvious scleral/conjunctival abnormalities.  RESP: No audible wheeze, cough, or visible cyanosis.  No visible retractions or increased work of breathing.    SKIN: Visible skin clear. No significant rash, abnormal pigmentation or lesions.  NEURO: Cranial nerves grossly intact.  Mentation and speech appropriate for age.  PSYCH: Mentation appears normal, affect normal/bright, judgement and insight intact, normal speech and appearance well-groomed.    Video-Visit Details    Video Start Time: 3:08 PM    Type of service:  Video Visit    Video End Time: 3:13 PM    Originating Location (pt. Location): Home    Distant Location (provider location):  [facility]     Platform used for Video Visit: [employer]    Manjit Singh

## 2022-07-19 DIAGNOSIS — B00.9 RECURRENT HSV (HERPES SIMPLEX VIRUS): ICD-10-CM

## 2022-07-19 RX ORDER — VALACYCLOVIR HYDROCHLORIDE 500 MG/1
500 TABLET, FILM COATED ORAL DAILY
Qty: 30 TABLET | Refills: 1 | Status: SHIPPED | OUTPATIENT
Start: 2022-07-19 | End: 2022-09-21

## 2022-07-19 NOTE — TELEPHONE ENCOUNTER
valacyclovir      Last Written Prescription Date:  3/7/22  Last Fill Quantity: 30,   # refills: 1  Last Office Visit: 6/3/22  Future Office visit:

## 2022-07-20 ENCOUNTER — LAB (OUTPATIENT)
Dept: LAB | Facility: CLINIC | Age: 47
End: 2022-07-20
Payer: COMMERCIAL

## 2022-07-20 DIAGNOSIS — N95.1 MENOPAUSAL SYNDROME (HOT FLASHES): ICD-10-CM

## 2022-07-20 DIAGNOSIS — Z13.6 CARDIOVASCULAR SCREENING; LDL GOAL LESS THAN 100: ICD-10-CM

## 2022-07-20 LAB
ALBUMIN SERPL-MCNC: 4.1 G/DL (ref 3.4–5)
ALP SERPL-CCNC: 93 U/L (ref 40–150)
ALT SERPL W P-5'-P-CCNC: 44 U/L (ref 0–50)
ANION GAP SERPL CALCULATED.3IONS-SCNC: 5 MMOL/L (ref 3–14)
AST SERPL W P-5'-P-CCNC: 31 U/L (ref 0–45)
BILIRUB SERPL-MCNC: 0.4 MG/DL (ref 0.2–1.3)
BUN SERPL-MCNC: 11 MG/DL (ref 7–30)
CALCIUM SERPL-MCNC: 9.1 MG/DL (ref 8.5–10.1)
CHLORIDE BLD-SCNC: 103 MMOL/L (ref 94–109)
CHOLEST SERPL-MCNC: 244 MG/DL
CO2 SERPL-SCNC: 29 MMOL/L (ref 20–32)
CREAT SERPL-MCNC: 0.78 MG/DL (ref 0.52–1.04)
FASTING STATUS PATIENT QL REPORTED: YES
FSH SERPL IRP2-ACNC: 70 MIU/ML
GFR SERPL CREATININE-BSD FRML MDRD: >90 ML/MIN/1.73M2
GLUCOSE BLD-MCNC: 98 MG/DL (ref 70–99)
HDLC SERPL-MCNC: 121 MG/DL
LDLC SERPL CALC-MCNC: 108 MG/DL
NONHDLC SERPL-MCNC: 123 MG/DL
POTASSIUM BLD-SCNC: 4 MMOL/L (ref 3.4–5.3)
PROT SERPL-MCNC: 7.8 G/DL (ref 6.8–8.8)
SODIUM SERPL-SCNC: 137 MMOL/L (ref 133–144)
TRIGL SERPL-MCNC: 74 MG/DL

## 2022-07-20 PROCEDURE — 80061 LIPID PANEL: CPT

## 2022-07-20 PROCEDURE — 83001 ASSAY OF GONADOTROPIN (FSH): CPT

## 2022-07-20 PROCEDURE — 80053 COMPREHEN METABOLIC PANEL: CPT

## 2022-07-20 PROCEDURE — 36415 COLL VENOUS BLD VENIPUNCTURE: CPT

## 2022-08-15 ENCOUNTER — TELEPHONE (OUTPATIENT)
Dept: OBGYN | Facility: CLINIC | Age: 47
End: 2022-08-15

## 2022-08-15 DIAGNOSIS — N95.1 MENOPAUSAL SYNDROME (HOT FLASHES): Primary | ICD-10-CM

## 2022-08-15 NOTE — TELEPHONE ENCOUNTER
Patient calling;  Having an increase in hot flashes/night sweats, worse in last few months.  Waking up at night and only getting an hour of sleep at a time.  Trazodone, cooling blankets not helping.  Hard to function when up every hr.    Please advise.  [phone]  Shawanda Castro RN

## 2022-08-22 NOTE — TELEPHONE ENCOUNTER
I left a message for the pt to return my call to discuss her menopausal sx's unrelieved with conservative measures.  This pt is s/p TL and endometrial ablation and may be a candidate for a short course of HRT  Hany Ovalles MD

## 2022-08-23 RX ORDER — ESTRADIOL AND NORETHINDRONE ACETATE .5; .1 MG/1; MG/1
1 TABLET ORAL DAILY
Qty: 60 TABLET | Refills: 0 | Status: SHIPPED | OUTPATIENT
Start: 2022-08-23 | End: 2022-08-24

## 2022-08-23 NOTE — TELEPHONE ENCOUNTER
I spoke to the patient today regarding symptoms of hot flashes night sweats and vaginal dryness that she is experiencing.  The patient has tried multiple over-the-counter nonhormonal regimens which have not provided her with adequate relief.  The patient states that she is not sleeping well at night which makes her next day very difficult.  We discussed the risk benefits and alternative forms of therapy.  We discussed women's health study we made a decision to begin hormone replacement therapy.  I left her prescription for an estrogen progestin combination 1 tablet daily for 2 months.  She is on call me in a month and let me know how this works for her.  She will call for any concerns that occur in the meantime or if she has any vaginal bleeding.  She has had a previous endometrial ablation and tubal ligation done

## 2022-08-24 RX ORDER — ESTRADIOL AND NORETHINDRONE ACETATE .5; .1 MG/1; MG/1
1 TABLET ORAL DAILY
Qty: 90 TABLET | Refills: 0 | Status: SHIPPED | OUTPATIENT
Start: 2022-08-24 | End: 2022-11-18

## 2022-09-23 ENCOUNTER — TELEPHONE (OUTPATIENT)
Dept: GASTROENTEROLOGY | Facility: CLINIC | Age: 47
End: 2022-09-23

## 2022-09-23 NOTE — TELEPHONE ENCOUNTER
Screening Questions    BlueKIND OF PREP RedLOCATION [review exclusion criteria] GreenSEDATION TYPE        Y Are you active on mychart?   Jaqueline Goyal PA-C  Ordering/Referring Provider?    MEDICA What type of coverage do you have?  N Have you had a positive covid test in the last 90 days?     1. BMI  [BMI 40+ - review exclusion criteria]  27.3    2. Are you able to give consent for your medical care? [IF NO,RN REVIEW]  SELF           3. Are you taking any prescription pain medications on a routine schedule?    N       3a. N EXTENDED PREP What kind of prescription?   4. Do you have any chemical dependencies such as alcohol, street drugs, or methadone?  N     5. Do you have any history of post-traumatic stress syndrome, severe anxiety or history of psychosis?    N    **If yes 3- 5 , please schedule with MAC sedation.**    BMI OVER 40 NEED PAC EVALUATION FOR [facility]          IF YES TO ANY 6 - 10 - HOSPITAL SETTING ONLY.     6.   Do you need assistance transferring?    N   7.   Have you had a heart or lung transplant?    N  8.   Are you currently on dialysis?   N  9.  Do you use daily home oxygen?   N  10. Do you take nitroglycerin?   N  10a. N If yes, how often?      11. [FEMALES]  N Are you currently pregnant?    11a. N If yes, how many weeks? [ Greater than 12 weeks, OR NEEDED]    12. Do you have Pulmonary Hypertension? *NEED PAC APPT AT [facility]*   N    13. [review exclusion criteria]  Do you have any implantable devices in your body (pacemaker, defib, LVAD)?  N    14. In the past 6 months, have you had any heart related issues including cardiomyopathy or heart attack?   N    14a. N If yes, did it require cardiac stenting if so when?     15. Have you had a stroke or Transient ischemic attack (TIA - aka  mini stroke ) within 6 months?    N    16. Do you have mod to severe Obstructive Sleep Apnea?  [Hospital only - Ok at [place]]  N    17. Do you have SEVERE AND UNCONTROLLED asthma?   N  *NEED PAC APPT AT [facility]*  "    18. Are you currently taking any blood thinners?  N     18a. If yes, inform patient to \"follow up w/ ordering provider for bridging instructions.\"    19. Do you take the medication Phentermine?  N    19a. If yes, \"Hold for 7 days before procedure.  Please consult your prescribing provider if you have questions about holding this medication.\"     20.  Do you have chronic kidney disease?  N      21.  Do you have a diagnosis of diabetes?   N    22.  On a regular basis do you go 3-5 days between bowel movements?   N    23. Preferred LOCAL Pharmacy for Pre Prescription    [ LIST ONLY ONE PHARMACY]            [id number] - [place], [place] - [address] [facility] [address]      - CLOSING REMINDERS -    Informed patient they will need an adult    Cannot take any type of public or medical transportation alone  1. Conscious Sedation- Needs  for 6 hours after the procedure       MAC/General-Needs  for 24 hours after procedure    Pre-Procedure Covid test to be completed [[facility] PCR Testing Required]    Confirmed Nurse will call to complete assessment       - SCHEDULING DETAILS -     DEVAUGHN   Surgeon    11/21/2022  Date of Procedure  Type of Procedure Scheduled  Lower Endoscopy [Colonoscopy]     BV  Location  Which Colonoscopy Prep was Sent?  GOLYTELY PREP-If you answer yes to questions #8, #20, #21     CS  Sedation Type     N PAC / Pre-op Required         Additional comments:  N                "

## 2022-09-29 ENCOUNTER — OFFICE VISIT (OUTPATIENT)
Dept: FAMILY MEDICINE | Facility: CLINIC | Age: 47
End: 2022-09-29
Payer: COMMERCIAL

## 2022-09-29 VITALS
RESPIRATION RATE: 16 BRPM | HEART RATE: 91 BPM | TEMPERATURE: 98.3 F | SYSTOLIC BLOOD PRESSURE: 128 MMHG | WEIGHT: 150.25 LBS | OXYGEN SATURATION: 98 % | DIASTOLIC BLOOD PRESSURE: 80 MMHG | BODY MASS INDEX: 29.34 KG/M2

## 2022-09-29 DIAGNOSIS — H61.22 IMPACTED CERUMEN OF LEFT EAR: Primary | ICD-10-CM

## 2022-09-29 PROCEDURE — 69209 REMOVE IMPACTED EAR WAX UNI: CPT | Mod: LT | Performed by: FAMILY MEDICINE

## 2022-09-29 NOTE — PROGRESS NOTES
Assessment & Plan     Impacted cerumen of left ear  -    unilateral impacted cerumen.  Verbal consent obtained. Cerumen disimpacted using a combination of forced water irrigation, bionix lighted curette. Otoscopic Examination at conclusion of procedure confirms clean ear Canal and normal tympanic membrane.  - SD REMOVAL IMPACTED CERUMEN IRRIGATION/LVG UNILAT      {Provider  Link to [facility] Help Grid :[id number]}     See Patient Instructions    No follow-ups on file.    Yesika Wheeler MD  [facility]    Altaf Anton is a 47 year old, presenting for the following health issues:  Plugged Ears      History of Present Illness       Reason for visit:  Plugged ear  Symptom intensity:  Moderate  Symptom progression:  Staying the same  Had these symptoms before:  Yes  Has tried/received treatment for these symptoms:  Yes  Previous treatment was successful:  Yes    She eats 2-3 servings of fruits and vegetables daily.She consumes 1 sweetened beverage(s) daily.She exercises with enough effort to increase her heart rate 9 or less minutes per day.  She exercises with enough effort to increase her heart rate 3 or less days per week.   She is taking medications regularly.     Tried drops a few weeks ago with no improvement. Feels there is something in the ear.         Review of Systems   Constitutional, HEENT, cardiovascular, pulmonary, GI, , musculoskeletal, neuro, skin, endocrine and psych systems are negative, except as otherwise noted.      Objective    /80 (BP Location: Right arm, Patient Position: Chair, Cuff Size: Adult Regular)   Pulse 91   Temp 98.3  F (36.8  C) (Oral)   Resp 16   Wt 68.2 kg (150 lb 4 oz)   SpO2 98%   BMI 29.34 kg/m    Body mass index is 29.34 kg/m .  Physical Exam   GENERAL: healthy, alert and no distress  HENT: right ear: normal: no effusions, no erythema, normal landmarks and left ear: occluded with wax

## 2022-10-15 ENCOUNTER — HEALTH MAINTENANCE LETTER (OUTPATIENT)
Age: 47
End: 2022-10-15

## 2022-11-04 RX ORDER — BISACODYL 5 MG
TABLET, DELAYED RELEASE (ENTERIC COATED) ORAL
Qty: 4 TABLET | Refills: 0 | Status: SHIPPED | OUTPATIENT
Start: 2022-11-04 | End: 2023-07-11

## 2022-11-09 ENCOUNTER — TELEPHONE (OUTPATIENT)
Dept: GASTROENTEROLOGY | Facility: CLINIC | Age: 47
End: 2022-11-09

## 2022-11-09 NOTE — TELEPHONE ENCOUNTER
Caller: Sloane Aguilar    Procedure: Colonoscopy    Date, Location, and Surgeon of Procedure Cancelled: 11/21/22, [facility]    Ordering Provider:Jay Jay    Reason for cancel (please be detailed, any staff messages or encounters to note?): schedule conflict        Rescheduled: Y     If rescheduled:    Date: 01/23/23   Location:    Prep Resent: no(changes to prep?)   Covid Test Rescheduled: yes   Note any change or update to original order/sedation:

## 2022-11-18 DIAGNOSIS — N95.1 MENOPAUSAL SYNDROME (HOT FLASHES): ICD-10-CM

## 2022-11-18 RX ORDER — ESTRADIOL AND NORETHINDRONE ACETATE .5; .1 MG/1; MG/1
1 TABLET ORAL DAILY
Qty: 84 TABLET | Refills: 1 | Status: SHIPPED | OUTPATIENT
Start: 2022-11-18 | End: 2023-07-11

## 2022-11-18 NOTE — TELEPHONE ENCOUNTER
See the my chart message from today.   Pt states that the med is working well.  Prescription approved per [facility] Refill Protocol.  Jennifer JOHNS RN

## 2022-11-18 NOTE — TELEPHONE ENCOUNTER
Was advised to follow-up after starting this.  Sent mychart to see how this is working, will wait to fill.    Jess Cowan RN

## 2022-12-20 DIAGNOSIS — B00.9 RECURRENT HSV (HERPES SIMPLEX VIRUS): ICD-10-CM

## 2022-12-20 RX ORDER — VALACYCLOVIR HYDROCHLORIDE 500 MG/1
500 TABLET, FILM COATED ORAL DAILY
Qty: 30 TABLET | Refills: 1 | Status: SHIPPED | OUTPATIENT
Start: 2022-12-20 | End: 2023-03-28

## 2022-12-20 NOTE — TELEPHONE ENCOUNTER
valacyclovir      Last Written Prescription Date:  9/21/22  Last Fill Quantity: 80,   # refills: 1  Last Office Visit: 6/3/22  Future Office visit:    Next 5 appointments (look out 90 days)    Jan 20, 2023  2:00 PM  Testing Only with [place] [facility]  [facility] ([facility] ) [address]  [address]  [facility] [id number] [phone]

## 2023-01-12 RX ORDER — BISACODYL 5 MG
TABLET, DELAYED RELEASE (ENTERIC COATED) ORAL
Qty: 4 TABLET | Refills: 0 | Status: SHIPPED | OUTPATIENT
Start: 2023-01-12 | End: 2023-07-11

## 2023-01-23 ENCOUNTER — HOSPITAL ENCOUNTER (OUTPATIENT)
Facility: CLINIC | Age: 48
Discharge: HOME | End: 2023-01-23
Attending: INTERNAL MEDICINE | Admitting: INTERNAL MEDICINE
Payer: COMMERCIAL

## 2023-01-23 VITALS
OXYGEN SATURATION: 96 % | HEART RATE: 97 BPM | TEMPERATURE: 97.9 F | WEIGHT: 135 LBS | DIASTOLIC BLOOD PRESSURE: 79 MMHG | HEIGHT: 60 IN | BODY MASS INDEX: 26.5 KG/M2 | SYSTOLIC BLOOD PRESSURE: 117 MMHG | RESPIRATION RATE: 14 BRPM

## 2023-01-23 DIAGNOSIS — Z12.11 SCREEN FOR COLON CANCER: Primary | ICD-10-CM

## 2023-01-23 LAB — COLONOSCOPY: NORMAL

## 2023-01-23 PROCEDURE — G0121 COLON CA SCRN NOT HI RSK IND: HCPCS | Performed by: INTERNAL MEDICINE

## 2023-01-23 PROCEDURE — 45378 DIAGNOSTIC COLONOSCOPY: CPT | Performed by: INTERNAL MEDICINE

## 2023-01-23 PROCEDURE — 250N000011 HC RX IP 250 OP 636: Performed by: INTERNAL MEDICINE

## 2023-01-23 PROCEDURE — G0500 MOD SEDAT ENDO SERVICE >5YRS: HCPCS | Performed by: INTERNAL MEDICINE

## 2023-01-23 RX ORDER — FLUMAZENIL 0.1 MG/ML
0.2 INJECTION, SOLUTION INTRAVENOUS
Status: DISCONTINUED | OUTPATIENT
Start: 2023-01-23 | End: 2023-01-23 | Stop reason: HOSPADM

## 2023-01-23 RX ORDER — DIPHENHYDRAMINE HYDROCHLORIDE 50 MG/ML
25-50 INJECTION INTRAMUSCULAR; INTRAVENOUS
Status: DISCONTINUED | OUTPATIENT
Start: 2023-01-23 | End: 2023-01-23 | Stop reason: HOSPADM

## 2023-01-23 RX ORDER — ATROPINE SULFATE 0.1 MG/ML
1 INJECTION INTRAVENOUS
Status: DISCONTINUED | OUTPATIENT
Start: 2023-01-23 | End: 2023-01-23 | Stop reason: HOSPADM

## 2023-01-23 RX ORDER — SIMETHICONE 40MG/0.6ML
133 SUSPENSION, DROPS(FINAL DOSAGE FORM)(ML) ORAL
Status: DISCONTINUED | OUTPATIENT
Start: 2023-01-23 | End: 2023-01-23 | Stop reason: HOSPADM

## 2023-01-23 RX ORDER — NALOXONE HYDROCHLORIDE 0.4 MG/ML
0.4 INJECTION, SOLUTION INTRAMUSCULAR; INTRAVENOUS; SUBCUTANEOUS
Status: DISCONTINUED | OUTPATIENT
Start: 2023-01-23 | End: 2023-01-23 | Stop reason: HOSPADM

## 2023-01-23 RX ORDER — ONDANSETRON 2 MG/ML
4 INJECTION INTRAMUSCULAR; INTRAVENOUS EVERY 6 HOURS PRN
Status: DISCONTINUED | OUTPATIENT
Start: 2023-01-23 | End: 2023-01-23 | Stop reason: HOSPADM

## 2023-01-23 RX ORDER — NALOXONE HYDROCHLORIDE 0.4 MG/ML
0.2 INJECTION, SOLUTION INTRAMUSCULAR; INTRAVENOUS; SUBCUTANEOUS
Status: DISCONTINUED | OUTPATIENT
Start: 2023-01-23 | End: 2023-01-23 | Stop reason: HOSPADM

## 2023-01-23 RX ORDER — EPINEPHRINE 1 MG/ML
0.1 INJECTION, SOLUTION INTRAMUSCULAR; SUBCUTANEOUS
Status: DISCONTINUED | OUTPATIENT
Start: 2023-01-23 | End: 2023-01-23 | Stop reason: HOSPADM

## 2023-01-23 RX ORDER — LIDOCAINE 40 MG/G
CREAM TOPICAL
Status: DISCONTINUED | OUTPATIENT
Start: 2023-01-23 | End: 2023-01-23 | Stop reason: HOSPADM

## 2023-01-23 RX ORDER — ONDANSETRON 2 MG/ML
4 INJECTION INTRAMUSCULAR; INTRAVENOUS
Status: DISCONTINUED | OUTPATIENT
Start: 2023-01-23 | End: 2023-01-23 | Stop reason: HOSPADM

## 2023-01-23 RX ORDER — ONDANSETRON 4 MG/1
4 TABLET, ORALLY DISINTEGRATING ORAL EVERY 6 HOURS PRN
Status: DISCONTINUED | OUTPATIENT
Start: 2023-01-23 | End: 2023-01-23 | Stop reason: HOSPADM

## 2023-01-23 RX ORDER — PROCHLORPERAZINE MALEATE 10 MG
10 TABLET ORAL EVERY 6 HOURS PRN
Status: DISCONTINUED | OUTPATIENT
Start: 2023-01-23 | End: 2023-01-23 | Stop reason: HOSPADM

## 2023-01-23 RX ORDER — FENTANYL CITRATE 50 UG/ML
50-100 INJECTION, SOLUTION INTRAMUSCULAR; INTRAVENOUS EVERY 5 MIN PRN
Status: DISCONTINUED | OUTPATIENT
Start: 2023-01-23 | End: 2023-01-23 | Stop reason: HOSPADM

## 2023-01-23 RX ADMIN — MIDAZOLAM HYDROCHLORIDE 2 MG: 1 INJECTION, SOLUTION INTRAMUSCULAR; INTRAVENOUS at 13:54

## 2023-01-23 RX ADMIN — FENTANYL CITRATE 100 MCG: 50 INJECTION, SOLUTION INTRAMUSCULAR; INTRAVENOUS at 13:54

## 2023-01-23 NOTE — H&P
Pre-Endoscopy History and Physical     Sloane Aguilar MRN# [id number]   YOB: 1975 Age: 47 year old     Date of Procedure: 1/23/2023  Primary care provider: No Ref-Primary, Physician  Type of Endoscopy: Colonoscopy with possible biopsy, possible polypectomy  Reason for Procedure: screen  Type of Anesthesia Anticipated: Conscious Sedation    HPI:    Sloane is a 47 year old female who will be undergoing the above procedure.      A history and physical has been performed. The patient's medications and allergies have been reviewed. The risks and benefits of the procedure and the sedation options and risks were discussed with the patient.  All questions were answered and informed consent was obtained.      She denies a personal or family history of anesthesia complications or bleeding disorders.     Patient Active Problem List   Diagnosis     Anxiety     CARDIOVASCULAR SCREENING; LDL GOAL LESS THAN 160     History of cocaine dependence (H)     Elevated fasting glucose     Dysplasia of cervix, high grade NICK 2     Cervical high risk HPV (human papillomavirus) test positive        Past Medical History:   Diagnosis Date     Abnormal cervical Papanicolaou smear 05/09/2018    see problem list     anxiety      Cervical high risk HPV (human papillomavirus) test positive 05/09/2018    See problem list     History of colposcopy 08/06/2018 2/22/21        Past Surgical History:   Procedure Laterality Date     HC TOOTH EXTRACTION W/FORCEP       HERNIA REPAIR, UMBILICAL  2007     LEEP TX, CERVICAL  2000, 2018    LEEP TX Cervical     TUBAL LIGATION  2007    ablation     ZZC APPENDECTOMY         Social History     Tobacco Use     Smoking status: Never     Smokeless tobacco: Never     Tobacco comments:     Vape- daily   Substance Use Topics     Alcohol use: Yes     Comment: 3-4 times per week       Family History   Problem Relation Age of Onset     Hypertension Mother      Cancer Mother         uterine dx 2012      Heart Disease Mother      Heart Disease Father      Heart Disease Brother 48        stint       Prior to Admission medications    Medication Sig Start Date End Date Taking? Authorizing Provider   aluminum chloride (DRYSOL) 20 % external solution Use daily as directed 10/9/19  Yes Jemma Calixto APRN CNP   bisacodyl (DULCOLAX) 5 MG EC tablet Take 2 tablets at 3 pm the day before your procedure. If your procedure is before 11 am, take 2 additional tablets at 11 pm. If your procedure is after 11 am, take 2 additional tablets at 6 am. For additional instructions refer to your colonoscopy prep instructions. 1/12/23  Yes Burak Eubanks MD   bisacodyl (DULCOLAX) 5 MG EC tablet Take 2 tablets at 3 pm the day before your procedure. If your procedure is before 11 am, take 2 additional tablets at 11 pm. If your procedure is after 11 am, take 2 additional tablets at 6 am. For additional instructions refer to your colonoscopy prep instructions. 11/4/22  Yes Yulisa Gerardo MD   clotrimazole-betamethasone (LOTRISONE) 1-0.05 % external cream Apply topically 2 times daily 2/13/19  Yes Hany Ovalles MD   escitalopram (LEXAPRO) 20 MG tablet Take 1 tablet (20 mg) by mouth daily 7/18/22  Yes Jaqueline Goyal PA-C   estradiol-norethindrone (AMABELZ) 0.5-0.1 MG tablet TAKE 1 TABLET BY MOUTH DAILY 11/18/22  Yes Hany Ovalles MD   polyethylene glycol (GOLYTELY) 236 g suspension The night before the exam at 6 pm drink an 8-ounce glass every 15 minutes until the jug is half empty. If you arrive before 11 AM: Drink the other half of the Golytely jug at 11 PM night before procedure. If you arrive after 11 AM: Drink the other half of the Golytely jug at 6 AM day of procedure. For additional instructions refer to your colonoscopy prep instructions. 1/12/23  Yes Burak Eubanks MD   polyethylene glycol (GOLYTELY) 236 g suspension The night before the exam at 6 pm drink an 8-ounce glass every 15 minutes until the jug is half  empty. If you arrive before 11 AM: Drink the other half of the Golytely jug at 11 PM night before procedure. If you arrive after 11 AM: Drink the other half of the Golytely jug at 6 AM day of procedure. For additional instructions refer to your colonoscopy prep instructions. 11/4/22  Yes Yulisa Gerardo MD   traZODone (DESYREL) 50 MG tablet Take 1-2 tablets ( mg) by mouth nightly as needed for sleep  Patient taking differently: Take  mg by mouth as needed for sleep 11/12/18  Yes Hany Ovalles MD   valACYclovir (VALTREX) 500 MG tablet Take 1 tablet (500 mg) by mouth daily 12/20/22  Yes Hany Ovalles MD       Allergies   Allergen Reactions     Formaldehyde Rash        REVIEW OF SYSTEMS:   5 point ROS negative except as noted above in HPI, including Gen., Resp., CV, GI &  system review.    PHYSICAL EXAM:   BP (!) 126/90   Pulse 100   Temp 97.9  F (36.6  C) (Temporal)   Resp 12   Ht 1.524 m (5')   Wt 61.2 kg (135 lb)   SpO2 100%   BMI 26.37 kg/m   Estimated body mass index is 26.37 kg/m  as calculated from the following:    Height as of this encounter: 1.524 m (5').    Weight as of this encounter: 61.2 kg (135 lb).   GENERAL APPEARANCE: alert, and oriented  MENTAL STATUS: alert  AIRWAY EXAM: Mallampatti Class I (visualization of the soft palate, fauces, uvula, anterior and posterior pillars)  RESP: lungs clear to auscultation - no rales, rhonchi or wheezes  CV: regular rates and rhythm  DIAGNOSTICS:    Not indicated    IMPRESSION   ASA Class 2 - Mild systemic disease    PLAN:   Plan for Colonoscopy. We discussed the risks, benefits and alternatives and the patient wished to proceed.    The above has been forwarded to the consulting provider.      Signed Electronically by: Burak Eubanks MD  January 23, 2023

## 2023-03-26 ENCOUNTER — HEALTH MAINTENANCE LETTER (OUTPATIENT)
Age: 48
End: 2023-03-26

## 2023-03-28 DIAGNOSIS — B00.9 RECURRENT HSV (HERPES SIMPLEX VIRUS): ICD-10-CM

## 2023-03-28 RX ORDER — VALACYCLOVIR HYDROCHLORIDE 500 MG/1
TABLET, FILM COATED ORAL
Qty: 30 TABLET | Refills: 1 | Status: SHIPPED | OUTPATIENT
Start: 2023-03-28 | End: 2023-06-21

## 2023-03-28 NOTE — TELEPHONE ENCOUNTER
"Requested Prescriptions   Pending Prescriptions Disp Refills     valACYclovir (VALTREX) 500 MG tablet [Pharmacy Med Name: VALACYCLOVIR 500MG TABLETS] 30 tablet 1     Sig: TAKE 1 TABLET(500 MG) BY MOUTH DAILY       Antivirals for Herpes Protocol Passed - 3/28/2023  9:22 AM        Passed - Patient is age 12 or older        Passed - Recent (12 mo) or future (30 days) visit within the authorizing provider's specialty     Patient has had an office visit with the authorizing provider or a provider within the authorizing providers department within the previous 12 mos or has a future within next 30 days. See \"Patient Info\" tab in inbasket, or \"Choose Columns\" in Meds & Orders section of the refill encounter.              Passed - Medication is active on med list        Passed - Normal serum creatinine on file in past 12 months     Recent Labs   Lab Test 07/20/22  0901   CR 0.78       Ok to refill medication if creatinine is low             Filled.    Shanae REULAS RN BSN        "

## 2023-06-21 DIAGNOSIS — B00.9 RECURRENT HSV (HERPES SIMPLEX VIRUS): ICD-10-CM

## 2023-06-21 RX ORDER — VALACYCLOVIR HYDROCHLORIDE 500 MG/1
500 TABLET, FILM COATED ORAL DAILY
Qty: 30 TABLET | Refills: 3 | Status: SHIPPED | OUTPATIENT
Start: 2023-06-21

## 2023-06-21 NOTE — TELEPHONE ENCOUNTER
valacyclovir      Last Written Prescription Date:  3/28/23  Last Fill Quantity: 30,   # refills: 1  Last Office Visit: 6/3/22  Future Office visit:    Next 5 appointments (look out 90 days)    Jul 11, 2023  1:15 PM  PHYSICAL with Hany Ovalles MD  [facility] ([facility] ) [address]  [address]  [facility] [id number] [phone]

## 2023-07-11 ENCOUNTER — OFFICE VISIT (OUTPATIENT)
Dept: OBGYN | Facility: CLINIC | Age: 48
End: 2023-07-11
Payer: COMMERCIAL

## 2023-07-11 VITALS — SYSTOLIC BLOOD PRESSURE: 126 MMHG | BODY MASS INDEX: 30.08 KG/M2 | WEIGHT: 154 LBS | DIASTOLIC BLOOD PRESSURE: 80 MMHG

## 2023-07-11 DIAGNOSIS — Z01.419 ENCOUNTER FOR GYNECOLOGICAL EXAMINATION WITHOUT ABNORMAL FINDING: ICD-10-CM

## 2023-07-11 DIAGNOSIS — E78.00 ELEVATED CHOLESTEROL: ICD-10-CM

## 2023-07-11 DIAGNOSIS — R61 PERSPIRATION EXCESSIVE: ICD-10-CM

## 2023-07-11 DIAGNOSIS — N95.1 MENOPAUSAL SYNDROME (HOT FLASHES): ICD-10-CM

## 2023-07-11 DIAGNOSIS — R87.810 CERVICAL HIGH RISK HPV (HUMAN PAPILLOMAVIRUS) TEST POSITIVE: ICD-10-CM

## 2023-07-11 DIAGNOSIS — Z12.31 ENCOUNTER FOR SCREENING MAMMOGRAM FOR BREAST CANCER: Primary | ICD-10-CM

## 2023-07-11 DIAGNOSIS — F51.01 PRIMARY INSOMNIA: ICD-10-CM

## 2023-07-11 PROCEDURE — G0145 SCR C/V CYTO,THINLAYER,RESCR: HCPCS | Performed by: OBSTETRICS & GYNECOLOGY

## 2023-07-11 PROCEDURE — 99396 PREV VISIT EST AGE 40-64: CPT | Performed by: OBSTETRICS & GYNECOLOGY

## 2023-07-11 PROCEDURE — 99213 OFFICE O/P EST LOW 20 MIN: CPT | Mod: 25 | Performed by: OBSTETRICS & GYNECOLOGY

## 2023-07-11 PROCEDURE — 87624 HPV HI-RISK TYP POOLED RSLT: CPT | Performed by: OBSTETRICS & GYNECOLOGY

## 2023-07-11 RX ORDER — ESTRADIOL AND NORETHINDRONE ACETATE .5; .1 MG/1; MG/1
1 TABLET ORAL DAILY
Qty: 84 TABLET | Refills: 3 | Status: SHIPPED | OUTPATIENT
Start: 2023-07-11

## 2023-07-11 RX ORDER — TRAZODONE HYDROCHLORIDE 50 MG/1
50-100 TABLET, FILM COATED ORAL
Qty: 180 TABLET | Refills: 1 | Status: SHIPPED | OUTPATIENT
Start: 2023-07-11

## 2023-07-11 ASSESSMENT — PATIENT HEALTH QUESTIONNAIRE - PHQ9: 5. POOR APPETITE OR OVEREATING: NOT AT ALL

## 2023-07-11 ASSESSMENT — ANXIETY QUESTIONNAIRES
7. FEELING AFRAID AS IF SOMETHING AWFUL MIGHT HAPPEN: NOT AT ALL
6. BECOMING EASILY ANNOYED OR IRRITABLE: NOT AT ALL
5. BEING SO RESTLESS THAT IT IS HARD TO SIT STILL: NOT AT ALL
1. FEELING NERVOUS, ANXIOUS, OR ON EDGE: NOT AT ALL
GAD7 TOTAL SCORE: 0
2. NOT BEING ABLE TO STOP OR CONTROL WORRYING: NOT AT ALL
GAD7 TOTAL SCORE: 0
3. WORRYING TOO MUCH ABOUT DIFFERENT THINGS: NOT AT ALL
IF YOU CHECKED OFF ANY PROBLEMS ON THIS QUESTIONNAIRE, HOW DIFFICULT HAVE THESE PROBLEMS MADE IT FOR YOU TO DO YOUR WORK, TAKE CARE OF THINGS AT HOME, OR GET ALONG WITH OTHER PEOPLE: NOT DIFFICULT AT ALL

## 2023-07-11 NOTE — PROGRESS NOTES
HPI:  Sloane Aguilar is a 47 year old white female  ,tubal ligation and endometrial ablation for contraception who presents for an annual exam and pap.  She is presently doing well.  She uses a low-dose OCP to help with perimenopausal symptoms.  She has no absolute contraindication to this.  She has had no vaginal bleeding.  In reviewing with her the findings discussing with her the risks benefits and alternative forms of therapy she would like to continue with this OCP.  She is also asking for refill on medications.  I did review her lipid panel results from last year including her excellent HDL.  Self breast exam,  ACS screening mammogram recs, the use of 81 mg ASA to decrease the risk of heart disease, lipid screening, colon cancer screening recs and Dexa scan recs thoroughly reveiwed.      Past Medical History:   Diagnosis Date     Abnormal cervical Papanicolaou smear 2018    see problem list     anxiety      Cervical high risk HPV (human papillomavirus) test positive 2018    See problem list     History of colposcopy 2018     Past Surgical History:   Procedure Laterality Date     COLONOSCOPY N/A 2023    Procedure: COLONOSCOPY;  Surgeon: Burak Eubanks MD;  Location: [facility]     HC TOOTH EXTRACTION W/FORCEP       HERNIA REPAIR, UMBILICAL  2007     LEEP TX, CERVICAL  ,     LEEP TX Cervical     TUBAL LIGATION  2007    ablation     ZZC APPENDECTOMY       Family History   Problem Relation Age of Onset     Hypertension Mother      Cancer Mother         uterine dx 2012     Heart Disease Mother      Heart Disease Father      Heart Disease Brother 48        stint     Social History     Socioeconomic History     Marital status:      Spouse name: Not on file     Number of children: Not on file     Years of education: Not on file     Highest education level: Not on file   Occupational History     Occupation:      Comment: results title   Tobacco  Use     Smoking status: Never     Smokeless tobacco: Never     Tobacco comments:     Vape- daily   Vaping Use     Vaping Use: Never used   Substance and Sexual Activity     Alcohol use: Yes     Comment: 3-4 times per week     Drug use: No     Sexual activity: Yes     Partners: Male     Birth control/protection: Surgical, Female Surgical     Comment: TL   Other Topics Concern     Parent/sibling w/ CABG, MI or angioplasty before 65F 55M? Yes     Comment: brother stint at 48   Social History Narrative     Not on file     Social Determinants of Health     Financial Resource Strain: Not on file   Food Insecurity: Not on file   Transportation Needs: Not on file   Physical Activity: Not on file   Stress: Not on file   Social Connections: Not on file   Intimate Partner Violence: Not on file   Housing Stability: Not on file       Allergies:  Formaldehyde    Current Outpatient Medications   Medication Sig Dispense Refill     aluminum chloride (DRYSOL) 20 % external solution Use daily as directed 35 mL prn     escitalopram (LEXAPRO) 20 MG tablet Take 1 tablet (20 mg) by mouth daily 90 tablet 3     estradiol-norethindrone (AMABELZ) 0.5-0.1 MG tablet Take 1 tablet by mouth daily 84 tablet 3     traZODone (DESYREL) 50 MG tablet Take 1-2 tablets ( mg) by mouth nightly as needed for sleep 180 tablet 1     valACYclovir (VALTREX) 500 MG tablet Take 1 tablet (500 mg) by mouth daily 30 tablet 3     clotrimazole-betamethasone (LOTRISONE) 1-0.05 % external cream Apply topically 2 times daily (Patient not taking: Reported on 7/11/2023) 30 g 0       ROS: ROS: 10 point ROS neg other than the symptoms noted above in the HPI.    EXAM:  Vitals: /80   Wt 69.9 kg (154 lb)   BMI 30.08 kg/m    BMI= Body mass index is 30.08 kg/m .  Constitutional: healthy, alert and no distress  Head: Normocephalic. No masses, lesions, tenderness or abnormalities  Neck: Neck supple. No adenopathy. Thyroid symmetric, normal size,, Carotids without  bruits.  ENT: NEGATIVE for ear, mouth and throat problems  Breast:  breasts symmetric, no dominant or suspicious mass, no skin or nipple changes, no axillary adenopathy, unchanged from previous exam or self exam in taught and encouraged  Cardiovascular: negative, PMI normal. No lifts, heaves, or thrills. RRR. No murmurs, clicks gallops or rub  Respiratory: negative, Percussion normal. Good diaphragmatic excursion. Lungs clear  Gastrointestinal: Abdomen soft, non-tender. BS normal. No masses, organomegaly  Genitourinary: Pelvic Exam:  External Genitalia:     Normal appearance for age, no discharge present, no tenderness present, no inflammatory lesions present, color normal  Vagina:     Normal vaginal vault without central or paravaginal defects, no discharge present, no inflammatory lesions present, no masses present  Bladder:     Nontender to palpation  Urethra:   Urethral Body:  Urethra palpation normal, urethra structural support normal   Urethral Meatus:  No erythema or lesions present  Cervix:     Appearance healthy, no lesions present, nontender to palpation, no bleeding present  Uterus:     Uterus: firm, normal sized and nontender, midplane in position.   Adnexa:     No adnexal tenderness present, no adnexal masses present  Perineum:     Perineum within normal limits, no evidence of trauma, no rashes or skin lesions present  Anus:     Anus within normal limits, no hemorrhoids present  Inguinal Lymph Nodes:     No lymphadenopathy present  Pubic Hair:     Normal pubic hair distribution for age  Genitalia and Groin:     No rashes present, no lesions present, no areas of discoloration, no masses present    Musculoskeletalextremities normal- no gross deformities noted, gait normal and normal muscle tone  Integument: no suspicious lesions or rashes  Neurologic: Gait normal. Reflexes normal and symmetric. Sensation grossly WNL.  Psychiatric: mentation appears normal and affect  normal/bright  Hematologic/Lymphatic/Immunologic: Normal cervical lymph nodes     ASSESSMENT:/PLAN:  (Z12.31) Encounter for screening mammogram for breast cancer  (primary encounter diagnosis)  Comment: Previous values and screening recommendations reviewed  Plan: MA Screen Bilateral w/Jim        Future order placed     (Z01.419) Encounter for gynecological examination without abnormal finding  Comment: other than above health issues normal GYN exam  Plan: Return in 1 year or sooner should concerns arise    (R61) Perspiration excessive  Comment: Patient would like a refill  Plan: aluminum chloride (DRYSOL) 20 % external         solution        Given    (F51.01) Primary insomnia  Comment: Patient uses trazodone on a as needed basis for insomnia with good results.  She has no absolute contraindication to this.  Alternatives have not provided the same degree of efficacy  Plan: traZODone (DESYREL) 50 MG tablet        Refill done    (E78.00) Elevated cholesterol  Comment: Family history and previous values reviewed  Plan: Lipid panel reflex to direct LDL Fasting,         DISCONTINUED: Tdap, tetanus-diphtheria-acell         pertussis, (ADACEL) 5-2-15.5 LF-MCG/0.5         injection        In light of her elevated HDL we will recheck a fasting lipid panel and follow a heart healthy diet    (N95.1) Menopausal syndrome (hot flashes)  Comment: Symptoms well controlled with OCPs  Plan: estradiol-norethindrone (AMABELZ) 0.5-0.1 MG         tablet        Patient requests a refill    (R87.810) Cervical high risk HPV (human papillomavirus) test positive  Comment: Past history reviewed  Plan: Pap screen with HPV - recommended age 30 - 65         years        Done for routine cytology and cotest      Hany Ovalles M.D.

## 2023-07-11 NOTE — PATIENT INSTRUCTIONS
You can reach your [facility] Care Team any time of the day by calling [phone]. This number will put you in touch with the 24 hour nurse line if the clinic is closed.    To contact your OB/GYN Station Coordinator/Surgery Scheduler please call [phone]. This is a direct number for your care team between 8 a.m. and 4 p.m. Monday through Friday.    [facility] is open for your convenience:  Monday through Friday 8 a.m. to 6 p.m.  Closed weekends and all major holidays.

## 2023-07-11 NOTE — NURSING NOTE
Chief Complaint   Patient presents with     Physical       Initial /80   Wt 69.9 kg (154 lb)   BMI 30.08 kg/m   Estimated body mass index is 30.08 kg/m  as calculated from the following:    Height as of 23: 1.524 m (5').    Weight as of this encounter: 69.9 kg (154 lb).  BP completed using cuff size: regular    Questioned patient about current smoking habits.  Pt. has never smoked.          The following HM Due: pap smear      The following patient reported/Care Every where data was sent to:  P ABSTRACT QUALITY INITIATIVES [[id number]]        Iesha Garcia, [facility]

## 2023-07-12 DIAGNOSIS — R61 PERSPIRATION EXCESSIVE: ICD-10-CM

## 2023-07-12 RX ORDER — ALUMINUM CHLORIDE 20 %
LOTION (ML) TOPICAL
OUTPATIENT
Start: 2023-07-12

## 2023-07-12 NOTE — TELEPHONE ENCOUNTER
BROMILOTION     Last Written Prescription Date:  0  Last Fill Quantity: 0,   # refills: 0  Last Office Visit: 7/11/2023  Future Office visit:  NONE     DRYSOL DAB-O-MATIC 35 ML NOT COVERED BY PT'S INSURANCE PER PHARMACY     Angelika Montoya, CMA on 7/12/2023 at 9:12 AM

## 2023-07-19 ENCOUNTER — PATIENT OUTREACH (OUTPATIENT)
Dept: OBGYN | Facility: CLINIC | Age: 48
End: 2023-07-19
Payer: COMMERCIAL

## 2023-08-31 DIAGNOSIS — F41.9 ANXIETY: ICD-10-CM

## 2023-08-31 NOTE — LETTER
September 13, 2023      Sloane Aguilar  [address] [address] [facility] [address]        Benigno Lockefer,    We recently received a call from your pharmacy requesting a  refill request for your medication. We are contacting you today to notify you that you are due for PHYSICAL for further refills.     We have authorized a one time refill of your medication to allow time for you to schedule your appointment.     Please call [phone] to schedule an appointment or if you have MyChart chart you can schedule with your provider as well.     Taking care of your health is important to us, and ongoing visits with your provider are vital to your care. We look forward to seeing you in the near future.     Thank you for using [facility] for your medical needs.     Sincerely,        Jaqueline Goyal PA-C

## 2023-09-05 NOTE — TELEPHONE ENCOUNTER
Routing refill request to provider for review/approval because:  Patient needs to be seen because it has been more than 1 year since last office visit..    Ana Wu RN on 9/5/2023 at 2:44 PM

## 2023-09-06 RX ORDER — ESCITALOPRAM OXALATE 20 MG/1
20 TABLET ORAL DAILY
Qty: 90 TABLET | Refills: 0 | Status: SHIPPED | OUTPATIENT
Start: 2023-09-06

## 2023-09-06 NOTE — TELEPHONE ENCOUNTER
Sent Nomi message requesting a call back for an appt. Two more attempts will be made.    Loretta Pollock  Lead

## 2023-10-04 ASSESSMENT — SLEEP AND FATIGUE QUESTIONNAIRES
HOW LIKELY ARE YOU TO NOD OFF OR FALL ASLEEP WHILE SITTING INACTIVE IN A PUBLIC PLACE: WOULD NEVER DOZE
HOW LIKELY ARE YOU TO NOD OFF OR FALL ASLEEP WHILE LYING DOWN TO REST IN THE AFTERNOON WHEN CIRCUMSTANCES PERMIT: SLIGHT CHANCE OF DOZING
HOW LIKELY ARE YOU TO NOD OFF OR FALL ASLEEP IN A CAR, WHILE STOPPED FOR A FEW MINUTES IN TRAFFIC: WOULD NEVER DOZE
HOW LIKELY ARE YOU TO NOD OFF OR FALL ASLEEP WHILE SITTING AND READING: SLIGHT CHANCE OF DOZING
HOW LIKELY ARE YOU TO NOD OFF OR FALL ASLEEP WHILE SITTING AND TALKING TO SOMEONE: WOULD NEVER DOZE
HOW LIKELY ARE YOU TO NOD OFF OR FALL ASLEEP WHILE SITTING QUIETLY AFTER LUNCH WITHOUT ALCOHOL: WOULD NEVER DOZE
HOW LIKELY ARE YOU TO NOD OFF OR FALL ASLEEP WHILE WATCHING TV: SLIGHT CHANCE OF DOZING
HOW LIKELY ARE YOU TO NOD OFF OR FALL ASLEEP WHEN YOU ARE A PASSENGER IN A CAR FOR AN HOUR WITHOUT A BREAK: SLIGHT CHANCE OF DOZING

## 2023-10-11 ENCOUNTER — VIRTUAL VISIT (OUTPATIENT)
Dept: SLEEP MEDICINE | Facility: CLINIC | Age: 48
End: 2023-10-11
Payer: COMMERCIAL

## 2023-10-11 VITALS — BODY MASS INDEX: 27.48 KG/M2 | HEIGHT: 60 IN | WEIGHT: 140 LBS

## 2023-10-11 DIAGNOSIS — R06.83 SNORING: ICD-10-CM

## 2023-10-11 DIAGNOSIS — G47.9 SLEEP DISTURBANCE: ICD-10-CM

## 2023-10-11 DIAGNOSIS — E83.19 OTHER DISORDERS OF IRON METABOLISM: Primary | ICD-10-CM

## 2023-10-11 DIAGNOSIS — F41.9 ANXIETY: ICD-10-CM

## 2023-10-11 DIAGNOSIS — R53.82 CHRONIC FATIGUE: ICD-10-CM

## 2023-10-11 DIAGNOSIS — R06.81 WITNESSED APNEIC SPELLS: ICD-10-CM

## 2023-10-11 DIAGNOSIS — G25.81 RESTLESS LEGS SYNDROME (RLS): ICD-10-CM

## 2023-10-11 DIAGNOSIS — G47.21 DELAYED SLEEP PHASE SYNDROME: ICD-10-CM

## 2023-10-11 DIAGNOSIS — F51.04 CHRONIC INSOMNIA: ICD-10-CM

## 2023-10-11 PROCEDURE — 99205 OFFICE O/P NEW HI 60 MIN: CPT | Mod: VID | Performed by: INTERNAL MEDICINE

## 2023-10-11 ASSESSMENT — PAIN SCALES - GENERAL: PAINLEVEL: NO PAIN (0)

## 2023-10-11 NOTE — PATIENT INSTRUCTIONS
"          MY TREATMENT INFORMATION FOR SLEEP APNEA-  Sloane Aguilar    DOCTOR : Moose Rojas MD    Am I having a home sleep study?  --->Watch the video for the device you are using:    -/drop off device-   https://www.Introhive.com/watch?v=yGGFBdELGhk      Frequently asked questions:  1. What is Obstructive Sleep Apnea (FRANCINE)? FRANCINE is the most common type of sleep apnea. Apnea means, \"without breath.\"  Apnea is most often caused by narrowing or collapse of the upper airway as muscles relax during sleep.   Almost everyone has occasional apneas. Most people with sleep apnea have had brief interruptions at night frequently for many years.  The severity of sleep apnea is related to how frequent and severe the events are.   2. What are the consequences of FRANCINE? Symptoms include: feeling sleepy during the day, snoring loudly, gasping or stopping of breathing, trouble sleeping, and occasionally morning headaches or heartburn at night.  Sleepiness can be serious and even increase the risk of falling asleep while driving. Other health consequences may include development of high blood pressure and other cardiovascular disease in persons who are susceptible. Untreated FRANCINE  can contribute to heart disease, stroke and diabetes.   3. What are the treatment options? In most situations, sleep apnea is a lifelong disease that must be managed with daily therapy. Medications are not effective for sleep apnea and surgery is generally not considered until other therapies have been tried. Your treatment is your choice . Continuous Positive Airway (CPAP) works right away and is the therapy that is effective in nearly everyone. An oral device to hold your jaw forward is usually the next most reliable option. Other options include postioning devices (to keep you off your back), weight loss, and surgery including a tongue pacing device. There is more detail about some of these options below.  4. Are my sleep " studies covered by insurance? Although we will request verification of coverage, we advise you also check in advance of the study to ensure there is coverage.    Important tips for those choosing CPAP and similar devices  For new devices, sign up for device MUNIR to monitor your device for your followup visits  We encourage you to utilize the [employer] munir or website ([employer] web (resmed.com) ) to monitor your therapy progress and share the data with your healthcare team when you discuss your sleep apnea.                                                    Know your equipment:  CPAP is continuous positive airway pressure that prevents obstructive sleep apnea by keeping the throat from collapsing while you are sleeping. In most cases, the device is  smart  and can slowly self-adjusts if your throat collapses and keeps a record every day of how well you are treated-this information is available to you and your care team.  BPAP is bilevel positive airway pressure that keeps your throat open and also assists each breath with a pressure boost to maintain adequate breathing.  Special kinds of BPAP are used in patients who have inadequate breathing from lung or heart disease. In most cases, the device is  smart  and can slowly self-adjusts to assist breathing. Like CPAP, the device keeps a record of how well you are treated.  Your mask is your connection to the device. You get to choose what feels most comfortable and the staff will help to make sure if fits. Here: are some examples of the different masks that are available:       Key points to remember on your journey with sleep apnea:  Sleep study.  PAP devices often need to be adjusted during a sleep study to show that they are effective and adjusted right.  Good tips to remember: Try wearing just the mask during a quiet time during the day so your body adapts to wearing it. A humidifier is recommended for comfort in most cases to prevent drying of your nose and  throat. Allergy medication from your provider may help you if you are having nasal congestion.  Getting settled-in. It takes more than one night for most of us to get used to wearing a mask. Try wearing just the mask during a quiet time during the day so your body adapts to wearing it. A humidifier is recommended for comfort in most cases. Our team will work with you carefully on the first day and will be in contact within 4 days and again at 2 and 4 weeks for advice and remote device adjustments. Your therapy is evaluated by the device each day.   Use it every night. The more you are able to sleep naturally for 7-8 hours, the more likely you will have good sleep and to prevent health risks or symptoms from sleep apnea. Even if you use it 4 hours it helps. Occasionally all of us are unable to use a medical therapy, in sleep apnea, it is not dangerous to miss one night.   Communicate. Call our skilled team on the number provided on the first day if your visit for problems that make it difficult to wear the device. Over 2 out of 3 patients can learn to wear the device long-term with help from our team. Remember to call our team or your sleep providers if you are unable to wear the device as we may have other solutions for those who cannot adapt to mask CPAP therapy. It is recommended that you sleep your sleep provider within the first 3 months and yearly after that if you are not having problems.   Use it for your health. We encourage use of CPAP masks during daytime quiet periods to allow your face and brain to adapt to the sensation of CPAP so that it will be a more natural sensation to awaken to at night or during naps. This can be very useful during the first few weeks or months of adapting to CPAP though it does not help medically to wear CPAP during wakefulness and  should not be used as a strategy just to meet guidelines.  Take care of your equipment. Make sure you clean your mask and tubing using directions  every day and that your filter and mask are replaced as recommended or if they are not working.     BESIDES CPAP, WHAT OTHER THERAPIES ARE THERE?    Positioning Device  Positioning devices are generally used when sleep apnea is mild and only occurs on your back.This example shows a pillow that straps around the waist. It may be appropriate for those whose sleep study shows milder sleep apnea that occurs primarily when lying flat on one's back. Preliminary studies have shown benefit but effectiveness at home may need to be verified by a home sleep test. These devices are generally not covered by medical insurance.  Examples of devices that maintain sleeping on the back to prevent snoring and mild sleep apnea.    Belt type body positioner  http://Tr[employer] [email or web address]/    Electronic reminder  http://nightshifttherapy.com/            Oral Appliance  What is oral appliance therapy?  An oral appliance device fits on your teeth at night like a retainer used after having braces. The device is made by a specialized dentist and requires several visits over 1-2 months before a manufactured device is made to fit your teeth and is adjusted to prevent your sleep apnea. Once an oral device is working properly, snoring should be improved. A home sleep test may be recommended at that time if to determine whether the sleep apnea is adequately treated.       Some things to remember:  -Oral devices are often, but not always, covered by your medical insurance. Be sure to check with your insurance provider.   -If you are referred for oral therapy, you will be given a list of specialized dentists to consider or you may choose to visit the Web site of the American Academy of Dental Sleep Medicine  -Oral devices are less likely to work if you have severe sleep apnea or are extremely overweight.     More detailed information  An oral appliance is a small acrylic device that fits over the upper and lower teeth  (similar to a retainer or a mouth  guard). This device slightly moves jaw forward, which moves the base of the tongue forward, opens the airway, improves breathing for effective treat snoring and obstructive sleep apnea in perhaps 7 out of 10 people .  The best working devices are custom-made by a dental device  after a mold is made of the teeth 1, 2, 3.  When is an oral appliance indicated?  Oral appliance therapy is recommended as a first-line treatment for patients with primary snoring, mild sleep apnea, and for patients with moderate sleep apnea who prefer appliance therapy to use of CPAP4, 5. Severity of sleep apnea is determined by sleep testing and is based on the number of respiratory events per hour of sleep.   How successful is oral appliance therapy?  The success rate of oral appliance therapy in patients with mild sleep apnea is 75-80% while in patients with moderate sleep apnea it is 50-70%. The chance of success in patients with severe sleep apnea is 40-50%. The research also shows that oral appliances have a beneficial effect on the cardiovascular health of FRANCINE patients at the same magnitude as CPAP therapy7.  Oral appliances should be a second-line treatment in cases of severe sleep apnea, but if not completely successful then a combination therapy utilizing CPAP plus oral appliance therapy may be effective. Oral appliances tend to be effective in a broad range of patients although studies show that the patients who have the highest success are females, younger patients, those with milder disease, and less severe obesity. 3, 6.   Finding a dentist that practices dental sleep medicine  Specific training is available through the American Academy of Dental Sleep Medicine for dentists interested in working in the field of sleep. To find a dentist who is educated in the field of sleep and the use of oral appliances, near you, visit the Web site of the American Academy of Dental Sleep Medicine.    References  1. Maria A et  al. Objectively measured vs self-reported compliance during oral appliance therapy for sleep-disordered breathing. Chest 2013; 144(5): 5303-4363.  2. Rashawn, et al. Objective measurement of compliance during oral appliance therapy for sleep-disordered breathing. Thorax 2013; 68(1): 91-96.  3. Hortencia et al. Mandibular advancement devices in 620 men and women with FRANCINE and snoring: tolerability and predictors of treatment success. Chest 2004; 125: 3599-3821.  4. Artem et al. Oral appliances for snoring and FRANCINE: a review. Sleep 2006; 29: 244-262.  5. Dayana et al. Oral appliance treatment for FRANCINE: an update. J Clin Sleep Med 2014; 10(2): 215-227.  6. Sherice et al. Predictors of OSAH treatment outcome. J Dent Res 2007; 86: 3587-9818.      Weight Loss:    Weight loss is a long-term strategy that may improve sleep apnea in some patients.    Weight management is a personal decision and the decision should be based on your interest and the potential benefits.  If you are interested in exploring weight loss strategies, the following discussion covers the impact on weight loss on sleep apnea and the approaches that may be successful.    Being overweight does not necessarily mean you will have health consequences.  Those who have BMI over 35 or over 27 with existing medical conditions carries greater risk.   Weight loss decreases severity of sleep apnea in most people with obesity. For those with mild obesity who have developed snoring with weight gain, even 15-30 pound weight loss can improve and occasionally eliminate sleep apnea.  Structured and life-long dietary and health habits are necessary to lose weight and keep healthier weight levels.     Though there may be significant health benefits from weight loss, long-term weight loss is very difficult to achieve- studies show success with dietary management in less than 10% of people. In addition, substantial weight loss may require years of dietary control  and may be difficult if patients have severe obesity. In these cases, surgical management may be considered.  Finally, older individuals who have tolerated obesity without health complications may be less likely to benefit from weight loss strategies.      [unfilled]    Surgery:    Surgery for obstructive sleep apnea is considered generally only when other therapies fail to work. Surgery may be discussed with you if you are having a difficult time tolerating CPAP and or when there is an abnormal structure that requires surgical correction.  Nose and throat surgeries often enlarge the airway to prevent collapse.  Most of these surgeries create pain for 1-2 weeks and up to half of the most common surgeries are not effective throughout life.  You should carefully discuss the benefits and drawbacks to surgery with your sleep provider and surgeon to determine if it is the best solution for you.   More information  Surgery for FRANCINE is directed at areas that are responsible for narrowing or complete obstruction of the airway during sleep.  There are a wide range of procedures available to enlarge and/or stabilize the airway to prevent blockage of breathing in the three major areas where it can occur: the palate, tongue, and nasal regions.  Successful surgical treatment depends on the accurate identification of the factors responsible for obstructive sleep apnea in each person.  A personalized approach is required because there is no single treatment that works well for everyone.  Because of anatomic variation, consultation with an examination by a sleep surgeon is a critical first step in determining what surgical options are best for each patient.  In some cases, examination during sedation may be recommended in order to guide the selection of procedures.  Patients will be counseled about risks and benefits as well as the typical recovery course after surgery. Surgery is typically not a cure for a person s FRANCINE.  However,  surgery will often significantly improve one s FRANCINE severity (termed  success rate ).  Even in the absence of a cure, surgery will decrease the cardiovascular risk associated with OSA7; improve overall quality of life8 (sleepiness, functionality, sleep quality, etc).      Palate Procedures:  Patients with FRANCINE often have narrowing of their airway in the region of their tonsils and uvula.  The goals of palate procedures are to widen the airway in this region as well as to help the tissues resist collapse.  Modern palate procedure techniques focus on tissue conservation and soft tissue rearrangement, rather than tissue removal.  Often the uvula is preserved in this procedure. Residual sleep apnea is common in patient after pharyngoplasty with an average reduction in sleep apnea events of 33%2.      Tongue Procedures:  ExamWhile patients are awake, the muscles that surround the throat are active and keep this region open for breathing. These muscles relax during sleep, allowing the tongue and other structures to collapse and block breathing.  There are several different tongue procedures available.  Selection of a tongue base procedure depends on characteristics seen on physical exam.  Generally, procedures are aimed at removing bulky tissues in this area or preventing the back of the tongue from falling back during sleep.  Success rates for tongue surgery range from 50-62%3.    Hypoglossal Nerve Stimulation:  Hypoglossal nerve stimulation has recently received approval from the [place] Food and Drug Administration for the treatment of obstructive sleep apnea.  This is based on research showing that the system was safe and effective in treating sleep apnea6.  Results showed that the median AHI score decreased 68%, from 29.3 to 9.0. This therapy uses an implant system that senses breathing patterns and delivers mild stimulation to airway muscles, which keeps the airway open during sleep.  The system consists of  three fully implanted components: a small generator (similar in size to a pacemaker), a breathing sensor, and a stimulation lead.  Using a small handheld remote, a patient turns the therapy on before bed and off upon awakening.    Candidates for this device must be greater than 18 years of age, have moderate to severe FRANCINE (AHI between 15-65), BMI less than 35, have tried CPAP/oral appliance for at least 8 weeks without success, and have appropriate upper airway anatomy (determined by a sleep endoscopy performed by Dr. Denis Bailey).    Hypoglossal Nerve Stimulation Pathway:    The sleep surgeon s office will work with the patient through the insurance prior-authorization process (including communications and appeals).    Nasal Procedures:  Nasal obstruction can interfere with nasal breathing during the day and night.  Studies have shown that relief of nasal obstruction can improve the ability of some patients to tolerate positive airway pressure therapy for obstructive sleep apnea1.  Treatment options include medications such as nasal saline, topical corticosteroid and antihistamine sprays, and oral medications such as antihistamines or decongestants. Non-surgical treatments can include external nasal dilators for selected patients. If these are not successful by themselves, surgery can improve the nasal airway either alone or in combination with these other options.      Combination Procedures:  Combination of surgical procedures and other treatments may be recommended, particularly if patients have more than one area of narrowing or persistent positional disease.  The success rate of combination surgery ranges from 66-80%2,3.    References  Gabrielle DEWITT. The Role of the Nose in Snoring and Obstructive Sleep Apnoea: An Update.  Eur Arch Otorhinolaryngol. 2011; 268: 1365-73.   Hiwot SM; Sandra JA; Juliann JR; Pallanch JF; Lexy COATES; Yessy TUCKER; López VARELA. Surgical modifications of the upper airway for obstructive sleep  apnea in adults: a systematic review and meta-analysis. SLEEP 2010;33(10):0759-3369. Kaley CHEATHAM. Hypopharyngeal surgery in obstructive sleep apnea: an evidence-based medicine review.  Arch Otolaryngol Head Neck Surg. 2006 Feb;132(2):206-13.  Navarro YSON, Justen Y, Bharath VALENTIN. The efficacy of anatomically based multilevel surgery for obstructive sleep apnea. Otolaryngol Head Neck Surg. 2003 Oct;129(4):327-35.  Kezirian E, Goldberg A. Hypopharyngeal Surgery in Obstructive Sleep Apnea: An Evidence-Based Medicine Review. Arch Otolaryngol Head Neck Surg. 2006 Feb;132(2):206-13.  Serjio RODRIGUEZ et al. Upper-Airway Stimulation for Obstructive Sleep Apnea.  N Engl J Med. 2014 Jan 9;370(2):139-49.  Srikanth Y et al. Increased Incidence of Cardiovascular Disease in Middle-aged Men with Obstructive Sleep Apnea. Am J Respir Crit Care Med; 2002 166: 159-165  Cerna EM et al. Studying Life Effects and Effectiveness of Palatopharyngoplasty (SLEEP) study: Subjective Outcomes of Isolated Uvulopalatopharyngoplasty. Otolaryngol Head Neck Surg. 2011; 144: 623-631.        WHAT IF I ONLY HAVE SNORING?    Mandibular advancement devices, lateral sleep positioning, long-term weight loss and treatment of nasal allergies have been shown to improve snoring.  Exercising tongue muscles with a game (https://apps.Baileyu.Yozio/us/munir/soundly-reduce-snoring/cw5619335805) or stimulating the tongue during the day with a device (https://doi.org/10.3390/rld91038388) have improved snoring in some individuals.    Remember to Drive Safe... Drive Alive     Sleep health profoundly affects your health, mood, and your safety.  Thirty three percent of the population (one in three of us) is not getting enough sleep and many have a sleep disorder. Not getting enough sleep or having an untreated / undertreated sleep condition may make us sleepy without even knowing it. In fact, our driving could be dramatically impaired due to our sleep health. As your provider, here are some  things I would like you to know about driving:     Here are some warning signs for impairment and dangerous drowsy driving:              -Having been awake more than 16 hours               -Looking tired               -Eyelid drooping              -Head nodding (it could be too late at this point)              -Driving for more than 30 minutes     Some things you could do to make the driving safer if you are experiencing some drowsiness:              -Stop driving and rest              -Call for transportation              -Make sure your sleep disorder is adequately treated     Some things that have been shown NOT to work when experiencing drowsiness while driving:              -Turning on the radio              -Opening windows              -Eating any  distracting  /  entertaining  foods (e.g., sunflower seeds, candy, or any other)              -Talking on the phone      Your decision may not only impact your life, but also the life of others. Please, remember to drive safe for yourself and all of us.  ANXIETY: Insomnia and anxiety can be associated.  Please follow-up with your primary care provider for management of anxiety  [place] [facility]      Treating Insomnia  Good sleeping habits are a key part of treatment. If needed, some medications may help you sleep better at first. Making healthy lifestyle changes and learning to relax can improve your sleep. Treating insomnia takes commitment, but trust that your efforts will pay off. Talk to your doctor before taking any medication.    Healthy Lifestyle  Your lifestyle affects your health and your sleep. Here are some healthy habits:  Keep a regular sleep schedule. Go to bed and get up at the same time each day.  Exercise regularly. It may help you reduce stress. Avoid strenuous exercise for two to four hours before bedtime.  Avoid or limit naps.  Use your bed only for sleep and sex.  Don t spend too much time in bed trying to fall asleep. If you can t  fall asleep, get up and do something until you become tired and drowsy.  Avoid or limit caffeine and nicotine. They can keep you awake at night. Also avoid alcohol. It may help you fall asleep at first, but your sleep will not be restful.    Before Bedtime  To sleep better every night, try these tips:  Have a bedtime routine to let your body and mind know when it s time to sleep.  Going to bed should be relaxing so try to do only relaxing things around bedtime. Sleep will come sooner.  If your worries don t let you sleep, write them down in a diary. Then close it, and go to bed.  Make sure the room is not too hot or too cold. If it s not dark enough, an eye mask can help. If it s noisy, try using earplugs.    Learn to Relax  Stress, anxiety, and body tension may keep you awake at night. To unwind before bedtime, try reading a book, meditation, or yoga. Also, try the following:  Deep breathing. Sit or lie back in a chair. Take a slow, deep breath. Hold it for 5 counts. Then breathe out slowly through your mouth. Keep doing this until you feel relaxed.  Imagery. Think of the last fun trip you took. In your mind, walk through the trip from start to finish. Put as much detail into the memory as you can remember. It will help you relax.    Cognitive Behavioral Treatment (CBT)  CBT is the most effective treatment for long-term insomnia. It tries to address the underlying causes of your sleep problems, including your habits and how you think about sleep.        Online Programs   www.SHUTi.me (pronounced shut eye). There is a fee for this program. Enter the code  Enon  if you decide to enroll in this program.    www.sleepIO.com (pronounced sleep ee oh). There is a fee for this program. Enter the code  Enon  if you decide to enroll in this program.     Suggested Resources  Insomnia Treatment Books   Overcoming Insomnia by Kingsley Joy and Jinny Cota (2008)  No More Sleepless Nights by Rudy Gonzalez and  Valery Muro (1996)  Say Sarah Beth to Insomnia by Ender Mcdonnell (2009)  The Insomnia Workbook by Valerie Rea and Jasvir Oneill (2009)  The Insomnia Answer by Carlos A Contreras and Isaac Mckeon (2006)      Stress Management and Relaxation Books  The Relaxation and Stress Reduction Workbook by Bing Leung, Radha Bran and Gaurav Ryder (2008)  Stress Management Workbook: Techniques and Self-Assessment Procedures by Sobia Martin and Prakash Bain (1997)  A Mindfulness-Based Stress Reduction Workbook by Tung Bowens and Leila Joseph (2010)  The Complete Stress Management Workbook by Elia Gooden, Mani Hoover and Steve Rebollar (1996)  Assert Yourself by Ernestina Walsh and Anthony Walsh (1977)    Relaxation Resources for Computer Download   These websites offer resources to help you relax. This list is for information only. [facility] is not responsible for the quality of services or the actions of any person or organization.  Progressive Muscle Relaxation (PMR):   http://www.[employer]/progressive-muscle-relaxation-exercise.html   http://studentsupport.[facility]/counseling/resources/[email or web address]/relaxation-and-stress-management/   Deep Breathing Exercises:  http://www.[employer]/breathing-awareness.html     Meditation:   www.[employer]  www.thePikhubguided-meditation-site.com You may have to pay for some of these resources.    Guided Imagery:  http://www.[employer]/guided-imagery-scripts.html   http://[employer]/library/bmpvpgxrsb-hbsmkv-rypzgnx/     Counseling / Behavioral Health  [facility]  Visit [email or web address] or call [phone] to find a clinic close to you.      This is not a prescription and these resources are optional. You must pay for any costs when using these resources. Please ask your insurance carrier if you can be reimbursed for these resources. If so, you are responsible for  sending the needed details to your insurance carrier. These resources may also be tax deductible as medical expenses. Check with your .     These programs and publications are not affiliated in any way with [facility].    Recommended following a regular sleep-wake pattern every day, arrived at a goal bedtime of 1130 PM and a goal wake time of 730 AM every day of the week. We discussed minimizing exposure to bright lights at least a couple hours before the goal bedtime.  Please  avoid mind stimulating activities/screen time or use of electronics at least an hour before bed.  Please avoid drinking alcohol within 2 to 3 hours before bed and avoid napping during the day. Recommend taking 1/2 to 1 tablet of over-the-counter melatonin 1 mg strength by mouth at 930 PM. Recommended  using a  bright light box of 10,000 Lux intensity with exposure to bright light for 30 to 60 minutes soon after awakening.   Insomnia - Stimulus Control  When someone lays awake in bed over many nights, your body can actually learn to be awake in bed, mainly because that is what has happened so many times.  Your body has actually been  conditioned  or trained to be awake during the night because it has happened so often.  To break this habit you should try to follow these steps to improve your insomnia:  Set a strict bedtime and rise time to keep every day of the week, including weekends  Go to bed at the set time, but only if you are sleepy (not tired or fatigued but drowsy)  Don t lay in bed for more than 15-30 minutes if you can t sleep.  Get up and go do something relaxing like reading or watching TV until you get drowsy again   Get up at the same time every day regardless of how much sleep you get  Use the bedroom only for sleep and sex.  Do NOT watch TV, read, use the computer, play on your cell phone or do work while in bed    Do not take naps during the daytime and avoid any situations where you might get drowsy or fall asleep  unintentionally especially in the evening.

## 2023-10-11 NOTE — NURSING NOTE
Has patient had flu shot for current/most recent flu season? If so, when? No        Is the patient currently in the state of [place]? YES    Visit mode:VIDEO    If the visit is dropped, the patient can be reconnected by: VIDEO VISIT: Send to e-mail at: [email or web address]    Will anyone else be joining the visit? NO  (If patient encounters technical issues they should call [phone]    How would you like to obtain your AVS? MyChart    Are changes needed to the allergy or medication list? No    Reason for visit: Consult    Nisa FRAIRE

## 2023-10-11 NOTE — PROGRESS NOTES
Virtual Visit Details    Type of service:  Video Visit     Originating Location (pt. Location): Home    Distant Location (provider location):  Off-site  Platform used for Video Visit: [facility]    Outpatient Sleep Medicine Consultation:      Name: Sloane Aguilar MRN# [id number]   Age: 48 year old YOB: 1975     Date of Consultation: October 11, 2023  Consultation is requested by: No referring provider defined for this encounter. No ref. provider found  Primary care provider: No Ref-Primary, Physician       Reason for Sleep Consult:     Sloane Aguilar is sent by No ref. provider found for a sleep consultation regarding insomnia, snoring, nonrestorative sleep and fatigue.    Patient s Reason for visit  Sloane Aguilar main reason for visit: I d like for my sleep to be better  Patient states problem(s) started: 3 years  Sloane Aguilar's goals for this visit: Come up with a solution           Assessment and Plan:   Summary Sleep Diagnoses:  Snoring and observed apneas during sleep  Chronic insomnia-initiation and maintenance  Nonrestorative sleep and fatigue  RLS    Comorbid Diagnoses: Menopausal status, anxiety, past history of cocaine dependence     Summary Recommendations:  # Snoring, observed apneas, non restorative sleep, fatigue in the setting of postmenopausal status  Possible Obstructive sleep apnea  STOP BANG score:4/8 (snoring, tiredness, observed apneas during sleep, age more than 50)  HST/ Polysomnography reviewed.  Patient was interested in HST.  Orders generated in [facility] for for HST to evaluate for sleep disordered breathing.  If home sleep study is negative for sleep disordered breathing, we will consider obtaining in-lab sleep study for further evaluation and she was agreeable with the plan.  Obstructive sleep apnea and consequences of untreated sleep apnea were reviewed.  Information provided regarding treatment options for FRANCINE.    # Chronic Insomnia -multifactorial. 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  She was instructed to avoid drinking alcohol within 2 to 3 hours before bed.  Psychophysiologic insomnia: Information provided regarding resources available through [place] insomnia program and cognitive behavioral therapy for insomnia.    Possible FRANCINE: Will obtain HST  Post Menopausal-hot flashes. Patient reports improvement in hot flashes since initiation of hormone supplementation.  She will continue follow-up with her gynecologist.    Anxiety: We discussed the association of insomnia with anxiety. Recommend follow-up with their primary care provider for optimizing the management of anxiety.  Delayed sleep phase:We arrived at a goal bedtime of 1130 PM and a goal wake up time of 730 AM. Recommended to follow regular sleep schedule, optimizing the duration and circadian timing of sleep and aim at obtaining at least 7 to 8 hours per night and avoid sleep deprivation.  We discussed minimizing exposure to bright light a couple hours before bed and taking one half a tablet of over-the-counter melatonin of 1 mg strength at 9:30 PM.  We also discussed using bright light box of 10,000 Lux intensity and exposure to the bright light soon after awakening in the morning for half hour to 1 hour.    # RLS: Will check fasting serum  ferritin, iron level, percent iron saturation and TIBC and will consider iron supplementation if ferritin is < 75 ng/ml.  Encouraged to try non-pharmacological treatments as well - hot bath/shower, stretching, heating pads, avoiding caffeine/alcohol/chocolate prior to bed.  Patient instructed to let me know if the RLS symptoms were to increase in frequency or severity..     Encouraged to  "follow healthy diet and regular exercise.    Patient was strongly advised to avoid driving, operating any heavy machinery or other hazardous situations while drowsy or sleepy.  Patient was counseled on the importance of driving while alert, to pull over if drowsy, or nap before getting into the vehicle if sleepy.      Plan to communicate results of the home sleep study with the patient via N-1-1hart.    Orders Placed This Encounter   Procedures    HST-Home Sleep Apnea Test - Noxturnal Returnable    Ferritin    Iron and Iron Binding Capacity       Summary Counseling:    Sleep Testing Reviewed  Obstructive Sleep Apnea Reviewed  Complications of Untreated Sleep Apnea Reviewed    Medical Decision-making:   Educational materials provided in instructions      CC: No ref. provider found       The above note was dictated using voice recognition software. Although reviewed after completion, some word and grammatical error may remain . Please contact the author for any clarifications.       \" Total time spent was 70 minutes for this appointment on this date of service which include time spent before, during and after the visit for chart review, patient care, counseling and coordination of care including documentation.\"      Moose Rojas MD  [facility]  [address] [place] [address]            History of Present Illness:     Past Sleep Evaluations: None    SLEEP-WAKE SCHEDULE:     Work/School Days: Patient goes to school/work: Yes   Usually gets into bed at 10:30  Takes patient about 45 minutes to an hour and half to fall asleep  Has trouble falling asleep 5 to 6 nights per week  Wakes up in the middle of the night 3 to 5 times   Wakes up due to Snorting self awake;Use the bathroom;Nightmares  She has trouble falling back asleep 7 times a week.   It usually takes 30 to 45 minutes to get back to sleep  Patient is usually up at 7:30  Uses alarm: Yes    Weekends/Non-work " Days/All Other Days:  Usually gets into bed at 10:30   Takes patient about 45 to fall asleep  Patient is usually up at 7:30  Uses alarm: Yes    She is a night person.  She reports non restorative sleep and fatigue, but denies EDS  Sleep Need  Patient gets  5 hours sleep on average   Patient thinks she needs about 8 hours sleep    Sloane Aguilar prefers to sleep in this position(s): Side   Patient states they do the following activities in bed: She denies reading, watching TV, using phone/computer in bed    Naps  Patient takes a purposeful nap 2 times a week and naps are usually 1 hour in duration  She feels better after a nap: Yes  She dozes off unintentionally 0 days per week  Patient has had a driving accident or near-miss due to sleepiness/drowsiness: No      SLEEP DISRUPTIONS:    Breathing/Snoring  Patient snores:Yes  Other people complain about her snoring: Yes  Patient has been told she stops breathing in her sleep:Yes  She reports gasping occasionally  She has issues with the following: Stuffy nose when you wake up;Getting up to urinate more than once    Movement:  Patient gets pain, discomfort, with an urge to move:  Yes legs feel antsy, mostly the ankles-she is  constantly repositioning and twist her ankles against the bed is soothing  Does not think symptoms affect sleep  It happens when she is resting:  Yes  It happens more at night:  Yes  Patient has been told she kicks her legs at night:  Yes once in while  There is no history of iron deficiency and she denies family history of RLS.     Behaviors in Sleep:  Sloane Aguilar has experienced the following behaviors while sleeping:   Teeth grinding don't use mouth guard as often  There are no reports of sleepwalking, sleep eating, sleep talking or dream enactment behavior.      She has experienced sudden muscle weakness during the day: No    Is there anything else you would like your sleep provider to know: She underwent ablation  in  2006- has had no  menstrual period period since then. She started experiencing terrible hot flashes that started 1-1/2 years ago which sometimes occured every 1/2 hourly and was  interruptive to her nighttime sleep. Her Gynecologist started low dose of estrogen-she uses the hormone supplement intermittently and that relieved the hot flashes, but  her sleep is still not better      CAFFEINE AND OTHER SUBSTANCES:    Patient consumes caffeinated beverages per day:  1 coffee and 1 soda  Last caffeine use is usually: 1 pm  List of any prescribed or over the counter stimulants that patient takes: None  List of any prescribed or over the counter sleep medication patient takes: None at the moment  List of previous sleep medications that patient has tried: Melatonin. Tylenol pm.  Relaxium sleep. CBD gummy s  Patient drinks alcohol to help them sleep: No  Patient drinks alcohol near bedtime: Yes    Family History:  Patient has a family member been diagnosed with a sleep disorder: No          SCALES:    EPWORTH SLEEPINESS SCALE         10/4/2023     9:33 AM    McGrann Sleepiness Scale ( MARIBEL Grier  [id number]<br>ESS - [place]/English - Final version - 21 Nov 07 - [facility] [employer].)   Sitting and reading Slight chance of dozing   Watching TV Slight chance of dozing   Sitting, inactive in a public place (e.g. a theatre or a meeting) Would never doze   As a passenger in a car for an hour without a break Slight chance of dozing   Lying down to rest in the afternoon when circumstances permit Slight chance of dozing   Sitting and talking to someone Would never doze   Sitting quietly after a lunch without alcohol Would never doze   In a car, while stopped for a few minutes in traffic Would never doze   McGrann Score (MC) 4   McGrann Score (Sleep) 4         INSOMNIA SEVERITY INDEX (LAUREN)          10/4/2023     9:15 AM   Insomnia Severity Index (LAUREN)   Difficulty falling asleep 3   Difficulty staying asleep 4   Problems waking up too early 4   How  "SATISFIED/DISSATISFIED are you with your CURRENT sleep pattern? 4   How NOTICEABLE to others do you think your sleep problem is in terms of impairing the quality of your life? 1   How WORRIED/DISTRESSED are you about your current sleep problem? 2   To what extent do you consider your sleep problem to INTERFERE with your daily functioning (e.g. daytime fatigue, mood, ability to function at work/daily chores, concentration, memory, mood, etc.) CURRENTLY? 2   LAUREN Total Score 20       Guidelines for Scoring/Interpretation:  Total score categories:  0-7 = No clinically significant insomnia   8-14 = Subthreshold insomnia   15-21 = Clinical insomnia (moderate severity)  22-28 = Clinical insomnia (severe)  Used via courtesy of www.[place].va.gov with permission from Jasvir Oneill PhD., [facility]      STOP BANG 4/8        10/11/2023    10:43 AM   STOP BANG Questionnaire (  2008, the American Society of Anesthesiologists, Inc. [employer]   BMI Clinic: 27.34         GAD7        7/11/2023     1:28 PM   TAL-7    1. Feeling nervous, anxious, or on edge 0   2. Not being able to stop or control worrying 0   3. Worrying too much about different things 0   4. Trouble relaxing 0   5. Being so restless that it is hard to sit still 0   6. Becoming easily annoyed or irritable 0   7. Feeling afraid, as if something awful might happen 0   TAL-7 Total Score 0   If you checked any problems, how difficult have they made it for you to do your work, take care of things at home, or get along with other people? Not difficult at all         CAGE-AID         No data to display                CAGE-AID reprinted with permission from the [place] Medical Journal, KASSANDRA Llamas. and SARAH George, \"Conjoint screening questionnaires for alcohol and drug abuse\" [place] Medical Journal 94: 135-140, 1995.      PATIENT HEALTH QUESTIONNAIRE-9 (PHQ - 9)        1/4/2021     8:52 AM   PHQ-9 ([employer])   1.  Little interest or " pleasure in doing things 0   2.  Feeling down, depressed, or hopeless 0   3.  Trouble falling or staying asleep, or sleeping too much 0   4.  Feeling tired or having little energy 0   5.  Poor appetite or overeating 0   6.  Feeling bad about yourself - or that you are a failure or have let yourself or your family down 0   7.  Trouble concentrating on things, such as reading the newspaper or watching television 0   8.  Moving or speaking so slowly that other people could have noticed. Or the opposite - being so fidgety or restless that you have been moving around a lot more than usual 0   9.  Thoughts that you would be better off dead, or of hurting yourself in some way 0   PHQ-9 Total Score 0   If you checked off any problems, how difficult have these problems made it for you to do your work, take care of things at home, or get along with other people? Not difficult at all   6.  Feeling bad about yourself 0   7.  Trouble concentrating 0   8.  Moving slowly or restless 0   9.  Suicidal or self-harm thoughts 0   Difficulty at work, home, or with people Not difficult at all       Developed by Wilfred Presley, Caitie Obrien, Deonte Mercer and colleagues, with an educational lashaun from [employer] No permission required to reproduce, translate, display or distribute.        Allergies:    Allergies   Allergen Reactions    Formaldehyde Rash       Medications:    Current Outpatient Medications   Medication Sig Dispense Refill    aluminum chloride (DRYSOL) 20 % external solution Use daily as directed 35 mL prn    clotrimazole-betamethasone (LOTRISONE) 1-0.05 % external cream Apply topically 2 times daily (Patient not taking: Reported on 7/11/2023) 30 g 0    escitalopram (LEXAPRO) 20 MG tablet TAKE 1 TABLET(20 MG) BY MOUTH DAILY 90 tablet 0    estradiol-norethindrone (AMABELZ) 0.5-0.1 MG tablet Take 1 tablet by mouth daily 84 tablet 3    traZODone (DESYREL) 50 MG tablet Take 1-2 tablets ( mg) by mouth  nightly as needed for sleep 180 tablet 1    valACYclovir (VALTREX) 500 MG tablet Take 1 tablet (500 mg) by mouth daily 30 tablet 3       Problem List:  Patient Active Problem List    Diagnosis Date Noted    Dysplasia of cervix, high grade NICK 2 05/09/2018     Priority: Medium     H/O LEEP (2000)  5/9/18 ASCUS, +HR HPV, not 16/18. Plan colp  8/6/18 Henderson Bx: NICK 1 & 2. Plan LEEP  10/22/18 LEEP NICK 1, margins: neg, ECC: neg. Plan: Pap with co-testing 4 months after cervical healing, a colposcopy 8 months after cervical healing and Pap smears at 12, 18 and 24 months after healing of the LEEP incision.  If those are normal I told her that she could go back to annual exams with co-testing  02/18/19 Dx NIL pap, neg HR HPV. Plan colp in 4 months. Pt unable to get Henderson completed. Per provider, cotest this Fall 2019.  10/9/19 NIL, +HR HPV, not 16/18. Plan 6 month co-test due by 4/9/20.    1/4/21 ASCUS, Neg HPV. Plan Henderson bef 4/4/21 2/22/21 Henderson Bx & ECC: NICK 1. Plan 6 month cotest (due 8/22/21)  11/10/21 Lost to follow-up for pap tracking   6/3/22 NIL Pap, Neg HPV. Plan cotest in 1 year due by 6/3/23  7/11/23 NIL pap, neg HPV. Plan cotest in 1 year due by 7/11/24    *After excision or ablation for NICK 2+, patient needs a cotest in 6 months, 18 months, & 30 months, then a cotest every 3 years for at least 25 years. (2019 ASCCP guideline) Any abnormal pap or + HR HPV test within the 25 years warrants a colposcopy.* (2019 ASCCP advisor answer).        Cervical high risk HPV (human papillomavirus) test positive 05/09/2018     Priority: Medium    History of cocaine dependence (H) 11/18/2015     Priority: Medium    Elevated fasting glucose 11/18/2015     Priority: Medium    CARDIOVASCULAR SCREENING; LDL GOAL LESS THAN 160 10/31/2010     Priority: Medium    Anxiety 03/02/2010     Priority: Medium        Past Medical/Surgical History:  Past Medical History:   Diagnosis Date    Abnormal cervical Papanicolaou smear 05/09/2018    see  problem list    anxiety     Cervical high risk HPV (human papillomavirus) test positive 05/09/2018    See problem list    History of colposcopy 08/06/2018 2/22/21     Past Surgical History:   Procedure Laterality Date    COLONOSCOPY N/A 1/23/2023    Procedure: COLONOSCOPY;  Surgeon: Burak Eubanks MD;  Location: [facility]    HC TOOTH EXTRACTION W/FORCEP      HERNIA REPAIR, UMBILICAL  2007    LEEP TX, CERVICAL  2000, 2018    LEEP TX Cervical    TUBAL LIGATION  2007    ablation    ZZC APPENDECTOMY         Social History:  Social History     Socioeconomic History    Marital status:      Spouse name: Not on file    Number of children: Not on file    Years of education: Not on file    Highest education level: Not on file   Occupational History    Occupation:      Comment: results title   Tobacco Use    Smoking status: Never    Smokeless tobacco: Never    Tobacco comments:     Vape- daily   Vaping Use    Vaping Use: Never used   Substance and Sexual Activity    Alcohol use: Yes     Comment: 3-4 times per week    Drug use: No    Sexual activity: Yes     Partners: Male     Birth control/protection: Surgical, Female Surgical     Comment: TL   Other Topics Concern    Parent/sibling w/ CABG, MI or angioplasty before 65F 55M? Yes     Comment: brother stint at 48   Social History Narrative    Not on file     Social Determinants of Health     Financial Resource Strain: Not on file   Food Insecurity: Not on file   Transportation Needs: Not on file   Physical Activity: Not on file   Stress: Not on file   Social Connections: Not on file   Interpersonal Safety: Not on file   Housing Stability: Not on file       Family History:  Family History   Problem Relation Age of Onset    Hypertension Mother     Cancer Mother         uterine dx 2012    Heart Disease Mother     Heart Disease Father     Heart Disease Brother 48        stint       Review of Systems:  A complete review of systems reviewed by me is negative  with the exeption of what has been mentioned in the history of present illness.  In the last TWO WEEKS have you experienced any of the following symptoms?  Fevers: No  Night Sweats: No  Weight Gain: No  Pain at Night: No  Double Vision: No  Changes in Vision: No  Difficulty Breathing through Nose: No  Sore Throat in Morning: No  Dry Mouth in the Morning: No  Shortness of Breath Lying Flat: No  Shortness of Breath With Activity: No  Awakening with Shortness of Breath: No  Increased Cough: No  Heart Racing at Night: No  Swelling in Feet or Legs: No  Diarrhea at Night: No  Heartburn at Night: No  Urinating More than Once at Night: Yes  Losing Control of Urine at Night: No  Joint Pains at Night: Yes  Headaches in Morning: No  Weakness in Arms or Legs: No  Depressed Mood: No  Anxiety: No     Physical Examination:  Vitals: Ht 1.524 m (5')   Wt 63.5 kg (140 lb)   BMI 27.34 kg/m    BMI= Body mass index is 27.34 kg/m .  General: No apparent distress, appropriately groomed  Head: Normocephalic, atraumatic  Neck:Circumference: <16 inches  Chest: No cough, no audible wheezing, able to talk in full sentences  Psych: coherent speech, normal rate and volume, able to articulate logical thoughts, able   to abstract reason, no tangential thoughts, no hallucinations   or delusions Her affect is normal  Neuro:  Mental status: Alert and  Oriented X 3  Speech: normal            Data: All pertinent previous laboratory data reviewed     Recent Labs   Lab Test 07/20/22  0901 01/04/21  0856    138   POTASSIUM 4.0 3.7   CHLORIDE 103 104   CO2 29 28   ANIONGAP 5 6   GLC 98 91   BUN 11 10   CR 0.78 0.71   WILLIAM 9.1 8.6       Recent Labs   Lab Test 07/05/20  1901   WBC 5.5   RBC 4.03   HGB 13.4   HCT 39.3   MCV 98   MCH 33.3*   MCHC 34.1   RDW 12.1          Recent Labs   Lab Test 07/20/22  0901   PROTTOTAL 7.8   ALBUMIN 4.1   BILITOTAL 0.4   ALKPHOS 93   AST 31   ALT 44       TSH (mU/L)   Date Value   10/09/2019 1.36   01/23/2017  "2.94       No results found for: \"UAMP\", \"UBARB\", \"BENZODIAZEUR\", \"UCANN\", \"UCOC\", \"OPIT\", \"UPCP\"    No results found for: \"IRONSAT\", \"KB80822\", \"JEYSON\"    No results found for: \"PH\", \"PHARTERIAL\", \"PO2\", \"[id number]\", \"SAT\", \"PCO2\", \"HCO3\", \"BASEEXCESS\", \"FABIANA\", \"BEB\"    Echocardiography: No results found for this or any previous visit (from the past 4320 hour(s)).    Chest x-ray: No results found for this or any previous visit from the past 365 days.      Chest CT: No results found for this or any previous visit from the past 365 days.      PFT: Most Recent [place]ze Pulmonary Function Testing: No results found        Moose Rojas MD 10/11/2023           "

## 2023-10-20 ASSESSMENT — SLEEP AND FATIGUE QUESTIONNAIRES
HOW LIKELY ARE YOU TO NOD OFF OR FALL ASLEEP WHILE SITTING QUIETLY AFTER LUNCH WITHOUT ALCOHOL: WOULD NEVER DOZE
HOW LIKELY ARE YOU TO NOD OFF OR FALL ASLEEP WHILE LYING DOWN TO REST IN THE AFTERNOON WHEN CIRCUMSTANCES PERMIT: MODERATE CHANCE OF DOZING
HOW LIKELY ARE YOU TO NOD OFF OR FALL ASLEEP WHILE SITTING AND READING: SLIGHT CHANCE OF DOZING
HOW LIKELY ARE YOU TO NOD OFF OR FALL ASLEEP WHILE WATCHING TV: SLIGHT CHANCE OF DOZING
HOW LIKELY ARE YOU TO NOD OFF OR FALL ASLEEP WHILE SITTING AND TALKING TO SOMEONE: WOULD NEVER DOZE
HOW LIKELY ARE YOU TO NOD OFF OR FALL ASLEEP WHEN YOU ARE A PASSENGER IN A CAR FOR AN HOUR WITHOUT A BREAK: MODERATE CHANCE OF DOZING
HOW LIKELY ARE YOU TO NOD OFF OR FALL ASLEEP WHILE SITTING INACTIVE IN A PUBLIC PLACE: WOULD NEVER DOZE
HOW LIKELY ARE YOU TO NOD OFF OR FALL ASLEEP IN A CAR, WHILE STOPPED FOR A FEW MINUTES IN TRAFFIC: WOULD NEVER DOZE

## 2023-10-26 ENCOUNTER — DOCUMENTATION ONLY (OUTPATIENT)
Dept: SLEEP MEDICINE | Facility: CLINIC | Age: 48
End: 2023-10-26

## 2023-10-26 ENCOUNTER — OFFICE VISIT (OUTPATIENT)
Dept: SLEEP MEDICINE | Facility: CLINIC | Age: 48
End: 2023-10-26
Attending: INTERNAL MEDICINE
Payer: COMMERCIAL

## 2023-10-26 DIAGNOSIS — G47.9 SLEEP DISTURBANCE: ICD-10-CM

## 2023-10-26 PROCEDURE — G0399 HOME SLEEP TEST/TYPE 3 PORTA: HCPCS | Performed by: INTERNAL MEDICINE

## 2023-10-26 NOTE — PROGRESS NOTES
Pt is completing a home sleep test. Pt was instructed on how to put on the Noxturnal T3 device and associated equipment before going to bed and given the opportunity to practice putting it on before leaving the sleep center. Pt was reminded to bring the home sleep test kit back to the center tomorrow, at agreed upon time for download and reporting.   Sherri Samuel CMA on 10/26/2023 at 1:27 PM

## 2023-10-27 ENCOUNTER — DOCUMENTATION ONLY (OUTPATIENT)
Dept: SLEEP MEDICINE | Facility: CLINIC | Age: 48
End: 2023-10-27
Payer: COMMERCIAL

## 2023-10-30 NOTE — NURSING NOTE
Pt returned HST device. It was downloaded and forwarded data to the clinical specialist for scoring.  Sherri Samuel CMA on 10/30/2023 at 8:22 AM

## 2023-11-01 NOTE — PROGRESS NOTES
This HSAT was performed using a Noxturnal T3 device which recorded snore, sound, movement activity, body position, nasal pressure, oronasal thermal airflow, pulse, oximetry and both chest and abdominal respiratory effort. HSAT data was restricted to the time patient states they were in bed.     HSAT was scored using 1B 4% hypopnea rule.     HST AHI (Non-PAT): 11.5  Snoring was reported as mild.  Time with SpO2 below 89% was 9 minutes.   Overall signal quality was good.     Pt will follow up with sleep provider to determine appropriate therapy.

## 2023-11-08 NOTE — PROCEDURES
HOME SLEEP STUDY INTERPRETATION        Patient: Sloane Aguilar  MRN: [id number]  YOB: 1975  Study Date: 10/26/2023  PCP/Referring Provider: No Ref-Primary, Physician  Ordering Provider: Moose Rojas MD    Indications for Home Study: Sloane Aguilar is a 48 year old female with symptoms suggestive of obstructive sleep apnea.    Estimated body mass index is 27.34 kg/m  as calculated from the following:    Height as of 10/11/23: 1.524 m (5').    Weight as of 10/11/23: 63.5 kg (140 lb).  Total score - [place]: 6 (10/20/2023  9:10 AM)  STOP-BAN/8      Data: A full night home sleep study was performed recording the standard physiologic parameters including body position, movement, sound, nasal pressure, thermal oral airflow, chest and abdominal movements with respiratory inductance plethysmography, and oxygen saturation by pulse oximetry. Pulse rate was estimated by oximetry recording. This study was considered adequate based on > 4 hours of quality oximetry and respiratory recording. As specified by the AASM Manual for the Scoring of Sleep and Associated events, version 2.3, Rule VIII.D 1B, 4% oxygen desaturation scoring for hypopneas is used as a standard of care on all home sleep apnea testing.    Analysis Time:  411 minutes    Respiration:   Sleep Associated Hypoxemia: sustained hypoxemia was present. Average oxygen saturation was 91.4%.  Time with saturation less than or equal to 88% was 9 minutes. The lowest oxygen saturation was 74%.   Snoring: Snoring was present(mild)  Respiratory events: The home study revealed a presence of 8 obstructive apneas and 15 mixed and central apneas. There were 56 hypopneas resulting in a combined apnea/hypopnea index [AHI] of 11.5 events per hour.  AHI was 25.7 per hour supine, n/a prone, 2 per hour on left side, and 7.5 per hour on right side.   Pattern: Excluding events noted above, respiratory rate and pattern was  Normal.      Position: Percent of time spent: supine - 35.7%, prone - 0%, on left - 44.7%, on right - 19.5%.      Heart Rate: By pulse oximetry, the average pulse rate was normal at 73 bpm. The minimum pulse rate was 58 bpm and the maximum pulse rate was 125 bpm.     Assessment:   Mild obstructive sleep apnea was present, pronounced during supine sleep position with sleep associated hypoxemia.      Recommendations:  Consider auto-CPAP at 5-15 cmH2O or positional therapy in combination with dental appliance through referral to sleep dentistry to treat the FRANCINE /snoring.  Suggest optimizing sleep hygiene and avoiding sleep deprivation.  Weight management.      Diagnosis Code(s): Obstructive Sleep Apnea G47.33, Hypoxemia G47.36, Snoring R06.83    Electronically signed by: Moose Rojas MD, November 8, 2023   Diplomate, American Board of Internal Medicine, Sleep Medicine

## 2023-11-28 ENCOUNTER — DOCUMENTATION ONLY (OUTPATIENT)
Dept: SLEEP MEDICINE | Facility: CLINIC | Age: 48
End: 2023-11-28
Payer: COMMERCIAL

## 2023-11-28 DIAGNOSIS — G47.33 OSA (OBSTRUCTIVE SLEEP APNEA): Primary | ICD-10-CM

## 2023-11-28 NOTE — PROGRESS NOTES
Patient was offered choice of vendor and chose [facility].  Patient Sloane Aguilar was set up at [place] on November 28, 2023. Patient received a Resmed Airsense 11 Pressures were set at  5-15 cm H2O.   Patient s ramp is 4 cm H2O for Auto and FLEX/EPR is EPR, 2.  Patient received a Resmed Mask name: Airfit [id number]  Nasal mask size Standard, small-wide, heated tubing and heated humidifier.  Patient has the following compliance requirements: none  Patient has a follow up recommended with Dr. Rojas.    Tracy L Fahrenkamp

## 2023-12-04 ENCOUNTER — ANCILLARY PROCEDURE (OUTPATIENT)
Dept: MAMMOGRAPHY | Facility: CLINIC | Age: 48
End: 2023-12-04
Attending: OBSTETRICS & GYNECOLOGY
Payer: COMMERCIAL

## 2023-12-04 DIAGNOSIS — Z12.31 ENCOUNTER FOR SCREENING MAMMOGRAM FOR BREAST CANCER: ICD-10-CM

## 2023-12-04 PROCEDURE — 77067 SCR MAMMO BI INCL CAD: CPT | Mod: TC | Performed by: RADIOLOGY

## 2023-12-08 ENCOUNTER — LAB (OUTPATIENT)
Dept: LAB | Facility: CLINIC | Age: 48
End: 2023-12-08
Payer: COMMERCIAL

## 2023-12-08 DIAGNOSIS — E78.00 ELEVATED CHOLESTEROL: ICD-10-CM

## 2023-12-08 DIAGNOSIS — E83.19 OTHER DISORDERS OF IRON METABOLISM: ICD-10-CM

## 2023-12-08 DIAGNOSIS — Z01.419 ENCOUNTER FOR GYNECOLOGICAL EXAMINATION WITHOUT ABNORMAL FINDING: ICD-10-CM

## 2023-12-08 LAB
CHOLEST SERPL-MCNC: 240 MG/DL
FASTING STATUS PATIENT QL REPORTED: ABNORMAL
FERRITIN SERPL-MCNC: 268 NG/ML (ref 6–175)
HDLC SERPL-MCNC: 91 MG/DL
IRON BINDING CAPACITY (ROCHE): 311 UG/DL (ref 240–430)
IRON SATN MFR SERPL: 35 % (ref 15–46)
IRON SERPL-MCNC: 109 UG/DL (ref 37–145)
LDLC SERPL CALC-MCNC: 132 MG/DL
NONHDLC SERPL-MCNC: 149 MG/DL
TRIGL SERPL-MCNC: 85 MG/DL
TSH SERPL DL<=0.005 MIU/L-ACNC: 1.57 UIU/ML (ref 0.3–4.2)

## 2023-12-08 PROCEDURE — 36415 COLL VENOUS BLD VENIPUNCTURE: CPT

## 2023-12-08 PROCEDURE — 82728 ASSAY OF FERRITIN: CPT

## 2023-12-08 PROCEDURE — 83540 ASSAY OF IRON: CPT

## 2023-12-08 PROCEDURE — 83550 IRON BINDING TEST: CPT

## 2023-12-08 PROCEDURE — 80061 LIPID PANEL: CPT

## 2023-12-08 PROCEDURE — 84443 ASSAY THYROID STIM HORMONE: CPT

## 2023-12-19 ENCOUNTER — TELEPHONE (OUTPATIENT)
Dept: OBGYN | Facility: CLINIC | Age: 48
End: 2023-12-19
Payer: COMMERCIAL

## 2023-12-19 NOTE — TELEPHONE ENCOUNTER
I left a voicemail message for my patient to return my call.  Her thyroid results are normal but her lipid panel is elevated.  She has a strong family history of heart disease in both of her parents and her brother and the benefit from the use of a statin.  I discussed having her see primary care.  The patient is seeing Dr. Garcia in the past and could discuss potentially starting a statin with her.  If the patient calls back please relay this recommendation to her.  Thank you for your help

## 2024-03-01 ASSESSMENT — SLEEP AND FATIGUE QUESTIONNAIRES
HOW LIKELY ARE YOU TO NOD OFF OR FALL ASLEEP WHILE WATCHING TV: WOULD NEVER DOZE
HOW LIKELY ARE YOU TO NOD OFF OR FALL ASLEEP WHILE SITTING AND TALKING TO SOMEONE: WOULD NEVER DOZE
HOW LIKELY ARE YOU TO NOD OFF OR FALL ASLEEP IN A CAR, WHILE STOPPED FOR A FEW MINUTES IN TRAFFIC: WOULD NEVER DOZE
HOW LIKELY ARE YOU TO NOD OFF OR FALL ASLEEP WHILE SITTING AND READING: SLIGHT CHANCE OF DOZING
HOW LIKELY ARE YOU TO NOD OFF OR FALL ASLEEP WHILE LYING DOWN TO REST IN THE AFTERNOON WHEN CIRCUMSTANCES PERMIT: MODERATE CHANCE OF DOZING
HOW LIKELY ARE YOU TO NOD OFF OR FALL ASLEEP WHILE SITTING INACTIVE IN A PUBLIC PLACE: WOULD NEVER DOZE
HOW LIKELY ARE YOU TO NOD OFF OR FALL ASLEEP WHILE SITTING QUIETLY AFTER LUNCH WITHOUT ALCOHOL: WOULD NEVER DOZE
HOW LIKELY ARE YOU TO NOD OFF OR FALL ASLEEP WHEN YOU ARE A PASSENGER IN A CAR FOR AN HOUR WITHOUT A BREAK: SLIGHT CHANCE OF DOZING

## 2024-03-04 ENCOUNTER — VIRTUAL VISIT (OUTPATIENT)
Dept: SLEEP MEDICINE | Facility: CLINIC | Age: 49
End: 2024-03-04
Payer: COMMERCIAL

## 2024-03-04 VITALS — WEIGHT: 140 LBS | HEIGHT: 60 IN | BODY MASS INDEX: 27.48 KG/M2

## 2024-03-04 DIAGNOSIS — G47.33 OSA ON CPAP: Primary | ICD-10-CM

## 2024-03-04 PROCEDURE — 99214 OFFICE O/P EST MOD 30 MIN: CPT | Mod: 95 | Performed by: INTERNAL MEDICINE

## 2024-03-04 ASSESSMENT — PAIN SCALES - GENERAL: PAINLEVEL: NO PAIN (0)

## 2024-03-04 NOTE — PROGRESS NOTES
Virtual Visit Details    Type of service:  Video Visit     Originating Location (pt. Location): Home    Distant Location (provider location):  Off-site  Platform used for Video Visit: [facility]  Sleep clinic follow-up visit note    Date:3/4/24     Chief complaint:  Follow-up of FRANCINE, review CPAP compliance measures     Sloane Aguilar is a 48 year old female who presents to sleep clinic for follow-up of previously diagnosed  obstructive sleep apnea that is currently managed with CPAP therapy.    She was set up at [place] on November 28, 2023. Patient received a Resmed Airsense 11 Pressures were set at  5-15 cm H2O.   Patient s ramp is 4 cm H2O for Auto and FLEX/EPR is EPR, 2.  Patient received a Resmed Mask name: Airfit N30i  Nasal mask size Standard, small-wide, heated tubing and heated humidifier.      Previous sleep study report: [facility]  Study date: 10/26/23     Analysis Time:  411 minutes     Respiration:   Sleep Associated Hypoxemia: sustained hypoxemia was present. Average oxygen saturation was 91.4%.  Time with saturation less than or equal to 88% was 9 minutes. The lowest oxygen saturation was 74%.   Snoring: Snoring was present(mild)  Respiratory events: The home study revealed a presence of 8 obstructive apneas and 15 mixed and central apneas. There were 56 hypopneas resulting in a combined apnea/hypopnea index [AHI] of 11.5 events per hour.  AHI was 25.7 per hour supine, n/a prone, 2 per hour on left side, and 7.5 per hour on right side.   Pattern: Excluding events noted above, respiratory rate and pattern was Normal.     Position: Percent of time spent: supine - 35.7%, prone - 0%, on left - 44.7%, on right - 19.5%.     Heart Rate: By pulse oximetry, the average pulse rate was normal at 73 bpm. The minimum pulse rate was 58 bpm and the maximum pulse rate was 125 bpm.      Assessment:   Mild obstructive sleep apnea was present, pronounced during supine sleep position with sleep  associated hypoxemia.    Pressure settings on CPAP ranged between 5 to 15 cmH2O  Patient reports using the CPAP regularly during sleep except due to cold for a few days in January and few more days when she travelled out of town.  She is getting acclimated gradually to the CPAP.  She uses nasal mask.  She likes the mask.  There are no reports of snoring, apneas or awakenings due to gasping with the device use.   She feels she could use more pressure. Patient reports good sleep quality with the device use.   Bedtime time:11 pm. Fall asleep 20-30 minutes. She reports waking up 2-4 times-sometimes due to dreams/uncertain reason and she tends to use the bathroom after she wakes.  She reports that she does not put the CPAP on after waking up for the third time during the night.  Her wake up time:8 am    Patient denies excessive daytime sleepiness. ESS: 4 /24.  There are no reports of  drowsiness while driving.      DOWNLOADABLE COMPLIANCE DATA: (ResMed)  From January 27, 2024 through 2/25/24 reveals that patient to use the device for 21 out of 30 days with an averag use of 5 hours and 15 minutes on the days used. 95th percentile on leak was 8.8 L/min. Residual AHI was 2.2 per hour. Median pressure settings: 6.2; 95th percentile : 9.3 and max pressure: 10.1 cm water      Past medical/surgical history, family history, social history, medications and allergies were reviewed.            Physical Examination:   General: Pleasant. Cooperative. In no apparent distress.  Pulmonary: Able to speak in full sentences easily. No cough or wheeze.   Neurologic: Alert, oriented x3.  Psychiatric: Mood euthymic. Affect congruent with full range and intensity.     ASSESSMENT/PLAN:  Obstructive sleep apnea: Pt reports compliance with CPAP and reports positive benefits with CPAP treatment. Based on compliance measures, FRANCIEN is adequately controlled with CPAP at the current settings.    DME orders were generated for revision of the pressure  "settings on the auto CPAP to range 7 to 15 cm water. She was instructed to let us know if she has any difficulty tolerating the revised settings.  Will request our virtual care coordinators to follow-up with the patient through the sleep therapy management program after the pressure settings are revised.  Recommended to continue using the CPAP regularly during sleep and getting the supplies for the CPAP replaced regularly.   Patient instructed to remember to bring PAP with  her if hospitalized and if anticipating procedure that requires sedation/surgery to be sure to discuss with the provider/surgeon that she has sleep apnea and uses PAP therapy.     We discussed weight management with healthy diet, and exercise.     Patient was strongly advised to avoid driving, operating any heavy machinery or other hazardous situations while drowsy or sleepy.  Patient was counseled on the importance of driving while alert, to pull over if drowsy, or nap before getting into the vehicle if sleepy.      Follow-up with sleep clinic via video visit in 1 year or sooner if any concerns.     The above note was dictated using voice recognition software. Although reviewed after completion, some word and grammatical error may remain . Please contact the author for any clarifications.      \" Total time spent was 30 minutes  for this appointment on this date of service which include time spent before, during and after the visit for chart review, patient care, counseling and coordination of care. Including documentation\"      Moose Rojas MD  [facility]  [address] , [place] [address]   "

## 2024-03-04 NOTE — Clinical Note
Fidencio BLANK and Juan. Can either one of you kindly  follow-up with the patient through the STM program.  I have recommended revision of the pressure settings on her CPAP to range 7-15 cmH2O and I'd like you to check with her how she is doing with the revised pressure settings.   Thank you,  Aliza

## 2024-03-04 NOTE — NURSING NOTE
Pt states no recent 2023 flu shot obtained, most recent is 9/9/2022.    Is the patient currently in the state of [place]? YES    Visit mode:VIDEO    If the visit is dropped, the patient can be reconnected by: VIDEO VISIT: Send to e-mail at: [email or web address]    Will anyone else be joining the visit? NO  (If patient encounters technical issues they should call [phone]    How would you like to obtain your AVS? MyChart    Are changes needed to the allergy or medication list? No    Reason for visit: RECHECK    Pt states no BP available today.    Obey FRAIRE

## 2024-03-05 ENCOUNTER — DOCUMENTATION ONLY (OUTPATIENT)
Dept: SLEEP MEDICINE | Facility: CLINIC | Age: 49
End: 2024-03-05
Payer: COMMERCIAL

## 2024-03-15 ENCOUNTER — DOCUMENTATION ONLY (OUTPATIENT)
Dept: SLEEP MEDICINE | Facility: CLINIC | Age: 49
End: 2024-03-15
Payer: COMMERCIAL

## 2024-06-20 ENCOUNTER — PATIENT OUTREACH (OUTPATIENT)
Dept: OBGYN | Facility: CLINIC | Age: 49
End: 2024-06-20
Payer: COMMERCIAL

## 2024-06-24 DIAGNOSIS — F41.9 ANXIETY: ICD-10-CM

## 2024-06-24 RX ORDER — ESCITALOPRAM OXALATE 20 MG/1
20 TABLET ORAL DAILY
Qty: 90 TABLET | Refills: 0 | OUTPATIENT
Start: 2024-06-24

## 2024-06-24 NOTE — TELEPHONE ENCOUNTER
Spoke with patient. Patient has established with another provider and will ask them to fill prescription.     Loretta Pollock  Lead   [facility]     Adequate

## 2024-08-12 NOTE — TELEPHONE ENCOUNTER
FYI to provider - Patient is lost to pap tracking follow-up. Attempts to contact pt have been made per reminder process and there has been no reply and/or no appt scheduled. Contact hx listed below.     H/O LEEP (2000)  5/9/18 ASCUS, +HR HPV, not 16/18. Plan colp  8/6/18 [place] Bx: NICK 1 & 2. Plan LEEP  10/22/18 LEEP NICK 1, margins: neg, ECC: neg. Plan: Pap with co-testing 4 months after cervical healing, a colposcopy 8 months after cervical healing and Pap smears at 12, 18 and 24 months after healing of the LEEP incision.  If those are normal I told her that she could go back to annual exams with co-testing  02/18/19 Dx NIL pap, neg HR HPV. Plan colp in 4 months. Pt unable to get [place] completed. Per provider, cotest this Fall 2019.  10/9/19 NIL, +HR HPV, not 16/18. Plan 6 month co-test due by 4/9/20.    1/4/21 ASCUS, Neg HPV. Plan [place] bef 4/4/21 2/22/21 [place] Bx & ECC: NICK 1. Plan 6 month cotest (due 8/22/21)  11/10/21 Lost to follow-up for pap tracking   6/3/22 NIL Pap, Neg HPV. Plan cotest in 1 year due by 6/3/23  7/11/23 NIL pap, neg HPV. Plan cotest in 1 year due by 7/11/24 6/20/24 Reminder shey  7/17/24 Reminder call- spoke with pt  8/12/24 Lost to follow up

## 2024-09-20 NOTE — TELEPHONE ENCOUNTER
Colposcopy Post-Procedure Patient Instructions      You may experience any of the following for a couple of days following colposcopy:  Mild cramping  Vaginal bleeding   Vaginal discharge that looks black and clumpy    Please call your healthcare provider if you have any of the following symptoms:  Fever--Temperature greater than 100 degrees  Cramping after 48 hours  Bleeding heavier than a normal period in the first 24-48 hours  If you are bleeding and soaking more than one pad an hour  Or any other worrisome problems.    We recommend that:  You use pads, not tampons for the bleeding.  You may resume sexual activity in 2-3 days, or after bleeding stops.  You may use Tylenol or ibuprofen (Motrin or Advil) for any discomfort.      [facility] - [facility] : [phone]   LVM for pt to call back.     EDILMA Mayberry on 7/10/2020 at 7:14 PM

## 2024-10-13 ENCOUNTER — HEALTH MAINTENANCE LETTER (OUTPATIENT)
Age: 49
End: 2024-10-13

## 2025-01-09 DIAGNOSIS — R61 PERSPIRATION EXCESSIVE: ICD-10-CM

## 2025-01-09 NOTE — TELEPHONE ENCOUNTER
Requested Prescriptions   Pending Prescriptions Disp Refills    aluminum chloride (DRYSOL) 20 % external solution [Pharmacy Med Name: DRYSOL DAB-O-MATIC 35ML] 35 mL prn     Sig: APPLY TOPICALLY TO THE AFFECTED AREA DAILY AS DIRECTED       Miscellaneous Dermatologic Agents Failed - 1/9/2025  8:52 AM        Failed - Recent (12 mo) or future (90 days) visit within the authorizing provider's specialty     The patient must have completed an in-person or virtual visit within the past 12 months or has a future visit scheduled within the next 90 days with the authorizing provider s specialty.  Urgent care and e-visits do not qualify as an office visit for this protocol.          Failed - Medication indicated for associated diagnosis     Medication is associated with one or more of the following diagnoses:    Ichthyosis of skin   Dermatitis   Benign prostatic hyperplasia   Male pattern alopecia   Hirsutism   Alopecia   Eczema   Keratosis   Psoriasis   Xeroderma   Hyperhidrosis   Sialorrhea (drooling)          Passed - Refill request is not for Imiquimod, 5-Fluorouracil, or Finasteride      If Imiquimod, 5-Fluorouracil, or Finasteride, may refill if indicated in progress notes.           Passed - Medication is active on med list        Passed - Patient is 24 mos old or older        Passed - No active pregnancy on record        Passed - No positive pregnancy test in past 12 months           Has been 17 months since last appt. [employer] message sent. Will need appt for refill.     KY Garcia

## 2025-01-13 RX ORDER — ALUMINUM CHLORIDE 20 %
SOLUTION, NON-ORAL TOPICAL
Qty: 35 ML | OUTPATIENT
Start: 2025-01-13

## 2025-02-22 ENCOUNTER — HEALTH MAINTENANCE LETTER (OUTPATIENT)
Age: 50
End: 2025-02-22

## (undated) DEVICE — KIT ENDO TURNOVER/PROCEDURE W/CLEAN A SCOPE LINERS 103888

## (undated) RX ORDER — FENTANYL CITRATE 50 UG/ML
INJECTION, SOLUTION INTRAMUSCULAR; INTRAVENOUS
Status: DISPENSED
Start: 2023-01-23